# Patient Record
Sex: FEMALE | Race: WHITE | NOT HISPANIC OR LATINO | Employment: OTHER | URBAN - METROPOLITAN AREA
[De-identification: names, ages, dates, MRNs, and addresses within clinical notes are randomized per-mention and may not be internally consistent; named-entity substitution may affect disease eponyms.]

---

## 2023-06-20 ENCOUNTER — NURSING HOME VISIT (OUTPATIENT)
Dept: POST ACUTE CARE | Facility: EXTERNAL LOCATION | Age: 83
End: 2023-06-20
Payer: MEDICARE

## 2023-06-20 DIAGNOSIS — M81.0 AGE RELATED OSTEOPOROSIS, UNSPECIFIED PATHOLOGICAL FRACTURE PRESENCE: ICD-10-CM

## 2023-06-20 DIAGNOSIS — D64.9 ANEMIA, UNSPECIFIED TYPE: ICD-10-CM

## 2023-06-20 DIAGNOSIS — N39.0 URINARY TRACT INFECTION WITHOUT HEMATURIA, SITE UNSPECIFIED: ICD-10-CM

## 2023-06-20 DIAGNOSIS — N81.10 VAGINAL PROLAPSE: ICD-10-CM

## 2023-06-20 DIAGNOSIS — I77.3 FIBROMUSCULAR DYSPLASIA OF CAROTID ARTERY (CMS-HCC): ICD-10-CM

## 2023-06-20 DIAGNOSIS — S72.351D CLOSED DISPLACED COMMINUTED FRACTURE OF SHAFT OF RIGHT FEMUR WITH ROUTINE HEALING: Primary | ICD-10-CM

## 2023-06-20 DIAGNOSIS — E61.1 IRON DEFICIENCY: ICD-10-CM

## 2023-06-20 DIAGNOSIS — I87.2 VENOUS INSUFFICIENCY: ICD-10-CM

## 2023-06-20 DIAGNOSIS — R53.81 PHYSICAL DECONDITIONING: ICD-10-CM

## 2023-06-20 PROCEDURE — 99310 SBSQ NF CARE HIGH MDM 45: CPT | Performed by: NURSE PRACTITIONER

## 2023-06-20 NOTE — LETTER
Patient: Deisi Barkley  : 1940    Encounter Date: 2023    Chief Complaint:   SNF F/U  R femur fracture  Physical deconditioning/fall  UTI    HPI:   82 year-old female presenting to the ER on 23 w/ c/o R hip pain s/p fall. Pt. reported that she was walking in her home w/ her walker when she lost her balance and fell onto her R side. She was unable to ambulate s/p fall. She was able to call her neighbor who came to check on her and called 911. She was brought into the ER for evaluation. Work-up in ER showed UTI and R intertrochanteric femur fracture. She was admitted to the hospital for further evaluation and treatment. Hospital course:    R femur fracture 2/ fall-ortho consult, underwent IM nailing on , pain mgmt, PT/OT eval, recommending SNF at discharge, SQ lovenox for DVT ppx --> ASA 81 mg BID x 30 days at discharge, WBAT, F/U with ortho after discharge  Fibromuscular dysplasia of the vertebral artery-ASA 81 mg daily, F/U with vascular after discharge  UTI-IV rocephin  Vaginal prolapse-s/p reduction by Uro-GYN on , as prolapse was interfering with surgery for fracture, pt. to F/U after discharge with Uro-GYN (Dr. Walter)  Post-op anemia/RUDDY/vaginal bleeding-s/p 3 units PRBCs, IV venofer, CBC monitored     Pt. was HDS and discharged to [facility] this afternoon. Today, patient denies dizziness, HA, SOB, cough, chest pain, dysuria, constipation, or appetite changes. She reports mild pain to R leg at rest, states that pain is worse with movement. Staff report no clinical concerns.     ROS:    As above in HPI. Otherwise, all other systems have been reviewed and are negative for complaint.    Medications reviewed and verified in NH chart.     Patient Active Problem List   Diagnosis   • Primary osteoarthritis involving multiple joints   • Subclinical hypothyroidism   • Venous insufficiency   • Fibromuscular dysplasia of carotid artery (CMS/HCC)   • Osteoporosis   • Closed displaced  comminuted fracture of shaft of right femur with routine healing   • UTI (urinary tract infection)   • Physical deconditioning   • Anemia        Past Medical History:   Diagnosis Date   • Fibromuscular dysplasia of carotid artery (CMS/HCC)    • Heart attack (CMS/HCC)    • OAB (overactive bladder)    • Osteoporosis    • PMB (postmenopausal bleeding)    • Skin cancer of face    • Uterine prolapse        Past Surgical History:   Procedure Laterality Date   • CARDIAC CATHETERIZATION Left    • CT HEAD ANGIO W AND WO IV CONTRAST  2023    CT HEAD ANGIO W AND WO IV CONTRAST GEN CT   • CT NECK ANGIO W AND WO IV CONTRAST  2023    CT NECK ANGIO W AND WO IV CONTRAST GEN CT   • MR HEAD ANGIO W AND WO IV CONTRAST  2023    MR HEAD ANGIO W AND WO IV CONTRAST GEN MRI   • MR NECK ANGIO W AND WO IV CONTRAST  2023    MR NECK ANGIO W AND WO IV CONTRAST GEN MRI       Family History   Problem Relation Name Age of Onset   • Rheum arthritis Sister     • Other (trigeminal neuralgia) Son     • Anxiety disorder Son     • Rheum arthritis Mother's Sister         Social History     Tobacco Use   Smoking Status Former   • Types: Cigarettes   • Quit date:    • Years since quittin.5   Smokeless Tobacco Never       Social History     Substance and Sexual Activity   Alcohol Use Never       Social History     Substance and Sexual Activity   Drug Use Never       No Known Allergies     Vital Signs:   102/-29-98.3-96% on RA     Physical Exam:  General: Sitting up in bed in NAD, alert   Head/Face: NCAT, symmetrical  Eyes: PERRLA, no injection, no discharge  ENT: Hearing not impaired, ears without scars or lesions, nasal mucosa and turbinates pink, septum midline, lips pink and moist  Neck: Supple, symmetrical  Respiratory: CTA without adventitious sounds, even and nonlabored without use of accessory muscles, good air exchange  Cardio: RRR without murmur or gallops, normal S1S2, NP edema to RLE, pedal pulses 3+/4  bilaterally  Chest/Breast: Symmetrical  GI: BS x 4, normoactive, non-distended, abd round and soft, no masses or tenderness  : No suprapubic tenderness or distention  MSK: Gait not assessed, joints with full ROM without pain or contractures  Skin: Skin warm and dry, no induration, DSG to R hip intact with shadowing noted at top of DSG   Neurologic: Cranial nerves II through XII intact, superficial touch and pain sensation intact  Psychiatric: Alert to person, place, and time, calm and cooperative     Results/Data:    Latest Reference Range & Units 06/20/23 06:17   GLUCOSE 74 - 99 mg/dL 82   SODIUM 136 - 145 mmol/L 139   POTASSIUM 3.5 - 5.3 mmol/L 3.9   CHLORIDE 98 - 107 mmol/L 108 (H)   Bicarbonate 21 - 32 mmol/L 25   Anion Gap 10 - 20 mmol/L 10   Blood Urea Nitrogen 6 - 23 mg/dL 10   Creatinine 0.50 - 1.05 mg/dL 0.47 (L)   Calcium 8.6 - 10.3 mg/dL 8.2 (L)   WBC 4.4 - 11.3 x10E9/L 5.7   RBC 4.00 - 5.20 x10E12/L 2.98 (L)   HEMOGLOBIN 12.0 - 16.0 g/dL 9.0 (L)   HEMATOCRIT 36.0 - 46.0 % 28.3 (L)   MCV 80 - 100 fL 95   MCHC 32.0 - 36.0 g/dL 31.8 (L)   Platelets 150 - 450 x10E9/L 172   RED CELL DISTRIBUTION WIDTH 11.5 - 14.5 % 18.1 (H)   GFR Female >90 mL/min/1.73m2 >90   (H): Data is abnormally high  (L): Data is abnormally low    Assessment/Plan:  Right femur fracture 2/2 fall-s/p IM nailing on 6/14, WBAT to RLE, Tylenol ES 1000 mg BID, tramadol prn, c/w lovenox x 30 days for DVT ppx, monitor CBC, F/U with ortho as scheduled  Physical deconditioning/fall-c/w PT/OT, safety and fall precautions   UTI-s/p IV rocephin, denies symptoms  Vaginal prolapse-s/p reduction by Uro-Gyn on 6/14, F/U with Dr. Walter after discharge  Anemia-post-operative/iron-deficiency/vaginal bleeding related, s/p 3 units PRBCs, IV venofer x 1, monitor CBC weekly and prn  Fibromuscular dysplasia of the vertebral artery-c/w ASA 81 mg daily, F/U with vascular after discharge  Osteoporosis-WB exercises as tolerated, c/w calcium + vit D  Venous  insufficiency-JOB hose to BLE, elevate legs, monitor neurovascular status    Orders:  NNO    Code Status:   Full Code    Time spent reviewing patient's chart on the unit (PMH, PSH, FH, Social Hx AND progress and/or consult notes, labs, and radiology results): 25 minutes  Time spent interviewing and/or examining the patient: 10 minutes  Time spent writing note on the unit: 5 minutes  Time spent reviewing POC w/ patient, family, and/or staff: 6 minutes  Total visit time: 46 minutes. Greater than 50% of time was spent on counseling and/or coordination of care of the patient. Start time: 3:31 PM, End time: 4:17 PM.            Electronically Signed By: BUBBA Joshi   6/30/23  9:23 PM

## 2023-06-24 ENCOUNTER — NURSING HOME VISIT (OUTPATIENT)
Dept: POST ACUTE CARE | Facility: EXTERNAL LOCATION | Age: 83
End: 2023-06-24
Payer: MEDICARE

## 2023-06-24 DIAGNOSIS — Z91.81 AT RISK FOR FALLS: ICD-10-CM

## 2023-06-24 DIAGNOSIS — N39.0 URINARY TRACT INFECTION WITHOUT HEMATURIA, SITE UNSPECIFIED: ICD-10-CM

## 2023-06-24 DIAGNOSIS — R53.1 ASTHENIA: ICD-10-CM

## 2023-06-24 DIAGNOSIS — R53.81 PHYSICAL DECONDITIONING: ICD-10-CM

## 2023-06-24 DIAGNOSIS — N81.10 VAGINAL PROLAPSE: ICD-10-CM

## 2023-06-24 DIAGNOSIS — S72.351D CLOSED DISPLACED COMMINUTED FRACTURE OF SHAFT OF RIGHT FEMUR WITH ROUTINE HEALING: Primary | ICD-10-CM

## 2023-06-24 PROCEDURE — 99305 1ST NF CARE MODERATE MDM 35: CPT | Performed by: INTERNAL MEDICINE

## 2023-06-24 NOTE — LETTER
Patient: Deisi Barkley  : 1940    Encounter Date: 2023    NAME OF THE PATIENT: Deisi Barkley     YOB: 1940    PLACE OF SERVICE:  [facility].    This is new/initial history and physical.    HPI: Ms. Deisi Barkley is an 82-year-old female with right recent hip fracture.  She was also found to have vaginal prolapse, which has been reduced.  She was found also to have urinary tract infection.  She is currently on antibiotics.  She requires supportive care.    PAST MEDICAL HISTORY:  As per nursing home list.  Vaginal prolapse, UTI, fall risk, weakness, and right femur fracture.    CURRENT MEDICATIONS:  As per nursing home list.  Reviewed.    ALLERGIES:  As per nursing home list.  No known allergies.    SOCIAL HISTORY:  As per nursing home list.  The patient denies use of alcohol or tobacco.    FAMILY HISTORY:  As per nursing home list.  Positive history of hypertension in this patient's family.    REVIEW OF SYSTEMS:  All 12 systems reviewed and pertaining covered in history and physical, the rest are negative.  The patient denies any fevers, chills or chest pain at this time.    PHYSICAL EXAMINATION  GENERAL: This is a well-developed, elderly female, lying in bed, appearing weak and lethargic.  VITAL SIGNS:  As recorded and reviewed from EMR.  Blood pressure 122/70.  Pulse 80.  Respirations 16.  Temperature 98.2.  EYES:  The patient's sclerae white.  The pupils were equal and round.  ENT:  The patient's external ears were normal and the otoscopic examination was negative.  NECK:  Supple.  There were no palpable masses.  Thyroid was not enlarged and there were no carotid bruits.  RESPIRATORY:  The patient had normal inspirations and expirations.  The breath sounds were equal bilaterally and clear to auscultation.  CARDIOVASCULAR:  The patient had S1 normal, split S2 without obvious rubs, clicks, or murmurs.  GASTROINTESTINAL:  There was no  hepatosplenomegaly.  There were no palpable masses and no inguinal nodes.  LYMPHATIC:  The patient had no axillary, groin, or lymphadenopathy.  MUSCULOSKELETAL:  The patient had normal gait.  The joints appeared to be normal without evidence of deformity.  Grossly the muscles had good range of motion and were without effusion.  EXTREMITIES:   There was no evidence of peripheral edema.  The patient's right hip wound is intact with no sign of infection.  NEUROLOGIC:  The patient had normal cranial nerves.  The reflexes, sensory, and motor examination were grossly within normal limits.  PSYCHIATRIC: The patient had normal judgment and insight.  The patient was oriented to person, place, and time, and had no obvious mood defect including depression, anxiety, and/or agitation.    LAB WORK:  Laboratory studies were reviewed.    ASSESSMENT AND PLAN:  Right hip fracture, on pain control.  Weakness, on PT and OT.  Fall risk, on fall precaution.  Recent UTI, on antibiotic.  Recent vaginal prolapse, which has been reduced, continue to monitor.  Medication list reviewed and discussed with the family.  Hospital chart reviewed.      Electronically Signed By: Lucero Lacy MD   8/2/23  3:18 PM

## 2023-06-30 ENCOUNTER — NURSING HOME VISIT (OUTPATIENT)
Dept: POST ACUTE CARE | Facility: EXTERNAL LOCATION | Age: 83
End: 2023-06-30
Payer: MEDICARE

## 2023-06-30 DIAGNOSIS — I87.2 VENOUS INSUFFICIENCY: ICD-10-CM

## 2023-06-30 DIAGNOSIS — M81.0 AGE RELATED OSTEOPOROSIS, UNSPECIFIED PATHOLOGICAL FRACTURE PRESENCE: ICD-10-CM

## 2023-06-30 DIAGNOSIS — E61.1 IRON DEFICIENCY: ICD-10-CM

## 2023-06-30 DIAGNOSIS — D64.9 ANEMIA, UNSPECIFIED TYPE: ICD-10-CM

## 2023-06-30 DIAGNOSIS — R53.81 PHYSICAL DECONDITIONING: ICD-10-CM

## 2023-06-30 DIAGNOSIS — N39.0 URINARY TRACT INFECTION WITHOUT HEMATURIA, SITE UNSPECIFIED: ICD-10-CM

## 2023-06-30 DIAGNOSIS — S72.351D CLOSED DISPLACED COMMINUTED FRACTURE OF SHAFT OF RIGHT FEMUR WITH ROUTINE HEALING: Primary | ICD-10-CM

## 2023-06-30 DIAGNOSIS — I77.3 FIBROMUSCULAR DYSPLASIA OF CAROTID ARTERY (CMS-HCC): ICD-10-CM

## 2023-06-30 DIAGNOSIS — N81.10 VAGINAL PROLAPSE: ICD-10-CM

## 2023-06-30 PROBLEM — M15.9 PRIMARY OSTEOARTHRITIS INVOLVING MULTIPLE JOINTS: Status: ACTIVE | Noted: 2019-04-15

## 2023-06-30 PROBLEM — I25.10 CORONARY ARTERY DISEASE INVOLVING NATIVE CORONARY ARTERY OF NATIVE HEART WITHOUT ANGINA PECTORIS: Status: RESOLVED | Noted: 2019-04-15 | Resolved: 2023-06-30

## 2023-06-30 PROBLEM — M15.0 PRIMARY OSTEOARTHRITIS INVOLVING MULTIPLE JOINTS: Status: ACTIVE | Noted: 2019-04-15

## 2023-06-30 PROBLEM — E03.8 SUBCLINICAL HYPOTHYROIDISM: Status: ACTIVE | Noted: 2019-04-15

## 2023-06-30 PROCEDURE — 99309 SBSQ NF CARE MODERATE MDM 30: CPT | Performed by: NURSE PRACTITIONER

## 2023-06-30 NOTE — LETTER
Patient: Deisi Barkley  : 1940    Encounter Date: 2023    Chief Complaint:   SNF F/U  R femur fracture  Physical deconditioning/fall  UTI    HPI:   82 year-old female presenting to the ER on 23 w/ c/o R hip pain s/p fall. Pt. reported that she was walking in her home w/ her walker when she lost her balance and fell onto her R side. She was unable to ambulate s/p fall. She was able to call her neighbor who came to check on her and called 911. She was brought into the ER for evaluation. Work-up in ER showed UTI and R intertrochanteric femur fracture. She was admitted to the hospital for further evaluation and treatment. Hospital course:    R femur fracture 2/ fall-ortho consult, underwent IM nailing on , pain mgmt, PT/OT eval, recommending SNF at discharge, SQ lovenox for DVT ppx --> ASA 81 mg BID x 30 days at discharge, WBAT, F/U with ortho after discharge  Fibromuscular dysplasia of the vertebral artery-ASA 81 mg daily, F/U with vascular after discharge  UTI-IV rocephin  Vaginal prolapse-s/p reduction by Uro-GYN on , as prolapse was interfering with surgery for fracture, pt. to F/U after discharge with Uro-GYN (Dr. Walter)  Post-op anemia/RUDDY/vaginal bleeding-s/p 3 units PRBCs, IV venofer, CBC monitored     Pt. was HDS and discharged to [facility] on 23. Today, patient denies dizziness, HA, SOB, cough, chest pain, dysuria, constipation, or appetite changes. She reports improvement in R leg pain. Staff report no clinical concerns.     ROS:    As above in HPI. Otherwise, all other systems have been reviewed and are negative for complaint.    Medications reviewed and verified in NH chart.     Patient Active Problem List   Diagnosis   • Primary osteoarthritis involving multiple joints   • Subclinical hypothyroidism   • Venous insufficiency   • Fibromuscular dysplasia of carotid artery (CMS/HCC)   • Osteoporosis   • Closed displaced comminuted fracture of shaft of right femur with  routine healing   • UTI (urinary tract infection)   • Physical deconditioning   • Anemia        Past Medical History:   Diagnosis Date   • Fibromuscular dysplasia of carotid artery (CMS/HCC)    • Heart attack (CMS/HCC)    • OAB (overactive bladder)    • Osteoporosis    • PMB (postmenopausal bleeding)    • Skin cancer of face    • Uterine prolapse        Past Surgical History:   Procedure Laterality Date   • CARDIAC CATHETERIZATION Left    • CT HEAD ANGIO W AND WO IV CONTRAST  2023    CT HEAD ANGIO W AND WO IV CONTRAST GEN CT   • CT NECK ANGIO W AND WO IV CONTRAST  2023    CT NECK ANGIO W AND WO IV CONTRAST GEN CT   • MR HEAD ANGIO W AND WO IV CONTRAST  2023    MR HEAD ANGIO W AND WO IV CONTRAST GEN MRI   • MR NECK ANGIO W AND WO IV CONTRAST  2023    MR NECK ANGIO W AND WO IV CONTRAST GEN MRI       Family History   Problem Relation Name Age of Onset   • Rheum arthritis Sister     • Other (trigeminal neuralgia) Son     • Anxiety disorder Son     • Rheum arthritis Mother's Sister         Social History     Tobacco Use   Smoking Status Former   • Types: Cigarettes   • Quit date:    • Years since quittin.5   Smokeless Tobacco Never       Social History     Substance and Sexual Activity   Alcohol Use Never       Social History     Substance and Sexual Activity   Drug Use Never       No Known Allergies     Vital Signs:   125/85-85-18-98.6-97% on RA     Physical Exam:  General: Sitting up in WC in NAD, alert   Head/Face: NCAT, symmetrical  Eyes: PERRLA, no injection, no discharge  ENT: Hearing not impaired, ears without scars or lesions, nasal mucosa and turbinates pink, septum midline, lips pink and moist  Neck: Supple, symmetrical  Respiratory: CTA without adventitious sounds, even and nonlabored without use of accessory muscles, good air exchange  Cardio: RRR without murmur or gallops, normal S1S2, NP edema to RLE, pedal pulses 3+/4 bilaterally  Chest/Breast: Symmetrical  GI: BS x 4,  normoactive, non-distended, abd round and soft, no masses or tenderness  : No suprapubic tenderness or distention  MSK: Gait not assessed, joints with full ROM without pain or contractures  Skin: Skin warm and dry, no induration, DSG to R hip intact with shadowing noted at top of DSG   Neurologic: Cranial nerves II through XII intact, superficial touch and pain sensation intact  Psychiatric: Alert to person, place, and time, calm and cooperative     Results/Data:   6/28/23: Na+ 136, Lauri 8.4, Hgb 9.4, Hct 30.4  6/21/23: Na+ 135, Hgb 8.9, Hct 27.3     Assessment/Plan:  Right femur fracture 2/2 fall-s/p IM nailing on 6/14, WBAT to RLE, Tylenol ES 1000 mg BID, tramadol prn, c/w lovenox x 30 days for DVT ppx, monitor CBC, F/U with ortho as scheduled  Physical deconditioning/fall-c/w PT/OT, safety and fall precautions   UTI-s/p IV rocephin, denies symptoms  Vaginal prolapse-s/p reduction by Uro-Gyn on 6/14, F/U with Dr. Walter after discharge  Anemia-post-operative/iron-deficiency/vaginal bleeding related, s/p 3 units PRBCs, IV venofer x 1, monitor CBC weekly and prn  Fibromuscular dysplasia of the vertebral artery-c/w ASA 81 mg daily, F/U with vascular after discharge  Osteoporosis-WB exercises as tolerated, c/w calcium + vit D  Venous insufficiency-JOB hose to BLE, elevate legs, monitor neurovascular status    Orders:  NNO    Code Status:   DNR-CCA      Electronically Signed By: ANIBAL Joshi-CNP   7/9/23 10:40 PM

## 2023-07-01 NOTE — PROGRESS NOTES
Chief Complaint:   SNF F/U  R femur fracture  Physical deconditioning/fall  UTI    HPI:   82 year-old female presenting to the ER on 6/13/23 w/ c/o R hip pain s/p fall. Pt. reported that she was walking in her home w/ her walker when she lost her balance and fell onto her R side. She was unable to ambulate s/p fall. She was able to call her neighbor who came to check on her and called 911. She was brought into the ER for evaluation. Work-up in ER showed UTI and R intertrochanteric femur fracture. She was admitted to the hospital for further evaluation and treatment. Hospital course:    R femur fracture 2/2 fall-ortho consult, underwent IM nailing on 6/14, pain mgmt, PT/OT eval, recommending SNF at discharge, SQ lovenox for DVT ppx --> ASA 81 mg BID x 30 days at discharge, WBAT, F/U with ortho after discharge  Fibromuscular dysplasia of the vertebral artery-ASA 81 mg daily, F/U with vascular after discharge  UTI-IV rocephin  Vaginal prolapse-s/p reduction by Uro-GYN on 6/14, as prolapse was interfering with surgery for fracture, pt. to F/U after discharge with Uro-GYN (Dr. Walter)  Post-op anemia/RUDDY/vaginal bleeding-s/p 3 units PRBCs, IV venofer, CBC monitored     Pt. was HDS and discharged to [facility] this afternoon. Today, patient denies dizziness, HA, SOB, cough, chest pain, dysuria, constipation, or appetite changes. She reports mild pain to R leg at rest, states that pain is worse with movement. Staff report no clinical concerns.     ROS:    As above in HPI. Otherwise, all other systems have been reviewed and are negative for complaint.    Medications reviewed and verified in NH chart.     Patient Active Problem List   Diagnosis    Primary osteoarthritis involving multiple joints    Subclinical hypothyroidism    Venous insufficiency    Fibromuscular dysplasia of carotid artery (CMS/HCC)    Osteoporosis    Closed displaced comminuted fracture of shaft of right femur with routine healing    UTI (urinary  tract infection)    Physical deconditioning    Anemia        Past Medical History:   Diagnosis Date    Fibromuscular dysplasia of carotid artery (CMS/HCC)     Heart attack (CMS/HCC)     OAB (overactive bladder)     Osteoporosis     PMB (postmenopausal bleeding)     Skin cancer of face     Uterine prolapse        Past Surgical History:   Procedure Laterality Date    CARDIAC CATHETERIZATION Left     CT HEAD ANGIO W AND WO IV CONTRAST  2023    CT HEAD ANGIO W AND WO IV CONTRAST GEN CT    CT NECK ANGIO W AND WO IV CONTRAST  2023    CT NECK ANGIO W AND WO IV CONTRAST GEN CT    MR HEAD ANGIO W AND WO IV CONTRAST  2023    MR HEAD ANGIO W AND WO IV CONTRAST GEN MRI    MR NECK ANGIO W AND WO IV CONTRAST  2023    MR NECK ANGIO W AND WO IV CONTRAST GEN MRI       Family History   Problem Relation Name Age of Onset    Rheum arthritis Sister      Other (trigeminal neuralgia) Son      Anxiety disorder Son      Rheum arthritis Mother's Sister         Social History     Tobacco Use   Smoking Status Former    Types: Cigarettes    Quit date:     Years since quittin.5   Smokeless Tobacco Never       Social History     Substance and Sexual Activity   Alcohol Use Never       Social History     Substance and Sexual Activity   Drug Use Never       No Known Allergies     Vital Signs:   102/-41-98.3-96% on RA     Physical Exam:  General: Sitting up in bed in NAD, alert   Head/Face: NCAT, symmetrical  Eyes: PERRLA, no injection, no discharge  ENT: Hearing not impaired, ears without scars or lesions, nasal mucosa and turbinates pink, septum midline, lips pink and moist  Neck: Supple, symmetrical  Respiratory: CTA without adventitious sounds, even and nonlabored without use of accessory muscles, good air exchange  Cardio: RRR without murmur or gallops, normal S1S2, NP edema to RLE, pedal pulses 3+/4 bilaterally  Chest/Breast: Symmetrical  GI: BS x 4, normoactive, non-distended, abd round and soft, no  masses or tenderness  : No suprapubic tenderness or distention  MSK: Gait not assessed, joints with full ROM without pain or contractures  Skin: Skin warm and dry, no induration, DSG to R hip intact with shadowing noted at top of DSG   Neurologic: Cranial nerves II through XII intact, superficial touch and pain sensation intact  Psychiatric: Alert to person, place, and time, calm and cooperative     Results/Data:    Latest Reference Range & Units 06/20/23 06:17   GLUCOSE 74 - 99 mg/dL 82   SODIUM 136 - 145 mmol/L 139   POTASSIUM 3.5 - 5.3 mmol/L 3.9   CHLORIDE 98 - 107 mmol/L 108 (H)   Bicarbonate 21 - 32 mmol/L 25   Anion Gap 10 - 20 mmol/L 10   Blood Urea Nitrogen 6 - 23 mg/dL 10   Creatinine 0.50 - 1.05 mg/dL 0.47 (L)   Calcium 8.6 - 10.3 mg/dL 8.2 (L)   WBC 4.4 - 11.3 x10E9/L 5.7   RBC 4.00 - 5.20 x10E12/L 2.98 (L)   HEMOGLOBIN 12.0 - 16.0 g/dL 9.0 (L)   HEMATOCRIT 36.0 - 46.0 % 28.3 (L)   MCV 80 - 100 fL 95   MCHC 32.0 - 36.0 g/dL 31.8 (L)   Platelets 150 - 450 x10E9/L 172   RED CELL DISTRIBUTION WIDTH 11.5 - 14.5 % 18.1 (H)   GFR Female >90 mL/min/1.73m2 >90   (H): Data is abnormally high  (L): Data is abnormally low    Assessment/Plan:  Right femur fracture 2/2 fall-s/p IM nailing on 6/14, WBAT to RLE, Tylenol ES 1000 mg BID, tramadol prn, c/w lovenox x 30 days for DVT ppx, monitor CBC, F/U with ortho as scheduled  Physical deconditioning/fall-c/w PT/OT, safety and fall precautions   UTI-s/p IV rocephin, denies symptoms  Vaginal prolapse-s/p reduction by Uro-Gyn on 6/14, F/U with Dr. Walter after discharge  Anemia-post-operative/iron-deficiency/vaginal bleeding related, s/p 3 units PRBCs, IV venofer x 1, monitor CBC weekly and prn  Fibromuscular dysplasia of the vertebral artery-c/w ASA 81 mg daily, F/U with vascular after discharge  Osteoporosis-WB exercises as tolerated, c/w calcium + vit D  Venous insufficiency-JOB hose to BLE, elevate legs, monitor neurovascular status    Orders:  NNO    Code Status:    Full Code    Time spent reviewing patient's chart on the unit (PMH, PSH, FH, Social Hx AND progress and/or consult notes, labs, and radiology results): 25 minutes  Time spent interviewing and/or examining the patient: 10 minutes  Time spent writing note on the unit: 5 minutes  Time spent reviewing POC w/ patient, family, and/or staff: 6 minutes  Total visit time: 46 minutes. Greater than 50% of time was spent on counseling and/or coordination of care of the patient. Start time: 3:31 PM, End time: 4:17 PM.

## 2023-07-03 ENCOUNTER — NURSING HOME VISIT (OUTPATIENT)
Dept: POST ACUTE CARE | Facility: EXTERNAL LOCATION | Age: 83
End: 2023-07-03
Payer: MEDICARE

## 2023-07-03 DIAGNOSIS — G47.9 SLEEP DISORDER: ICD-10-CM

## 2023-07-03 DIAGNOSIS — Z91.81 AT RISK FOR FALLS: ICD-10-CM

## 2023-07-03 DIAGNOSIS — F41.9 ANXIETY: ICD-10-CM

## 2023-07-03 DIAGNOSIS — R53.1 ASTHENIA: ICD-10-CM

## 2023-07-03 DIAGNOSIS — N81.10 VAGINAL PROLAPSE: ICD-10-CM

## 2023-07-03 DIAGNOSIS — Z87.81 S/P RIGHT HIP FRACTURE: Primary | ICD-10-CM

## 2023-07-03 DIAGNOSIS — N39.0 URINARY TRACT INFECTION WITHOUT HEMATURIA, SITE UNSPECIFIED: ICD-10-CM

## 2023-07-03 PROCEDURE — 99307 SBSQ NF CARE SF MDM 10: CPT | Performed by: INTERNAL MEDICINE

## 2023-07-03 NOTE — LETTER
Patient: Deisi Barkley  : 1940    Encounter Date: 2023    NAME OF THE PATIENT: Deisi Barkley    YOB: 1940    PLACE OF SERVICE:  [facility]    This is a subsequent visit.    HPI: Ms. Deisi Barkley is an 82-year-old female with history of right femur fracture with vaginal prolapse.  She has undergone surgical repair.  She has been placed into physical and occupational therapy in order to gain strength and function.    PAST MEDICAL HISTORY:  As per nursing home list.  Right hip fracture, vaginal prolapse, weakness, frequent falls, UTI, adjustment disorder and insomnia.    CURRENT MEDICATIONS:  As per nursing home list.  Reviewed.    ALLERGIES:  As per nursing home list.  No known allergies.    SOCIAL HISTORY:  As per nursing home list. The patient denies use of alcohol or tobacco.    FAMILY HISTORY:  As per nursing home list.  Positive history of hypertension in this patient's family.    REVIEW OF SYSTEMS:  All 12 systems reviewed and pertaining covered in history and physical, the rest are negative.  The patient denies any fevers, chills or chest pain at this time.    PHYSICAL EXAMINATION  GENERAL: This is a well-developed, well-nourished female, lying in bed, appearing weak and lethargic.  VITAL SIGNS:  As recorded and reviewed from EMR.  Blood pressure 120/70.  Pulse 82.  Respirations 16.  Temperature 98.3.  EYES:  The patient's sclerae white.  The pupils were equal and round.  ENT:  The patient's external ears were normal and the otoscopic examination was negative.  NECK:  Supple.  There were no palpable masses.  Thyroid was not enlarged and there were no carotid bruits.  RESPIRATORY:  The patient had normal inspirations and expirations.  The breath sounds were equal bilaterally and clear to auscultation.  CARDIOVASCULAR:  The patient had S1 normal, split S2 without obvious rubs, clicks, or murmurs.  GASTROINTESTINAL:  There was no  hepatosplenomegaly.  There were no palpable masses and no inguinal nodes.  GENITOURINARY:  Examination shows no prolapse at this time.  LYMPHATIC:  The patient had no axillary, groin, or lymphadenopathy.  MUSCULOSKELETAL:  The patient had normal gait.  The joints appeared to be normal without evidence of deformity.  Grossly the muscles had good range of motion and were without effusion.  EXTREMITIES:  The patient's right hip wound is intact with no sign of infection.  NEUROLOGIC:  The patient had normal cranial nerves.  The reflexes, sensory, and motor examination were grossly within normal limits.  PSYCHIATRIC: The patient had normal judgment and insight. The patient was oriented to person, place, and time, and had no obvious mood defect including depression, anxiety and/or agitation.    LAB WORK: Laboratory studies were reviewed.    ASSESSMENT AND PLAN:  Right hip fracture, on pain control.  History of vaginal prolapse, continue to monitor.  Weakness, on PT/OT.  Fall risk, on fall precaution.  Recent UTI, continue to monitor.  Anxiety, on medication.  Sleep disorder, on melatonin.  Medication list reviewed and discussed with the family.  Hospital chart reviewed.       Electronically Signed By: Lucero Lacy MD   8/2/23  3:18 PM

## 2023-07-07 ENCOUNTER — NURSING HOME VISIT (OUTPATIENT)
Dept: POST ACUTE CARE | Facility: EXTERNAL LOCATION | Age: 83
End: 2023-07-07
Payer: MEDICARE

## 2023-07-07 DIAGNOSIS — I87.2 VENOUS INSUFFICIENCY: ICD-10-CM

## 2023-07-07 DIAGNOSIS — I77.3 FIBROMUSCULAR DYSPLASIA OF CAROTID ARTERY (CMS-HCC): ICD-10-CM

## 2023-07-07 DIAGNOSIS — N39.0 URINARY TRACT INFECTION WITHOUT HEMATURIA, SITE UNSPECIFIED: ICD-10-CM

## 2023-07-07 DIAGNOSIS — S72.351D CLOSED DISPLACED COMMINUTED FRACTURE OF SHAFT OF RIGHT FEMUR WITH ROUTINE HEALING: Primary | ICD-10-CM

## 2023-07-07 DIAGNOSIS — E61.1 IRON DEFICIENCY: ICD-10-CM

## 2023-07-07 DIAGNOSIS — R53.81 PHYSICAL DECONDITIONING: ICD-10-CM

## 2023-07-07 DIAGNOSIS — N81.10 VAGINAL PROLAPSE: ICD-10-CM

## 2023-07-07 DIAGNOSIS — D64.9 ANEMIA, UNSPECIFIED TYPE: ICD-10-CM

## 2023-07-07 PROCEDURE — 99309 SBSQ NF CARE MODERATE MDM 30: CPT | Performed by: NURSE PRACTITIONER

## 2023-07-07 NOTE — LETTER
Patient: Deisi Barkley  : 1940    Encounter Date: 2023    Chief Complaint:   SNF F/U  R femur fracture  Physical deconditioning/fall  UTI    HPI:   82 year-old female presenting to the ER on 23 w/ c/o R hip pain s/p fall. Pt. reported that she was walking in her home w/ her walker when she lost her balance and fell onto her R side. She was unable to ambulate s/p fall. She was able to call her neighbor who came to check on her and called 911. She was brought into the ER for evaluation. Work-up in ER showed UTI and R intertrochanteric femur fracture. She was admitted to the hospital for further evaluation and treatment. Hospital course:    R femur fracture 2/ fall-ortho consult, underwent IM nailing on , pain mgmt, PT/OT eval, recommending SNF at discharge, SQ lovenox for DVT ppx --> ASA 81 mg BID x 30 days at discharge, WBAT, F/U with ortho after discharge  Fibromuscular dysplasia of the vertebral artery-ASA 81 mg daily, F/U with vascular after discharge  UTI-IV rocephin  Vaginal prolapse-s/p reduction by Uro-GYN on , as prolapse was interfering with surgery for fracture, pt. to F/U after discharge with Uro-GYN (Dr. Walter)  Post-op anemia/RUDDY/vaginal bleeding-s/p 3 units PRBCs, IV venofer, CBC monitored     Pt. was HDS and discharged to [facility] on 23. Today, patient denies dizziness, HA, SOB, cough, chest pain, dysuria, constipation, or appetite changes. She continues to report improvement in R leg pain. She reports improvement in R knee swelling. Staff report no clinical concerns.     ROS:    As above in HPI. Otherwise, all other systems have been reviewed and are negative for complaint.    Medications reviewed and verified in NH chart.     Patient Active Problem List   Diagnosis   • Primary osteoarthritis involving multiple joints   • Subclinical hypothyroidism   • Venous insufficiency   • Fibromuscular dysplasia of carotid artery (CMS/HCC)   • Osteoporosis   • Closed  displaced comminuted fracture of shaft of right femur with routine healing   • UTI (urinary tract infection)   • Physical deconditioning   • Anemia        Past Medical History:   Diagnosis Date   • Fibromuscular dysplasia of carotid artery (CMS/HCC)    • Heart attack (CMS/HCC)    • OAB (overactive bladder)    • Osteoporosis    • PMB (postmenopausal bleeding)    • Skin cancer of face    • Uterine prolapse        Past Surgical History:   Procedure Laterality Date   • CARDIAC CATHETERIZATION Left    • CT HEAD ANGIO W AND WO IV CONTRAST  2023    CT HEAD ANGIO W AND WO IV CONTRAST GEN CT   • CT NECK ANGIO W AND WO IV CONTRAST  2023    CT NECK ANGIO W AND WO IV CONTRAST GEN CT   • MR HEAD ANGIO W AND WO IV CONTRAST  2023    MR HEAD ANGIO W AND WO IV CONTRAST GEN MRI   • MR NECK ANGIO W AND WO IV CONTRAST  2023    MR NECK ANGIO W AND WO IV CONTRAST GEN MRI       Family History   Problem Relation Name Age of Onset   • Rheum arthritis Sister     • Other (trigeminal neuralgia) Son     • Anxiety disorder Son     • Rheum arthritis Mother's Sister         Social History     Tobacco Use   Smoking Status Former   • Types: Cigarettes   • Quit date:    • Years since quittin.5   Smokeless Tobacco Never       Social History     Substance and Sexual Activity   Alcohol Use Never       Social History     Substance and Sexual Activity   Drug Use Never       No Known Allergies     Vital Signs:   123/76-84-18-97.3-98% on RA     Physical Exam:  General: Sitting up in WC in NAD, alert   Head/Face: NCAT, symmetrical  Eyes: PERRLA, no injection, no discharge  ENT: Hearing not impaired, ears without scars or lesions, nasal mucosa and turbinates pink, septum midline, lips pink and moist  Neck: Supple, symmetrical  Respiratory: CTA without adventitious sounds, even and nonlabored without use of accessory muscles, good air exchange  Cardio: RRR without murmur or gallops, normal S1S2, trace pedal edema, pedal  pulses 3+/4 bilaterally  Chest/Breast: Symmetrical  GI: BS x 4, normoactive, non-distended, abd round and soft, no masses or tenderness  : No suprapubic tenderness or distention  MSK: Gait not assessed, joints with full ROM without pain or contractures  Skin: Skin warm and dry, no induration, surgical incision to R hip/R femur well-approximated and ANGEL, no s/s of infection  Neurologic: Cranial nerves II through XII intact, superficial touch and pain sensation intact  Psychiatric: Alert to person, place, and time, calm and cooperative     Results/Data:   7/5/23: WBC 3.79, Hgb 10.1, Hct 31.5  6/28/23: Na+ 136, Lauri 8.4, Hgb 9.4, Hct 30.4  6/21/23: Na+ 135, Hgb 8.9, Hct 27.3     Assessment/Plan:  Right femur fracture 2/2 fall-s/p IM nailing on 6/14, WBAT to RLE, Tylenol ES 1000 mg BID, tramadol prn, c/w lovenox x 30 days for DVT ppx, monitor CBC, F/U with ortho as scheduled  Physical deconditioning/fall-c/w PT/OT, safety and fall precautions   UTI-s/p IV rocephin, denies symptoms  Vaginal prolapse-s/p reduction by Uro-Gyn on 6/14, F/U with Dr. Walter after discharge  Anemia-post-operative/iron-deficiency/vaginal bleeding related, s/p 3 units PRBCs, IV venofer x 1, monitor CBC weekly and prn  Fibromuscular dysplasia of the vertebral artery-c/w ASA 81 mg daily, F/U with vascular after discharge  Osteoporosis-WB exercises as tolerated, c/w calcium + vit D  Venous insufficiency-JOB hose to BLE, elevate legs, monitor neurovascular status    Orders:  NNO    Code Status:   DNR-CCA      Electronically Signed By: ANIBAL Joshi-CNP   7/14/23 12:24 PM

## 2023-07-09 ENCOUNTER — NURSING HOME VISIT (OUTPATIENT)
Dept: POST ACUTE CARE | Facility: EXTERNAL LOCATION | Age: 83
End: 2023-07-09
Payer: MEDICARE

## 2023-07-09 DIAGNOSIS — Z91.81 AT RISK FOR FALLS: ICD-10-CM

## 2023-07-09 DIAGNOSIS — N39.0 URINARY TRACT INFECTION WITHOUT HEMATURIA, SITE UNSPECIFIED: ICD-10-CM

## 2023-07-09 DIAGNOSIS — Z87.81 S/P RIGHT HIP FRACTURE: Primary | ICD-10-CM

## 2023-07-09 DIAGNOSIS — G47.09 OTHER INSOMNIA: ICD-10-CM

## 2023-07-09 DIAGNOSIS — R53.1 ASTHENIA: ICD-10-CM

## 2023-07-09 PROCEDURE — 99307 SBSQ NF CARE SF MDM 10: CPT | Performed by: INTERNAL MEDICINE

## 2023-07-09 NOTE — LETTER
Patient: Deisi Barkley  : 1940    Encounter Date: 2023    NAME OF THE PATIENT: Deisi Barkley    YOB: 1940    PLACE OF SERVICE:  [facility]    This is a subsequent visit.    HPI:  Ms. Deisi Barkley is an 82-year-old female with history of right femur fracture.  She has undergone surgical repair.  She attends physical and occupational therapy in order to gain strength and function.    PAST MEDICAL HISTORY:  As per nursing home list.  Vaginal prolapse, UTI, fall risk, weakness, and right femur fracture.    CURRENT MEDICATIONS:  As per nursing home list.  Reviewed.    ALLERGIES:  As per nursing home list.  No known allergies.    SOCIAL HISTORY:  As per nursing home list.  The patient denies use of alcohol or tobacco.    FAMILY HISTORY:  As per nursing home list.  Positive history of hypertension in this patient's family.    REVIEW OF SYSTEMS:  All 12 systems reviewed and pertaining covered in history and physical, the rest are negative.  The patient denies any fevers, chills or chest pain at this time.    PHYSICAL EXAMINATION  GENERAL:  This is a well-developed, well-nourished female, lying in bed, appearing weak and lethargic.  VITAL SIGNS:  As recorded and reviewed from EMR.  Blood pressure 118/70.  Pulse 74.  Respirations 16.  Temperature 97.9.  EYES:  The patient's sclerae white.  The pupils were equal and round.  ENT:  The patient's external ears were normal and the otoscopic examination was negative.  NECK:  Supple.  There were no palpable masses.  Thyroid was not enlarged and there were no carotid bruits.  RESPIRATORY:  The patient had normal inspirations and expirations.  The breath sounds were equal bilaterally and clear to auscultation.  CARDIOVASCULAR:  The patient had S1 normal, split S2 without obvious rubs, clicks, or murmurs.  GASTROINTESTINAL:  There was no hepatosplenomegaly.  There were no palpable masses and no inguinal  nodes.  LYMPHATIC:  The patient had no axillary, groin, or lymphadenopathy.  MUSCULOSKELETAL:  The patient had normal gait.  The joints appeared to be normal without evidence of deformity.  Grossly the muscles had good range of motion and were without effusion.  EXTREMITIES:  The patient's right hip wound is intact with no sign of infection.  NEUROLOGIC:  The patient had normal cranial nerves.  The reflexes, sensory, and motor examination were grossly within normal limits.  PSYCHIATRIC: The patient had normal judgment and insight.  The patient was oriented to person, place, and time, and had no obvious mood defect including depression, anxiety, and/or agitation.    LAB WORK:  Laboratory studies were reviewed from prior visit.    ASSESSMENT AND PLAN:  Right hip fracture, on pain control.  Recent UTI.  Continue to monitor.  Weakness, on PT/OT.  Fall risk, on fall precautions.  Insomnia, on melatonin.  Medication list reviewed and discussed with the family.  Hospital chart reviewed.      Electronically Signed By: Lucero Lacy MD   10/5/23 11:31 AM

## 2023-07-10 NOTE — PROGRESS NOTES
Chief Complaint:   SNF F/U  R femur fracture  Physical deconditioning/fall  UTI    HPI:   82 year-old female presenting to the ER on 6/13/23 w/ c/o R hip pain s/p fall. Pt. reported that she was walking in her home w/ her walker when she lost her balance and fell onto her R side. She was unable to ambulate s/p fall. She was able to call her neighbor who came to check on her and called 911. She was brought into the ER for evaluation. Work-up in ER showed UTI and R intertrochanteric femur fracture. She was admitted to the hospital for further evaluation and treatment. Hospital course:    R femur fracture 2/2 fall-ortho consult, underwent IM nailing on 6/14, pain mgmt, PT/OT eval, recommending SNF at discharge, SQ lovenox for DVT ppx --> ASA 81 mg BID x 30 days at discharge, WBAT, F/U with ortho after discharge  Fibromuscular dysplasia of the vertebral artery-ASA 81 mg daily, F/U with vascular after discharge  UTI-IV rocephin  Vaginal prolapse-s/p reduction by Uro-GYN on 6/14, as prolapse was interfering with surgery for fracture, pt. to F/U after discharge with Uro-GYN (Dr. Walter)  Post-op anemia/RUDDY/vaginal bleeding-s/p 3 units PRBCs, IV venofer, CBC monitored     Pt. was HDS and discharged to [facility] on 6/20/23. Today, patient denies dizziness, HA, SOB, cough, chest pain, dysuria, constipation, or appetite changes. She reports improvement in R leg pain. Staff report no clinical concerns.     ROS:    As above in HPI. Otherwise, all other systems have been reviewed and are negative for complaint.    Medications reviewed and verified in NH chart.     Patient Active Problem List   Diagnosis    Primary osteoarthritis involving multiple joints    Subclinical hypothyroidism    Venous insufficiency    Fibromuscular dysplasia of carotid artery (CMS/HCC)    Osteoporosis    Closed displaced comminuted fracture of shaft of right femur with routine healing    UTI (urinary tract infection)    Physical deconditioning     Anemia        Past Medical History:   Diagnosis Date    Fibromuscular dysplasia of carotid artery (CMS/HCC)     Heart attack (CMS/HCC)     OAB (overactive bladder)     Osteoporosis     PMB (postmenopausal bleeding)     Skin cancer of face     Uterine prolapse        Past Surgical History:   Procedure Laterality Date    CARDIAC CATHETERIZATION Left     CT HEAD ANGIO W AND WO IV CONTRAST  2023    CT HEAD ANGIO W AND WO IV CONTRAST GEN CT    CT NECK ANGIO W AND WO IV CONTRAST  2023    CT NECK ANGIO W AND WO IV CONTRAST GEN CT    MR HEAD ANGIO W AND WO IV CONTRAST  2023    MR HEAD ANGIO W AND WO IV CONTRAST GEN MRI    MR NECK ANGIO W AND WO IV CONTRAST  2023    MR NECK ANGIO W AND WO IV CONTRAST GEN MRI       Family History   Problem Relation Name Age of Onset    Rheum arthritis Sister      Other (trigeminal neuralgia) Son      Anxiety disorder Son      Rheum arthritis Mother's Sister         Social History     Tobacco Use   Smoking Status Former    Types: Cigarettes    Quit date:     Years since quittin.5   Smokeless Tobacco Never       Social History     Substance and Sexual Activity   Alcohol Use Never       Social History     Substance and Sexual Activity   Drug Use Never       No Known Allergies     Vital Signs:   125/85-85-18-98.6-97% on RA     Physical Exam:  General: Sitting up in WC in NAD, alert   Head/Face: NCAT, symmetrical  Eyes: PERRLA, no injection, no discharge  ENT: Hearing not impaired, ears without scars or lesions, nasal mucosa and turbinates pink, septum midline, lips pink and moist  Neck: Supple, symmetrical  Respiratory: CTA without adventitious sounds, even and nonlabored without use of accessory muscles, good air exchange  Cardio: RRR without murmur or gallops, normal S1S2, NP edema to RLE, pedal pulses 3+/4 bilaterally  Chest/Breast: Symmetrical  GI: BS x 4, normoactive, non-distended, abd round and soft, no masses or tenderness  : No suprapubic tenderness  or distention  MSK: Gait not assessed, joints with full ROM without pain or contractures  Skin: Skin warm and dry, no induration, DSG to R hip intact with shadowing noted at top of DSG   Neurologic: Cranial nerves II through XII intact, superficial touch and pain sensation intact  Psychiatric: Alert to person, place, and time, calm and cooperative     Results/Data:   6/28/23: Na+ 136, Lauri 8.4, Hgb 9.4, Hct 30.4  6/21/23: Na+ 135, Hgb 8.9, Hct 27.3     Assessment/Plan:  Right femur fracture 2/2 fall-s/p IM nailing on 6/14, WBAT to RLE, Tylenol ES 1000 mg BID, tramadol prn, c/w lovenox x 30 days for DVT ppx, monitor CBC, F/U with ortho as scheduled  Physical deconditioning/fall-c/w PT/OT, safety and fall precautions   UTI-s/p IV rocephin, denies symptoms  Vaginal prolapse-s/p reduction by Uro-Gyn on 6/14, F/U with Dr. Walter after discharge  Anemia-post-operative/iron-deficiency/vaginal bleeding related, s/p 3 units PRBCs, IV venofer x 1, monitor CBC weekly and prn  Fibromuscular dysplasia of the vertebral artery-c/w ASA 81 mg daily, F/U with vascular after discharge  Osteoporosis-WB exercises as tolerated, c/w calcium + vit D  Venous insufficiency-JOB hose to BLE, elevate legs, monitor neurovascular status    Orders:  NNO    Code Status:   DNR-CCA

## 2023-07-11 ENCOUNTER — NURSING HOME VISIT (OUTPATIENT)
Dept: POST ACUTE CARE | Facility: EXTERNAL LOCATION | Age: 83
End: 2023-07-11
Payer: MEDICARE

## 2023-07-11 DIAGNOSIS — M81.0 AGE RELATED OSTEOPOROSIS, UNSPECIFIED PATHOLOGICAL FRACTURE PRESENCE: ICD-10-CM

## 2023-07-11 DIAGNOSIS — N81.10 VAGINAL PROLAPSE: ICD-10-CM

## 2023-07-11 DIAGNOSIS — T81.49XA SURGICAL WOUND INFECTION: ICD-10-CM

## 2023-07-11 DIAGNOSIS — I77.3 FIBROMUSCULAR DYSPLASIA OF CAROTID ARTERY (CMS-HCC): ICD-10-CM

## 2023-07-11 DIAGNOSIS — D64.9 ANEMIA, UNSPECIFIED TYPE: ICD-10-CM

## 2023-07-11 DIAGNOSIS — R53.81 PHYSICAL DECONDITIONING: ICD-10-CM

## 2023-07-11 DIAGNOSIS — S72.351D CLOSED DISPLACED COMMINUTED FRACTURE OF SHAFT OF RIGHT FEMUR WITH ROUTINE HEALING: Primary | ICD-10-CM

## 2023-07-11 DIAGNOSIS — I87.2 VENOUS INSUFFICIENCY: ICD-10-CM

## 2023-07-11 DIAGNOSIS — N39.0 URINARY TRACT INFECTION WITHOUT HEMATURIA, SITE UNSPECIFIED: ICD-10-CM

## 2023-07-11 PROCEDURE — 99309 SBSQ NF CARE MODERATE MDM 30: CPT | Performed by: NURSE PRACTITIONER

## 2023-07-11 NOTE — LETTER
Patient: Deisi Barkley  : 1940    Encounter Date: 2023    Chief Complaint:   SNF F/U  R femur fracture  Physical deconditioning/fall  UTI    HPI:   82 year-old female presenting to the ER on 23 w/ c/o R hip pain s/p fall. Pt. reported that she was walking in her home w/ her walker when she lost her balance and fell onto her R side. She was unable to ambulate s/p fall. She was able to call her neighbor who came to check on her and called 911. She was brought into the ER for evaluation. Work-up in ER showed UTI and R intertrochanteric femur fracture. She was admitted to the hospital for further evaluation and treatment. Hospital course:    R femur fracture 2/ fall-ortho consult, underwent IM nailing on , pain mgmt, PT/OT eval, recommending SNF at discharge, SQ lovenox for DVT ppx --> ASA 81 mg BID x 30 days at discharge, WBAT, F/U with ortho after discharge  Fibromuscular dysplasia of the vertebral artery-ASA 81 mg daily, F/U with vascular after discharge  UTI-IV rocephin  Vaginal prolapse-s/p reduction by Uro-GYN on , as prolapse was interfering with surgery for fracture, pt. to F/U after discharge with Uro-GYN (Dr. Walter)  Post-op anemia/RUDDY/vaginal bleeding-s/p 3 units PRBCs, IV venofer, CBC monitored     Pt. was HDS and discharged to [facility] on 23. Today, patient denies dizziness, HA, SOB, cough, chest pain, dysuria, constipation, or appetite changes. Pt. is s/p Augmentin for R hip surgical incision infection. She reports improvement in swelling, pain, and warmth. She continues to report R knee pain, XR showed arthritis. Staff continue to report patient with increased vaginal prolapse. Pt. denies any vaginal bleeding, pain, or burning with urination. No other clinical concerns noted at this time.     ROS:    As above in HPI. Otherwise, all other systems have been reviewed and are negative for complaint.    Medications reviewed and verified in NH chart.     Patient Active  Problem List   Diagnosis   • Primary osteoarthritis involving multiple joints   • Subclinical hypothyroidism   • Venous insufficiency   • Fibromuscular dysplasia of carotid artery (CMS/HCC)   • Osteoporosis   • Closed displaced comminuted fracture of shaft of right femur with routine healing   • UTI (urinary tract infection)   • Physical deconditioning   • Anemia        Past Medical History:   Diagnosis Date   • Fibromuscular dysplasia of carotid artery (CMS/HCC)    • Heart attack (CMS/HCC)    • OAB (overactive bladder)    • Osteoporosis    • PMB (postmenopausal bleeding)    • Skin cancer of face    • Uterine prolapse        Past Surgical History:   Procedure Laterality Date   • CARDIAC CATHETERIZATION Left    • CT HEAD ANGIO W AND WO IV CONTRAST  2023    CT HEAD ANGIO W AND WO IV CONTRAST GEN CT   • CT NECK ANGIO W AND WO IV CONTRAST  2023    CT NECK ANGIO W AND WO IV CONTRAST GEN CT   • MR HEAD ANGIO W AND WO IV CONTRAST  2023    MR HEAD ANGIO W AND WO IV CONTRAST GEN MRI   • MR NECK ANGIO W AND WO IV CONTRAST  2023    MR NECK ANGIO W AND WO IV CONTRAST GEN MRI       Family History   Problem Relation Name Age of Onset   • Rheum arthritis Sister     • Other (trigeminal neuralgia) Son     • Anxiety disorder Son     • Rheum arthritis Mother's Sister         Social History     Tobacco Use   Smoking Status Former   • Types: Cigarettes   • Quit date:    • Years since quittin.5   Smokeless Tobacco Never       Social History     Substance and Sexual Activity   Alcohol Use Never       Social History     Substance and Sexual Activity   Drug Use Never       No Known Allergies     Vital Signs:   126/72-85-16-97.6-98% on RA     Physical Exam:  General: Sitting up in WC in NAD, alert   Head/Face: NCAT, symmetrical  Eyes: PERRLA, no injection, no discharge  ENT: Hearing not impaired, ears without scars or lesions, nasal mucosa and turbinates pink, septum midline, lips pink and moist  Neck:  Supple, symmetrical  Respiratory: CTA without adventitious sounds, even and nonlabored without use of accessory muscles, good air exchange  Cardio: RRR without murmur or gallops, normal S1S2, trace pedal edema, pedal pulses 3+/4 bilaterally  Chest/Breast: Symmetrical  GI: BS x 4, normoactive, non-distended, abd round and soft, no masses or tenderness  : No suprapubic tenderness or distention, + vaginal prolapse  MSK: Gait not assessed, joints with full ROM without pain or contractures  Skin: Skin warm and dry, no induration, surgical incision to R hip/R femur well-approximated and ANGEL, mild swelling noted, non-tender with palpation, no redness noted  Neurologic: Cranial nerves II through XII intact, superficial touch and pain sensation intact  Psychiatric: Alert to person, place, and time, calm and cooperative     Results/Data:   7/5/23: WBC 3.79, Hgb 10.1, Hct 31.5  6/28/23: Na+ 136, Lauri 8.4, Hgb 9.4, Hct 30.4  6/21/23: Na+ 135, Hgb 8.9, Hct 27.3     Assessment/Plan:  Right femur fracture 2/2 fall-s/p IM nailing on 6/14, WBAT to RLE, Tylenol ES 1000 mg BID, tramadol prn, c/w lovenox x 30 days for DVT ppx, monitor CBC, F/U with ortho as scheduled  Physical deconditioning/fall-c/w PT/OT, safety and fall precautions   UTI-s/p IV rocephin, denies symptoms  Vaginal prolapse-s/p reduction by Uro-Gyn on 6/14, F/U with Dr. Walter  Anemia-post-operative/iron-deficiency/vaginal bleeding related, s/p 3 units PRBCs, IV venofer x 1, monitor CBC weekly and prn  Fibromuscular dysplasia of the vertebral artery-c/w ASA 81 mg daily, F/U with vascular after discharge  Osteoporosis-WB exercises as tolerated, c/w calcium + vit D  Venous insufficiency-JOB hose to BLE, elevate legs, monitor neurovascular status  R knee pain-XR showed arthritis, c/w Tylenol ES, start on Aspercreme BID, encourage ROM, F/U with ortho as scheduled  R hip surgical wound infection-c/w local wound are, s/p augmentin, improvement noted    Orders:  Aspercreme  to R knee BID for pain  F/U w/ Dr. Walter for vaginal prolapse    Code Status:   DNR-CCA      Electronically Signed By: BUBBA Joshi   7/19/23  9:59 AM

## 2023-07-14 NOTE — PROGRESS NOTES
Chief Complaint:   SNF F/U  R femur fracture  Physical deconditioning/fall  UTI    HPI:   82 year-old female presenting to the ER on 6/13/23 w/ c/o R hip pain s/p fall. Pt. reported that she was walking in her home w/ her walker when she lost her balance and fell onto her R side. She was unable to ambulate s/p fall. She was able to call her neighbor who came to check on her and called 911. She was brought into the ER for evaluation. Work-up in ER showed UTI and R intertrochanteric femur fracture. She was admitted to the hospital for further evaluation and treatment. Hospital course:    R femur fracture 2/2 fall-ortho consult, underwent IM nailing on 6/14, pain mgmt, PT/OT eval, recommending SNF at discharge, SQ lovenox for DVT ppx --> ASA 81 mg BID x 30 days at discharge, WBAT, F/U with ortho after discharge  Fibromuscular dysplasia of the vertebral artery-ASA 81 mg daily, F/U with vascular after discharge  UTI-IV rocephin  Vaginal prolapse-s/p reduction by Uro-GYN on 6/14, as prolapse was interfering with surgery for fracture, pt. to F/U after discharge with Uro-GYN (Dr. Walter)  Post-op anemia/RUDDY/vaginal bleeding-s/p 3 units PRBCs, IV venofer, CBC monitored     Pt. was HDS and discharged to [facility] on 6/20/23. Today, patient denies dizziness, HA, SOB, cough, chest pain, dysuria, constipation, or appetite changes. She continues to report improvement in R leg pain. She reports improvement in R knee swelling. Staff report no clinical concerns.     ROS:    As above in HPI. Otherwise, all other systems have been reviewed and are negative for complaint.    Medications reviewed and verified in NH chart.     Patient Active Problem List   Diagnosis    Primary osteoarthritis involving multiple joints    Subclinical hypothyroidism    Venous insufficiency    Fibromuscular dysplasia of carotid artery (CMS/HCC)    Osteoporosis    Closed displaced comminuted fracture of shaft of right femur with routine healing    UTI  (urinary tract infection)    Physical deconditioning    Anemia        Past Medical History:   Diagnosis Date    Fibromuscular dysplasia of carotid artery (CMS/HCC)     Heart attack (CMS/HCC)     OAB (overactive bladder)     Osteoporosis     PMB (postmenopausal bleeding)     Skin cancer of face     Uterine prolapse        Past Surgical History:   Procedure Laterality Date    CARDIAC CATHETERIZATION Left     CT HEAD ANGIO W AND WO IV CONTRAST  2023    CT HEAD ANGIO W AND WO IV CONTRAST GEN CT    CT NECK ANGIO W AND WO IV CONTRAST  2023    CT NECK ANGIO W AND WO IV CONTRAST GEN CT    MR HEAD ANGIO W AND WO IV CONTRAST  2023    MR HEAD ANGIO W AND WO IV CONTRAST GEN MRI    MR NECK ANGIO W AND WO IV CONTRAST  2023    MR NECK ANGIO W AND WO IV CONTRAST GEN MRI       Family History   Problem Relation Name Age of Onset    Rheum arthritis Sister      Other (trigeminal neuralgia) Son      Anxiety disorder Son      Rheum arthritis Mother's Sister         Social History     Tobacco Use   Smoking Status Former    Types: Cigarettes    Quit date:     Years since quittin.5   Smokeless Tobacco Never       Social History     Substance and Sexual Activity   Alcohol Use Never       Social History     Substance and Sexual Activity   Drug Use Never       No Known Allergies     Vital Signs:   123/76-84-18-97.3-98% on RA     Physical Exam:  General: Sitting up in WC in NAD, alert   Head/Face: NCAT, symmetrical  Eyes: PERRLA, no injection, no discharge  ENT: Hearing not impaired, ears without scars or lesions, nasal mucosa and turbinates pink, septum midline, lips pink and moist  Neck: Supple, symmetrical  Respiratory: CTA without adventitious sounds, even and nonlabored without use of accessory muscles, good air exchange  Cardio: RRR without murmur or gallops, normal S1S2, trace pedal edema, pedal pulses 3+/4 bilaterally  Chest/Breast: Symmetrical  GI: BS x 4, normoactive, non-distended, abd round and  soft, no masses or tenderness  : No suprapubic tenderness or distention  MSK: Gait not assessed, joints with full ROM without pain or contractures  Skin: Skin warm and dry, no induration, surgical incision to R hip/R femur well-approximated and ANGEL, no s/s of infection  Neurologic: Cranial nerves II through XII intact, superficial touch and pain sensation intact  Psychiatric: Alert to person, place, and time, calm and cooperative     Results/Data:   7/5/23: WBC 3.79, Hgb 10.1, Hct 31.5  6/28/23: Na+ 136, Lauri 8.4, Hgb 9.4, Hct 30.4  6/21/23: Na+ 135, Hgb 8.9, Hct 27.3     Assessment/Plan:  Right femur fracture 2/2 fall-s/p IM nailing on 6/14, WBAT to RLE, Tylenol ES 1000 mg BID, tramadol prn, c/w lovenox x 30 days for DVT ppx, monitor CBC, F/U with ortho as scheduled  Physical deconditioning/fall-c/w PT/OT, safety and fall precautions   UTI-s/p IV rocephin, denies symptoms  Vaginal prolapse-s/p reduction by Uro-Gyn on 6/14, F/U with Dr. Walter after discharge  Anemia-post-operative/iron-deficiency/vaginal bleeding related, s/p 3 units PRBCs, IV venofer x 1, monitor CBC weekly and prn  Fibromuscular dysplasia of the vertebral artery-c/w ASA 81 mg daily, F/U with vascular after discharge  Osteoporosis-WB exercises as tolerated, c/w calcium + vit D  Venous insufficiency-JOB hose to BLE, elevate legs, monitor neurovascular status    Orders:  NNO    Code Status:   DNR-CCA

## 2023-07-16 ENCOUNTER — NURSING HOME VISIT (OUTPATIENT)
Dept: POST ACUTE CARE | Facility: EXTERNAL LOCATION | Age: 83
End: 2023-07-16
Payer: MEDICARE

## 2023-07-16 DIAGNOSIS — F43.20 ADJUSTMENT DISORDER, UNSPECIFIED TYPE: ICD-10-CM

## 2023-07-16 DIAGNOSIS — Z91.81 AT RISK FOR FALLS: ICD-10-CM

## 2023-07-16 DIAGNOSIS — G47.09 OTHER INSOMNIA: ICD-10-CM

## 2023-07-16 DIAGNOSIS — R53.1 ASTHENIA: Primary | ICD-10-CM

## 2023-07-16 DIAGNOSIS — Z87.81 S/P RIGHT HIP FRACTURE: ICD-10-CM

## 2023-07-16 DIAGNOSIS — N39.0 URINARY TRACT INFECTION WITHOUT HEMATURIA, SITE UNSPECIFIED: ICD-10-CM

## 2023-07-16 PROCEDURE — 99307 SBSQ NF CARE SF MDM 10: CPT | Performed by: INTERNAL MEDICINE

## 2023-07-16 NOTE — LETTER
Patient: Deisi Barkley  : 1940    Encounter Date: 2023    NAME OF THE PATIENT: Deisi Barkley     YOB: 1940    PLACE OF SERVICE: [facility]    This is a subsequent visit.    HPI: Ms. Deisi Barkley is an 82-year-old female with history of right femur fracture.  She has undergone surgical repair.  She continues to be a fall risk and requires supportive care.    PAST MEDICAL HISTORY:  As per nursing home list.  Right hip fracture, vaginal prolapse, weakness, frequent falls, UTI, adjustment disorder and insomnia.    CURRENT MEDICATIONS:  As per nursing home list.  Reviewed.    ALLERGIES:  As per nursing home list.  No known allergies.    SOCIAL HISTORY:  As per nursing home list. The patient denies use of alcohol or tobacco.    FAMILY HISTORY:  As per nursing home list.  Positive history of hypertension in this patient's family.    REVIEW OF SYSTEMS:  All 12 systems reviewed and pertaining covered in history and physical, the rest are negative.  The patient denies any fevers, chills or chest pain at this time.    PHYSICAL EXAMINATION  GENERAL: This is a well-developed, well-nourished female, sitting in chair, in no acute distress.  VITAL SIGNS:  As recorded and reviewed from EMR.  Blood pressure 118/72.  Pulse 76.  Respirations 16.  Temperature 97.4.  EYES:  The patient's sclerae white.  The pupils were equal and round.  ENT:  The patient's external ears were normal and the otoscopic examination was negative.  NECK:  Supple.  There were no palpable masses.  Thyroid was not enlarged and there were no carotid bruits.  RESPIRATORY:  The patient had normal inspirations and expirations.  The breath sounds were equal bilaterally and clear to auscultation.  CARDIOVASCULAR:  The patient had S1 normal, split S2 without obvious rubs, clicks, or murmurs.  GASTROINTESTINAL:  There was no hepatosplenomegaly.  There were no palpable masses and no inguinal  nodes.  LYMPHATIC:  The patient had no axillary, groin, or lymphadenopathy.  MUSCULOSKELETAL:  The patient had normal gait.  The joints appeared to be normal without evidence of deformity.  Grossly the muscles had good range of motion and were without effusion.  EXTREMITIES: The patient's right leg wound is intact with no sign of infection.  NEUROLOGIC:  The patient had normal cranial nerves.  The reflexes, sensory, and motor examination were grossly within normal limits.  PSYCHIATRIC: The patient had normal judgment and insight.  The patient was oriented to person, place, and time, and had no obvious mood defect including depression, anxiety, and/or agitation.    LAB WORK: Laboratory studies reviewed from prior visit.    ASSESSMENT AND PLAN:  Right femur fracture, on pain control.  Weakness, on PT/OT.  Fall risk, fall precaution.  Recent UTI, continue to monitor.  Insomnia, on melatonin.  Adjustment disorder, continue to monitor.  Medication list reviewed and discussed with the family.  Hospital chart reviewed.      Electronically Signed By: Lucero Lacy MD   10/5/23 11:31 AM

## 2023-07-18 ENCOUNTER — NURSING HOME VISIT (OUTPATIENT)
Dept: POST ACUTE CARE | Facility: EXTERNAL LOCATION | Age: 83
End: 2023-07-18
Payer: MEDICARE

## 2023-07-18 DIAGNOSIS — N93.9 VAGINAL BLEEDING: ICD-10-CM

## 2023-07-18 DIAGNOSIS — M81.0 AGE RELATED OSTEOPOROSIS, UNSPECIFIED PATHOLOGICAL FRACTURE PRESENCE: ICD-10-CM

## 2023-07-18 DIAGNOSIS — E61.1 IRON DEFICIENCY: ICD-10-CM

## 2023-07-18 DIAGNOSIS — N81.10 VAGINAL PROLAPSE: Primary | ICD-10-CM

## 2023-07-18 DIAGNOSIS — I77.3 FIBROMUSCULAR DYSPLASIA OF CAROTID ARTERY (CMS-HCC): ICD-10-CM

## 2023-07-18 DIAGNOSIS — S72.351D CLOSED DISPLACED COMMINUTED FRACTURE OF SHAFT OF RIGHT FEMUR WITH ROUTINE HEALING: ICD-10-CM

## 2023-07-18 DIAGNOSIS — N39.0 URINARY TRACT INFECTION WITHOUT HEMATURIA, SITE UNSPECIFIED: ICD-10-CM

## 2023-07-18 DIAGNOSIS — T81.49XA SURGICAL WOUND INFECTION: ICD-10-CM

## 2023-07-18 DIAGNOSIS — R53.81 PHYSICAL DECONDITIONING: ICD-10-CM

## 2023-07-18 PROCEDURE — 99309 SBSQ NF CARE MODERATE MDM 30: CPT | Performed by: NURSE PRACTITIONER

## 2023-07-18 NOTE — LETTER
Patient: Deisi Barkley  : 1940    Encounter Date: 2023    Chief Complaint:   SNF F/U  R femur fracture  Physical deconditioning/fall  UTI  Vaginal prolapse     HPI:   82 year-old female presenting to the ER on 23 w/ c/o R hip pain s/p fall. Pt. reported that she was walking in her home w/ her walker when she lost her balance and fell onto her R side. She was unable to ambulate s/p fall. She was able to call her neighbor who came to check on her and called 911. She was brought into the ER for evaluation. Work-up in ER showed UTI and R intertrochanteric femur fracture. She was admitted to the hospital for further evaluation and treatment. Hospital course:    R femur fracture 2/ fall-ortho consult, underwent IM nailing on , pain mgmt, PT/OT eval, recommending SNF at discharge, SQ lovenox for DVT ppx --> ASA 81 mg BID x 30 days at discharge, WBAT, F/U with ortho after discharge  Fibromuscular dysplasia of the vertebral artery-ASA 81 mg daily, F/U with vascular after discharge  UTI-IV rocephin  Vaginal prolapse-s/p reduction by Uro-GYN on , as prolapse was interfering with surgery for fracture, pt. to F/U after discharge with Uro-GYN (Dr. Walter)  Post-op anemia/RUDDY/vaginal bleeding-s/p 3 units PRBCs, IV venofer, CBC monitored     Pt. was HDS and discharged to [facility] on 23. Today, patient denies dizziness, HA, SOB, cough, chest pain, dysuria, constipation, or appetite changes. Pt. is s/p Augmentin for R hip surgical incision infection. She reports improvement in swelling and pain of R hip. She continues to report R knee pain, XR showed arthritis. Staff continue to report patient with increased vaginal prolapse and vaginal bleeding. Pt. denies any pain or burning with urination. Pt. was referred to Dr. Walter for F/U, but the soonest appointment is in October. No other clinical concerns noted at this time.     ROS:    As above in HPI. Otherwise, all other systems have been reviewed  and are negative for complaint.    Medications reviewed and verified in NH chart.     Patient Active Problem List   Diagnosis   • Primary osteoarthritis involving multiple joints   • Subclinical hypothyroidism   • Venous insufficiency   • Fibromuscular dysplasia of carotid artery (CMS/HCC)   • Osteoporosis   • Closed displaced comminuted fracture of shaft of right femur with routine healing   • UTI (urinary tract infection)   • Physical deconditioning   • Anemia        Past Medical History:   Diagnosis Date   • Fibromuscular dysplasia of carotid artery (CMS/HCC)    • Heart attack (CMS/HCC)    • OAB (overactive bladder)    • Osteoporosis    • PMB (postmenopausal bleeding)    • Skin cancer of face    • Uterine prolapse        Past Surgical History:   Procedure Laterality Date   • CARDIAC CATHETERIZATION Left    • CT HEAD ANGIO W AND WO IV CONTRAST  2023    CT HEAD ANGIO W AND WO IV CONTRAST GEN CT   • CT NECK ANGIO W AND WO IV CONTRAST  2023    CT NECK ANGIO W AND WO IV CONTRAST GEN CT   • MR HEAD ANGIO W AND WO IV CONTRAST  2023    MR HEAD ANGIO W AND WO IV CONTRAST GEN MRI   • MR NECK ANGIO W AND WO IV CONTRAST  2023    MR NECK ANGIO W AND WO IV CONTRAST GEN MRI       Family History   Problem Relation Name Age of Onset   • Rheum arthritis Sister     • Other (trigeminal neuralgia) Son     • Anxiety disorder Son     • Rheum arthritis Mother's Sister         Social History     Tobacco Use   Smoking Status Former   • Types: Cigarettes   • Quit date:    • Years since quittin.6   Smokeless Tobacco Never       Social History     Substance and Sexual Activity   Alcohol Use Never       Social History     Substance and Sexual Activity   Drug Use Never       No Known Allergies     Vital Signs:   114/66-76-18-98.5-97% on RA     Physical Exam:  General: Sitting up in WC in NAD, alert   Head/Face: NCAT, symmetrical  Eyes: PERRLA, no injection, no discharge  ENT: Hearing not impaired, ears  without scars or lesions, nasal mucosa and turbinates pink, septum midline, lips pink and moist  Neck: Supple, symmetrical  Respiratory: CTA without adventitious sounds, even and nonlabored without use of accessory muscles, good air exchange  Cardio: RRR without murmur or gallops, normal S1S2, trace pedal edema, pedal pulses 3+/4 bilaterally  Chest/Breast: Symmetrical  GI: BS x 4, normoactive, non-distended, abd round and soft, no masses or tenderness  : No suprapubic tenderness or distention, + vaginal prolapse, + vaginal bleeding   MSK: Gait not assessed, joints with full ROM without pain or contractures  Skin: Skin warm and dry, no induration, surgical incision to R hip/R femur well-approximated and ANGEL, mild swelling noted, non-tender with palpation, no redness noted  Neurologic: Cranial nerves II through XII intact, superficial touch and pain sensation intact  Psychiatric: Alert to person, place, and time, calm and cooperative     Results/Data:   7/12/23: Hgb 10.7, Hct 34.5  7/5/23: WBC 3.79, Hgb 10.1, Hct 31.5  6/28/23: Na+ 136, Lauri 8.4, Hgb 9.4, Hct 30.4  6/21/23: Na+ 135, Hgb 8.9, Hct 27.3     Assessment/Plan:  Right femur fracture 2/2 fall-s/p IM nailing on 6/14, WBAT to RLE, Tylenol ES 1000 mg BID, tramadol prn, completed 30-days of lovenox for DVT ppx, monitor CBC, F/U with ortho as scheduled  Physical deconditioning/fall-c/w PT/OT, safety and fall precautions   UTI-s/p IV rocephin, denies symptoms  Vaginal prolapse-s/p reduction by Uro-Gyn on 6/14, F/U with Dr. Walter (soonest available appointment is in October), prolapse is worsening with increased vaginal bleeding, will send to [facility] ER for evaluation   Anemia-post-operative/iron-deficiency/vaginal bleeding related, s/p 3 units PRBCs, IV venofer x 1, monitor CBC weekly and prn  Fibromuscular dysplasia of the vertebral artery-c/w ASA 81 mg daily, F/U with vascular after discharge  Osteoporosis-WB exercises as tolerated, c/w calcium + vit  D  Venous insufficiency-JOB hose to BLE, elevate legs, monitor neurovascular status  R knee pain-XR showed arthritis, c/w Tylenol ES and Aspercreme BID, encourage ROM, F/U with ortho as scheduled  R hip surgical wound infection-c/w local wound are, s/p augmentin, improvement noted    Orders:  Send to [facility] ER for evaluation of vaginal prolapse     Code Status:   DNR-CCA      Electronically Signed By: ANIBAL Joshi-CNP   7/27/23  9:45 AM

## 2023-07-19 NOTE — PROGRESS NOTES
Chief Complaint:   SNF F/U  R femur fracture  Physical deconditioning/fall  UTI    HPI:   82 year-old female presenting to the ER on 6/13/23 w/ c/o R hip pain s/p fall. Pt. reported that she was walking in her home w/ her walker when she lost her balance and fell onto her R side. She was unable to ambulate s/p fall. She was able to call her neighbor who came to check on her and called 911. She was brought into the ER for evaluation. Work-up in ER showed UTI and R intertrochanteric femur fracture. She was admitted to the hospital for further evaluation and treatment. Hospital course:    R femur fracture 2/2 fall-ortho consult, underwent IM nailing on 6/14, pain mgmt, PT/OT eval, recommending SNF at discharge, SQ lovenox for DVT ppx --> ASA 81 mg BID x 30 days at discharge, WBAT, F/U with ortho after discharge  Fibromuscular dysplasia of the vertebral artery-ASA 81 mg daily, F/U with vascular after discharge  UTI-IV rocephin  Vaginal prolapse-s/p reduction by Uro-GYN on 6/14, as prolapse was interfering with surgery for fracture, pt. to F/U after discharge with Uro-GYN (Dr. Walter)  Post-op anemia/RUDDY/vaginal bleeding-s/p 3 units PRBCs, IV venofer, CBC monitored     Pt. was HDS and discharged to [facility] on 6/20/23. Today, patient denies dizziness, HA, SOB, cough, chest pain, dysuria, constipation, or appetite changes. Pt. is s/p Augmentin for R hip surgical incision infection. She reports improvement in swelling, pain, and warmth. She continues to report R knee pain, XR showed arthritis. Staff continue to report patient with increased vaginal prolapse. Pt. denies any vaginal bleeding, pain, or burning with urination. No other clinical concerns noted at this time.     ROS:    As above in HPI. Otherwise, all other systems have been reviewed and are negative for complaint.    Medications reviewed and verified in NH chart.     Patient Active Problem List   Diagnosis    Primary osteoarthritis involving multiple  joints    Subclinical hypothyroidism    Venous insufficiency    Fibromuscular dysplasia of carotid artery (CMS/HCC)    Osteoporosis    Closed displaced comminuted fracture of shaft of right femur with routine healing    UTI (urinary tract infection)    Physical deconditioning    Anemia        Past Medical History:   Diagnosis Date    Fibromuscular dysplasia of carotid artery (CMS/HCC)     Heart attack (CMS/HCC)     OAB (overactive bladder)     Osteoporosis     PMB (postmenopausal bleeding)     Skin cancer of face     Uterine prolapse        Past Surgical History:   Procedure Laterality Date    CARDIAC CATHETERIZATION Left     CT HEAD ANGIO W AND WO IV CONTRAST  2023    CT HEAD ANGIO W AND WO IV CONTRAST GEN CT    CT NECK ANGIO W AND WO IV CONTRAST  2023    CT NECK ANGIO W AND WO IV CONTRAST GEN CT    MR HEAD ANGIO W AND WO IV CONTRAST  2023    MR HEAD ANGIO W AND WO IV CONTRAST GEN MRI    MR NECK ANGIO W AND WO IV CONTRAST  2023    MR NECK ANGIO W AND WO IV CONTRAST GEN MRI       Family History   Problem Relation Name Age of Onset    Rheum arthritis Sister      Other (trigeminal neuralgia) Son      Anxiety disorder Son      Rheum arthritis Mother's Sister         Social History     Tobacco Use   Smoking Status Former    Types: Cigarettes    Quit date:     Years since quittin.5   Smokeless Tobacco Never       Social History     Substance and Sexual Activity   Alcohol Use Never       Social History     Substance and Sexual Activity   Drug Use Never       No Known Allergies     Vital Signs:   126/72-85-16-97.6-98% on RA     Physical Exam:  General: Sitting up in WC in NAD, alert   Head/Face: NCAT, symmetrical  Eyes: PERRLA, no injection, no discharge  ENT: Hearing not impaired, ears without scars or lesions, nasal mucosa and turbinates pink, septum midline, lips pink and moist  Neck: Supple, symmetrical  Respiratory: CTA without adventitious sounds, even and nonlabored without use  of accessory muscles, good air exchange  Cardio: RRR without murmur or gallops, normal S1S2, trace pedal edema, pedal pulses 3+/4 bilaterally  Chest/Breast: Symmetrical  GI: BS x 4, normoactive, non-distended, abd round and soft, no masses or tenderness  : No suprapubic tenderness or distention, + vaginal prolapse  MSK: Gait not assessed, joints with full ROM without pain or contractures  Skin: Skin warm and dry, no induration, surgical incision to R hip/R femur well-approximated and ANGEL, mild swelling noted, non-tender with palpation, no redness noted  Neurologic: Cranial nerves II through XII intact, superficial touch and pain sensation intact  Psychiatric: Alert to person, place, and time, calm and cooperative     Results/Data:   7/5/23: WBC 3.79, Hgb 10.1, Hct 31.5  6/28/23: Na+ 136, Lauri 8.4, Hgb 9.4, Hct 30.4  6/21/23: Na+ 135, Hgb 8.9, Hct 27.3     Assessment/Plan:  Right femur fracture 2/2 fall-s/p IM nailing on 6/14, WBAT to RLE, Tylenol ES 1000 mg BID, tramadol prn, c/w lovenox x 30 days for DVT ppx, monitor CBC, F/U with ortho as scheduled  Physical deconditioning/fall-c/w PT/OT, safety and fall precautions   UTI-s/p IV rocephin, denies symptoms  Vaginal prolapse-s/p reduction by Uro-Gyn on 6/14, F/U with Dr. Walter  Anemia-post-operative/iron-deficiency/vaginal bleeding related, s/p 3 units PRBCs, IV venofer x 1, monitor CBC weekly and prn  Fibromuscular dysplasia of the vertebral artery-c/w ASA 81 mg daily, F/U with vascular after discharge  Osteoporosis-WB exercises as tolerated, c/w calcium + vit D  Venous insufficiency-JOB hose to BLE, elevate legs, monitor neurovascular status  R knee pain-XR showed arthritis, c/w Tylenol ES, start on Aspercreme BID, encourage ROM, F/U with ortho as scheduled  R hip surgical wound infection-c/w local wound are, s/p augmentin, improvement noted    Orders:  Aspercreme to R knee BID for pain  F/U w/ Dr. Walter for vaginal prolapse    Code Status:   DNR-CCA

## 2023-07-25 ENCOUNTER — NURSING HOME VISIT (OUTPATIENT)
Dept: POST ACUTE CARE | Facility: EXTERNAL LOCATION | Age: 83
End: 2023-07-25
Payer: MEDICARE

## 2023-07-25 DIAGNOSIS — D64.9 ANEMIA, UNSPECIFIED TYPE: ICD-10-CM

## 2023-07-25 DIAGNOSIS — I77.3 FIBROMUSCULAR DYSPLASIA OF CAROTID ARTERY (CMS-HCC): ICD-10-CM

## 2023-07-25 DIAGNOSIS — R53.81 PHYSICAL DECONDITIONING: ICD-10-CM

## 2023-07-25 DIAGNOSIS — S72.351D CLOSED DISPLACED COMMINUTED FRACTURE OF SHAFT OF RIGHT FEMUR WITH ROUTINE HEALING: Primary | ICD-10-CM

## 2023-07-25 DIAGNOSIS — N93.9 VAGINAL BLEEDING: ICD-10-CM

## 2023-07-25 DIAGNOSIS — T81.49XA SURGICAL WOUND INFECTION: ICD-10-CM

## 2023-07-25 DIAGNOSIS — M81.0 AGE RELATED OSTEOPOROSIS, UNSPECIFIED PATHOLOGICAL FRACTURE PRESENCE: ICD-10-CM

## 2023-07-25 DIAGNOSIS — N81.10 VAGINAL PROLAPSE: ICD-10-CM

## 2023-07-25 DIAGNOSIS — I87.2 VENOUS INSUFFICIENCY: ICD-10-CM

## 2023-07-25 PROCEDURE — 99309 SBSQ NF CARE MODERATE MDM 30: CPT | Performed by: NURSE PRACTITIONER

## 2023-07-25 NOTE — LETTER
Patient: Deisi Barkley  : 1940    Encounter Date: 2023    Chief Complaint:   SNF F/U  R femur fracture  Physical deconditioning/fall  UTI  Vaginal prolapse     HPI:   82 year-old female presenting to the ER on 23 w/ c/o R hip pain s/p fall. Pt. reported that she was walking in her home w/ her walker when she lost her balance and fell onto her R side. She was unable to ambulate s/p fall. She was able to call her neighbor who came to check on her and called 911. She was brought into the ER for evaluation. Work-up in ER showed UTI and R intertrochanteric femur fracture. She was admitted to the hospital for further evaluation and treatment. Hospital course:    R femur fracture 2/ fall-ortho consult, underwent IM nailing on , pain mgmt, PT/OT eval, recommending SNF at discharge, SQ lovenox for DVT ppx --> ASA 81 mg BID x 30 days at discharge, WBAT, F/U with ortho after discharge  Fibromuscular dysplasia of the vertebral artery-ASA 81 mg daily, F/U with vascular after discharge  UTI-IV rocephin  Vaginal prolapse-s/p reduction by Uro-GYN on , as prolapse was interfering with surgery for fracture, pt. to F/U after discharge with Uro-GYN (Dr. Walter)  Post-op anemia/RUDDY/vaginal bleeding-s/p 3 units PRBCs, IV venofer, CBC monitored     Pt. was HDS and discharged to [facility] on 23. Today, patient denies dizziness, HA, SOB, cough, chest pain, dysuria, constipation, or appetite changes. Pt. is s/p Augmentin for R hip surgical incision infection. She reports improvement in swelling and pain of R hip. She reports improvement in R knee pain s/p cortisone injection on . She denies any vaginal bleeding related to her vaginal prolapse. Staff report no other clinical concerns at this time.     ROS:    As above in HPI. Otherwise, all other systems have been reviewed and are negative for complaint.    Medications reviewed and verified in NH chart.     Patient Active Problem List   Diagnosis   •  Primary osteoarthritis involving multiple joints   • Subclinical hypothyroidism   • Venous insufficiency   • Fibromuscular dysplasia of carotid artery (CMS/HCC)   • Osteoporosis   • Closed displaced comminuted fracture of shaft of right femur with routine healing   • UTI (urinary tract infection)   • Physical deconditioning   • Anemia   • Asthenia   • At risk for falls   • Sleep disorder   • S/P right hip fracture   • Vaginal prolapse   • Anxiety        Past Medical History:   Diagnosis Date   • Fibromuscular dysplasia of carotid artery (CMS/HCC)    • Heart attack (CMS/HCC)    • OAB (overactive bladder)    • Osteoporosis    • PMB (postmenopausal bleeding)    • Skin cancer of face    • Uterine prolapse        Past Surgical History:   Procedure Laterality Date   • CARDIAC CATHETERIZATION Left    • CT HEAD ANGIO W AND WO IV CONTRAST  2023    CT HEAD ANGIO W AND WO IV CONTRAST GEN CT   • CT NECK ANGIO W AND WO IV CONTRAST  2023    CT NECK ANGIO W AND WO IV CONTRAST GEN CT   • MR HEAD ANGIO W AND WO IV CONTRAST  2023    MR HEAD ANGIO W AND WO IV CONTRAST GEN MRI   • MR NECK ANGIO W AND WO IV CONTRAST  2023    MR NECK ANGIO W AND WO IV CONTRAST GEN MRI       Family History   Problem Relation Name Age of Onset   • Rheum arthritis Sister     • Other (trigeminal neuralgia) Son     • Anxiety disorder Son     • Rheum arthritis Mother's Sister         Social History     Tobacco Use   Smoking Status Former   • Types: Cigarettes   • Quit date:    • Years since quittin.6   Smokeless Tobacco Never       Social History     Substance and Sexual Activity   Alcohol Use Never       Social History     Substance and Sexual Activity   Drug Use Never       No Known Allergies     Vital Signs:   128/74-70-18-97.8-97% on RA     Physical Exam:  General: Sitting up in WC in NAD, alert   Head/Face: NCAT, symmetrical  Eyes: PERRLA, no injection, no discharge  ENT: Hearing not impaired, ears without scars or  lesions, nasal mucosa and turbinates pink, septum midline, lips pink and moist  Neck: Supple, symmetrical  Respiratory: CTA without adventitious sounds, even and nonlabored without use of accessory muscles, good air exchange  Cardio: RRR without murmur or gallops, normal S1S2, trace pedal edema, pedal pulses 3+/4 bilaterally  Chest/Breast: Symmetrical  GI: BS x 4, normoactive, non-distended, abd round and soft, no masses or tenderness  : No suprapubic tenderness or distention, + vaginal prolapse  MSK: Gait not assessed, joints with full ROM without pain or contractures  Skin: Skin warm and dry, no induration, surgical incision to R hip/R femur well-approximated and ANEGL, mild swelling noted, non-tender with palpation, no redness noted  Neurologic: Cranial nerves II through XII intact, superficial touch and pain sensation intact  Psychiatric: Alert to person, place, and time, calm and cooperative     Results/Data:   7/12/23: Hgb 10.7, Hct 34.5  7/5/23: WBC 3.79, Hgb 10.1, Hct 31.5  6/28/23: Na+ 136, Lauri 8.4, Hgb 9.4, Hct 30.4  6/21/23: Na+ 135, Hgb 8.9, Hct 27.3     Assessment/Plan:  Right femur fracture 2/2 fall-s/p IM nailing on 6/14, WBAT to RLE, Tylenol ES 1000 mg BID,  completed 30-days of lovenox for DVT ppx, monitor CBC, F/U with ortho as scheduled  Physical deconditioning/fall-c/w PT/OT, safety and fall precautions   UTI-s/p IV rocephin, denies symptoms  Vaginal prolapse-s/p reduction by Uro-Gyn on 6/14, F/U with Dr. Walter as scheduled   Anemia-post-operative/iron-deficiency/vaginal bleeding related, s/p 3 units PRBCs, IV venofer x 1, monitor CBC  Fibromuscular dysplasia of the vertebral artery-c/w ASA 81 mg daily, F/U with vascular after discharge  Osteoporosis-WB exercises as tolerated, c/w calcium + vit D  Venous insufficiency-JOB hose to BLE, elevate legs, monitor neurovascular status  R knee pain-XR showed arthritis, c/w Tylenol ES and Aspercreme BID, s/p cortisone injection on 7/20 by ortho, continue  to encourage ROM, may use compression sleeve to R knee for therapy, F/U with ortho as scheduled  R hip surgical wound infection-RESOLVED, s/p augmentin    Orders:  NNO    Code Status:   DNR-CCA      Electronically Signed By: BUBBA Joshi   8/9/23 11:05 AM

## 2023-07-27 NOTE — PROGRESS NOTES
Chief Complaint:   SNF F/U  R femur fracture  Physical deconditioning/fall  UTI  Vaginal prolapse     HPI:   82 year-old female presenting to the ER on 6/13/23 w/ c/o R hip pain s/p fall. Pt. reported that she was walking in her home w/ her walker when she lost her balance and fell onto her R side. She was unable to ambulate s/p fall. She was able to call her neighbor who came to check on her and called 911. She was brought into the ER for evaluation. Work-up in ER showed UTI and R intertrochanteric femur fracture. She was admitted to the hospital for further evaluation and treatment. Hospital course:    R femur fracture 2/2 fall-ortho consult, underwent IM nailing on 6/14, pain mgmt, PT/OT eval, recommending SNF at discharge, SQ lovenox for DVT ppx --> ASA 81 mg BID x 30 days at discharge, WBAT, F/U with ortho after discharge  Fibromuscular dysplasia of the vertebral artery-ASA 81 mg daily, F/U with vascular after discharge  UTI-IV rocephin  Vaginal prolapse-s/p reduction by Uro-GYN on 6/14, as prolapse was interfering with surgery for fracture, pt. to F/U after discharge with Uro-GYN (Dr. Walter)  Post-op anemia/RUDDY/vaginal bleeding-s/p 3 units PRBCs, IV venofer, CBC monitored     Pt. was HDS and discharged to [facility] on 6/20/23. Today, patient denies dizziness, HA, SOB, cough, chest pain, dysuria, constipation, or appetite changes. Pt. is s/p Augmentin for R hip surgical incision infection. She reports improvement in swelling and pain of R hip. She continues to report R knee pain, XR showed arthritis. Staff continue to report patient with increased vaginal prolapse and vaginal bleeding. Pt. denies any pain or burning with urination. Pt. was referred to Dr. Walter for F/U, but the soonest appointment is in October. No other clinical concerns noted at this time.     ROS:    As above in HPI. Otherwise, all other systems have been reviewed and are negative for complaint.    Medications reviewed and verified  in NH chart.     Patient Active Problem List   Diagnosis    Primary osteoarthritis involving multiple joints    Subclinical hypothyroidism    Venous insufficiency    Fibromuscular dysplasia of carotid artery (CMS/HCC)    Osteoporosis    Closed displaced comminuted fracture of shaft of right femur with routine healing    UTI (urinary tract infection)    Physical deconditioning    Anemia        Past Medical History:   Diagnosis Date    Fibromuscular dysplasia of carotid artery (CMS/HCC)     Heart attack (CMS/HCC)     OAB (overactive bladder)     Osteoporosis     PMB (postmenopausal bleeding)     Skin cancer of face     Uterine prolapse        Past Surgical History:   Procedure Laterality Date    CARDIAC CATHETERIZATION Left     CT HEAD ANGIO W AND WO IV CONTRAST  2023    CT HEAD ANGIO W AND WO IV CONTRAST GEN CT    CT NECK ANGIO W AND WO IV CONTRAST  2023    CT NECK ANGIO W AND WO IV CONTRAST GEN CT    MR HEAD ANGIO W AND WO IV CONTRAST  2023    MR HEAD ANGIO W AND WO IV CONTRAST GEN MRI    MR NECK ANGIO W AND WO IV CONTRAST  2023    MR NECK ANGIO W AND WO IV CONTRAST GEN MRI       Family History   Problem Relation Name Age of Onset    Rheum arthritis Sister      Other (trigeminal neuralgia) Son      Anxiety disorder Son      Rheum arthritis Mother's Sister         Social History     Tobacco Use   Smoking Status Former    Types: Cigarettes    Quit date:     Years since quittin.6   Smokeless Tobacco Never       Social History     Substance and Sexual Activity   Alcohol Use Never       Social History     Substance and Sexual Activity   Drug Use Never       No Known Allergies     Vital Signs:   114/66-76-18-98.5-97% on RA     Physical Exam:  General: Sitting up in WC in NAD, alert   Head/Face: NCAT, symmetrical  Eyes: PERRLA, no injection, no discharge  ENT: Hearing not impaired, ears without scars or lesions, nasal mucosa and turbinates pink, septum midline, lips pink and  moist  Neck: Supple, symmetrical  Respiratory: CTA without adventitious sounds, even and nonlabored without use of accessory muscles, good air exchange  Cardio: RRR without murmur or gallops, normal S1S2, trace pedal edema, pedal pulses 3+/4 bilaterally  Chest/Breast: Symmetrical  GI: BS x 4, normoactive, non-distended, abd round and soft, no masses or tenderness  : No suprapubic tenderness or distention, + vaginal prolapse, + vaginal bleeding   MSK: Gait not assessed, joints with full ROM without pain or contractures  Skin: Skin warm and dry, no induration, surgical incision to R hip/R femur well-approximated and ANGEL, mild swelling noted, non-tender with palpation, no redness noted  Neurologic: Cranial nerves II through XII intact, superficial touch and pain sensation intact  Psychiatric: Alert to person, place, and time, calm and cooperative     Results/Data:   7/12/23: Hgb 10.7, Hct 34.5  7/5/23: WBC 3.79, Hgb 10.1, Hct 31.5  6/28/23: Na+ 136, Lauri 8.4, Hgb 9.4, Hct 30.4  6/21/23: Na+ 135, Hgb 8.9, Hct 27.3     Assessment/Plan:  Right femur fracture 2/2 fall-s/p IM nailing on 6/14, WBAT to RLE, Tylenol ES 1000 mg BID, tramadol prn, completed 30-days of lovenox for DVT ppx, monitor CBC, F/U with ortho as scheduled  Physical deconditioning/fall-c/w PT/OT, safety and fall precautions   UTI-s/p IV rocephin, denies symptoms  Vaginal prolapse-s/p reduction by Uro-Gyn on 6/14, F/U with Dr. Walter (soonest available appointment is in October), prolapse is worsening with increased vaginal bleeding, will send to [facility] ER for evaluation   Anemia-post-operative/iron-deficiency/vaginal bleeding related, s/p 3 units PRBCs, IV venofer x 1, monitor CBC weekly and prn  Fibromuscular dysplasia of the vertebral artery-c/w ASA 81 mg daily, F/U with vascular after discharge  Osteoporosis-WB exercises as tolerated, c/w calcium + vit D  Venous insufficiency-OJB hose to BLE, elevate legs, monitor neurovascular status  R knee  pain-XR showed arthritis, c/w Tylenol ES and Aspercreme BID, encourage ROM, F/U with ortho as scheduled  R hip surgical wound infection-c/w local wound are, s/p augmentin, improvement noted    Orders:  Send to [facility] ER for evaluation of vaginal prolapse     Code Status:   DNR-CCA

## 2023-07-30 ENCOUNTER — NURSING HOME VISIT (OUTPATIENT)
Dept: POST ACUTE CARE | Facility: EXTERNAL LOCATION | Age: 83
End: 2023-07-30
Payer: MEDICARE

## 2023-07-30 DIAGNOSIS — R53.1 ASTHENIA: ICD-10-CM

## 2023-07-30 DIAGNOSIS — Z87.81 S/P RIGHT HIP FRACTURE: ICD-10-CM

## 2023-07-30 DIAGNOSIS — Z91.81 AT RISK FOR FALLS: ICD-10-CM

## 2023-07-30 DIAGNOSIS — G47.09 OTHER INSOMNIA: ICD-10-CM

## 2023-07-30 DIAGNOSIS — N39.0 URINARY TRACT INFECTION WITHOUT HEMATURIA, SITE UNSPECIFIED: Primary | ICD-10-CM

## 2023-07-30 DIAGNOSIS — F41.9 ANXIETY: ICD-10-CM

## 2023-07-30 PROCEDURE — 99307 SBSQ NF CARE SF MDM 10: CPT | Performed by: INTERNAL MEDICINE

## 2023-07-30 NOTE — LETTER
Noted on surgery sheet.    Patient: Deisi Barkley  : 1940    Encounter Date: 2023    NAME OF THE PATIENT: Deisi Barkley    YOB: 1940    PLACE OF SERVICE:  [facility]    This is a subsequent visit.    HPI:  Ms. Deisi Barkley is an 82-year-old female with recent history of hip fracture.  She continues to be in a fall risk.  She requires supportive care.    PAST MEDICAL HISTORY:  As per nursing home list.  Right hip fracture, vaginal prolapse, weakness, frequent falls, UTI, adjustment disorder and insomnia.    CURRENT MEDICATIONS:  As per nursing home list.  Reviewed.    ALLERGIES:  As per nursing home list.  No known allergies.    SOCIAL HISTORY:  As per nursing home list.  The patient denies use of alcohol or tobacco.    FAMILY HISTORY:  As per nursing home list.  Positive history of hypertension in this patient's family.    REVIEW OF SYSTEMS:  All 12 systems reviewed and pertaining covered in history and physical, the rest are negative.  The patient denies any fevers, chills or chest pain at this time.    PHYSICAL EXAMINATION  GENERAL:  This is a well-developed, elderly female, lying in bed, appearing weak and lethargic.  VITAL SIGNS:  As recorded and reviewed from EMR.  Blood pressure 120/76.  Pulse 74.  Respirations 16.  Temperature 97.9.  EYES:  The patient's sclerae white.  The pupils were equal and round.  ENT:  The patient's external ears were normal and the otoscopic examination was negative.  NECK:  Supple.  There were no palpable masses.  Thyroid was not enlarged and there were no carotid bruits.  RESPIRATORY:  The patient had normal inspirations and expirations.  The breath sounds were equal bilaterally and clear to auscultation.  CARDIOVASCULAR:  The patient had S1 normal, split S2 without obvious rubs, clicks, or murmurs.  GASTROINTESTINAL:  There was no hepatosplenomegaly.  There were no palpable masses and no inguinal nodes.  LYMPHATIC:  The patient had no  axillary, groin, or lymphadenopathy.  MUSCULOSKELETAL:  The patient had normal gait.  The joints appeared to be normal without evidence of deformity.  Grossly the muscles had good range of motion and were without effusion.  EXTREMITIES:  There was no evidence of peripheral edema.  There is tenderness over the patient's right hip.  NEUROLOGIC:  The patient had normal cranial nerves.  The reflexes, sensory, and motor examination were grossly within normal limits.  PSYCHIATRIC: The patient had normal judgment and insight.  The patient was oriented to person, place, and time, and had no obvious mood defect including depression, anxiety, and/or agitation.    LAB WORK:  Laboratory studies were reviewed from prior visit.    ASSESSMENT AND PLAN:  Right hip fracture, on pain control.  Recent UTI.  Continue to monitor.  Weakness, on PT/OT.  Fall risk, on fall precautions.  Anxiety, on medication.  Insomnia, on melatonin.  Medication list reviewed and discussed with the family.  Hospital chart reviewed.      Electronically Signed By: Lucero Lacy MD   10/5/23 11:31 AM

## 2023-07-31 PROBLEM — F41.9 ANXIETY: Status: ACTIVE | Noted: 2023-07-31

## 2023-07-31 PROBLEM — N81.10 VAGINAL PROLAPSE: Status: ACTIVE | Noted: 2023-07-31

## 2023-07-31 PROBLEM — Z87.81 S/P RIGHT HIP FRACTURE: Status: ACTIVE | Noted: 2023-07-31

## 2023-07-31 PROBLEM — G47.9 SLEEP DISORDER: Status: ACTIVE | Noted: 2023-07-31

## 2023-07-31 NOTE — PROGRESS NOTES
NAME OF THE PATIENT: Deisi Barkley    YOB: 1940    PLACE OF SERVICE:  [facility]    This is a subsequent visit.    HPI: Ms. Deisi Barkley is an 82-year-old female with history of right femur fracture with vaginal prolapse.  She has undergone surgical repair.  She has been placed into physical and occupational therapy in order to gain strength and function.    PAST MEDICAL HISTORY:  As per nursing home list.  Right hip fracture, vaginal prolapse, weakness, frequent falls, UTI, adjustment disorder and insomnia.    CURRENT MEDICATIONS:  As per nursing home list.  Reviewed.    ALLERGIES:  As per nursing home list.  No known allergies.    SOCIAL HISTORY:  As per nursing home list. The patient denies use of alcohol or tobacco.    FAMILY HISTORY:  As per nursing home list.  Positive history of hypertension in this patient's family.    REVIEW OF SYSTEMS:  All 12 systems reviewed and pertaining covered in history and physical, the rest are negative.  The patient denies any fevers, chills or chest pain at this time.    PHYSICAL EXAMINATION  GENERAL: This is a well-developed, well-nourished female, lying in bed, appearing weak and lethargic.  VITAL SIGNS:  As recorded and reviewed from EMR.  Blood pressure 120/70.  Pulse 82.  Respirations 16.  Temperature 98.3.  EYES:  The patient's sclerae white.  The pupils were equal and round.  ENT:  The patient's external ears were normal and the otoscopic examination was negative.  NECK:  Supple.  There were no palpable masses.  Thyroid was not enlarged and there were no carotid bruits.  RESPIRATORY:  The patient had normal inspirations and expirations.  The breath sounds were equal bilaterally and clear to auscultation.  CARDIOVASCULAR:  The patient had S1 normal, split S2 without obvious rubs, clicks, or murmurs.  GASTROINTESTINAL:  There was no hepatosplenomegaly.  There were no palpable masses and no inguinal  nodes.  GENITOURINARY:  Examination shows no prolapse at this time.  LYMPHATIC:  The patient had no axillary, groin, or lymphadenopathy.  MUSCULOSKELETAL:  The patient had normal gait.  The joints appeared to be normal without evidence of deformity.  Grossly the muscles had good range of motion and were without effusion.  EXTREMITIES:  The patient's right hip wound is intact with no sign of infection.  NEUROLOGIC:  The patient had normal cranial nerves.  The reflexes, sensory, and motor examination were grossly within normal limits.  PSYCHIATRIC: The patient had normal judgment and insight. The patient was oriented to person, place, and time, and had no obvious mood defect including depression, anxiety and/or agitation.    LAB WORK: Laboratory studies were reviewed.    ASSESSMENT AND PLAN:  Right hip fracture, on pain control.  History of vaginal prolapse, continue to monitor.  Weakness, on PT/OT.  Fall risk, on fall precaution.  Recent UTI, continue to monitor.  Anxiety, on medication.  Sleep disorder, on melatonin.  Medication list reviewed and discussed with the family.  Hospital chart reviewed.

## 2023-07-31 NOTE — PROGRESS NOTES
NAME OF THE PATIENT: Deisi Barkley     YOB: 1940    PLACE OF SERVICE:  [facility] [place].    This is new/initial history and physical.    HPI: Ms. Deisi Barkley is an 82-year-old female with right recent hip fracture.  She was also found to have vaginal prolapse, which has been reduced.  She was found also to have urinary tract infection.  She is currently on antibiotics.  She requires supportive care.    PAST MEDICAL HISTORY:  As per nursing home list.  Vaginal prolapse, UTI, fall risk, weakness, and right femur fracture.    CURRENT MEDICATIONS:  As per nursing home list.  Reviewed.    ALLERGIES:  As per nursing home list.  No known allergies.    SOCIAL HISTORY:  As per nursing home list.  The patient denies use of alcohol or tobacco.    FAMILY HISTORY:  As per nursing home list.  Positive history of hypertension in this patient's family.    REVIEW OF SYSTEMS:  All 12 systems reviewed and pertaining covered in history and physical, the rest are negative.  The patient denies any fevers, chills or chest pain at this time.    PHYSICAL EXAMINATION  GENERAL: This is a well-developed, elderly female, lying in bed, appearing weak and lethargic.  VITAL SIGNS:  As recorded and reviewed from EMR.  Blood pressure 122/70.  Pulse 80.  Respirations 16.  Temperature 98.2.  EYES:  The patient's sclerae white.  The pupils were equal and round.  ENT:  The patient's external ears were normal and the otoscopic examination was negative.  NECK:  Supple.  There were no palpable masses.  Thyroid was not enlarged and there were no carotid bruits.  RESPIRATORY:  The patient had normal inspirations and expirations.  The breath sounds were equal bilaterally and clear to auscultation.  CARDIOVASCULAR:  The patient had S1 normal, split S2 without obvious rubs, clicks, or murmurs.  GASTROINTESTINAL:  There was no hepatosplenomegaly.  There were no palpable masses and no inguinal  nodes.  LYMPHATIC:  The patient had no axillary, groin, or lymphadenopathy.  MUSCULOSKELETAL:  The patient had normal gait.  The joints appeared to be normal without evidence of deformity.  Grossly the muscles had good range of motion and were without effusion.  EXTREMITIES:   There was no evidence of peripheral edema.  The patient's right hip wound is intact with no sign of infection.  NEUROLOGIC:  The patient had normal cranial nerves.  The reflexes, sensory, and motor examination were grossly within normal limits.  PSYCHIATRIC: The patient had normal judgment and insight.  The patient was oriented to person, place, and time, and had no obvious mood defect including depression, anxiety, and/or agitation.    LAB WORK:  Laboratory studies were reviewed.    ASSESSMENT AND PLAN:  Right hip fracture, on pain control.  Weakness, on PT and OT.  Fall risk, on fall precaution.  Recent UTI, on antibiotic.  Recent vaginal prolapse, which has been reduced, continue to monitor.  Medication list reviewed and discussed with the family.  Hospital chart reviewed.

## 2023-08-04 ENCOUNTER — NURSING HOME VISIT (OUTPATIENT)
Dept: POST ACUTE CARE | Facility: EXTERNAL LOCATION | Age: 83
End: 2023-08-04
Payer: MEDICARE

## 2023-08-04 DIAGNOSIS — S72.351D CLOSED DISPLACED COMMINUTED FRACTURE OF SHAFT OF RIGHT FEMUR WITH ROUTINE HEALING: Primary | ICD-10-CM

## 2023-08-04 DIAGNOSIS — M81.0 AGE RELATED OSTEOPOROSIS, UNSPECIFIED PATHOLOGICAL FRACTURE PRESENCE: ICD-10-CM

## 2023-08-04 DIAGNOSIS — T81.49XA SURGICAL WOUND INFECTION: ICD-10-CM

## 2023-08-04 DIAGNOSIS — R53.81 PHYSICAL DECONDITIONING: ICD-10-CM

## 2023-08-04 DIAGNOSIS — I77.3 FIBROMUSCULAR DYSPLASIA OF CAROTID ARTERY (CMS-HCC): ICD-10-CM

## 2023-08-04 DIAGNOSIS — N81.10 VAGINAL PROLAPSE: ICD-10-CM

## 2023-08-04 DIAGNOSIS — D64.9 ANEMIA, UNSPECIFIED TYPE: ICD-10-CM

## 2023-08-04 PROCEDURE — 99309 SBSQ NF CARE MODERATE MDM 30: CPT | Performed by: NURSE PRACTITIONER

## 2023-08-04 NOTE — LETTER
Patient: Deisi Barkley  : 1940    Encounter Date: 2023    Chief Complaint:   SNF F/U  R femur fracture  Physical deconditioning/fall  UTI  Vaginal prolapse     HPI:   82 year-old female presenting to the ER on 23 w/ c/o R hip pain s/p fall. Pt. reported that she was walking in her home w/ her walker when she lost her balance and fell onto her R side. She was unable to ambulate s/p fall. She was able to call her neighbor who came to check on her and called 911. She was brought into the ER for evaluation. Work-up in ER showed UTI and R intertrochanteric femur fracture. She was admitted to the hospital for further evaluation and treatment. Hospital course:    R femur fracture 2/ fall-ortho consult, underwent IM nailing on , pain mgmt, PT/OT eval, recommending SNF at discharge, SQ lovenox for DVT ppx --> ASA 81 mg BID x 30 days at discharge, WBAT, F/U with ortho after discharge  Fibromuscular dysplasia of the vertebral artery-ASA 81 mg daily, F/U with vascular after discharge  UTI-IV rocephin  Vaginal prolapse-s/p reduction by Uro-GYN on , as prolapse was interfering with surgery for fracture, pt. to F/U after discharge with Uro-GYN (Dr. Walter)  Post-op anemia/RUDDY/vaginal bleeding-s/p 3 units PRBCs, IV venofer, CBC monitored     Pt. was HDS and discharged to [facility] on 23. Today, patient denies dizziness, HA, SOB, cough, chest pain, dysuria, constipation, or appetite changes. Pt. is s/p Augmentin for R hip surgical incision infection. She reports resolution of swelling and pain of R hip. She reports improvement in R knee pain s/p cortisone injection on  and with use of compression sleeve during therapy. She denies any vaginal bleeding related to her vaginal prolapse. Staff report no other clinical concerns at this time.     ROS:    As above in HPI. Otherwise, all other systems have been reviewed and are negative for complaint.    Medications reviewed and verified in NH chart.      Patient Active Problem List   Diagnosis   • Primary osteoarthritis involving multiple joints   • Subclinical hypothyroidism   • Venous insufficiency   • Fibromuscular dysplasia of carotid artery (CMS/HCC)   • Osteoporosis   • Closed displaced comminuted fracture of shaft of right femur with routine healing   • UTI (urinary tract infection)   • Physical deconditioning   • Anemia   • Asthenia   • At risk for falls   • Sleep disorder   • S/P right hip fracture   • Vaginal prolapse   • Anxiety        Past Medical History:   Diagnosis Date   • Fibromuscular dysplasia of carotid artery (CMS/HCC)    • Heart attack (CMS/HCC)    • OAB (overactive bladder)    • Osteoporosis    • PMB (postmenopausal bleeding)    • Skin cancer of face    • Uterine prolapse        Past Surgical History:   Procedure Laterality Date   • CARDIAC CATHETERIZATION Left    • CT HEAD ANGIO W AND WO IV CONTRAST  2023    CT HEAD ANGIO W AND WO IV CONTRAST GEN CT   • CT NECK ANGIO W AND WO IV CONTRAST  2023    CT NECK ANGIO W AND WO IV CONTRAST GEN CT   • MR HEAD ANGIO W AND WO IV CONTRAST  2023    MR HEAD ANGIO W AND WO IV CONTRAST GEN MRI   • MR NECK ANGIO W AND WO IV CONTRAST  2023    MR NECK ANGIO W AND WO IV CONTRAST GEN MRI       Family History   Problem Relation Name Age of Onset   • Rheum arthritis Sister     • Other (trigeminal neuralgia) Son     • Anxiety disorder Son     • Rheum arthritis Mother's Sister         Social History     Tobacco Use   Smoking Status Former   • Types: Cigarettes   • Quit date:    • Years since quittin.6   Smokeless Tobacco Never       Social History     Substance and Sexual Activity   Alcohol Use Never       Social History     Substance and Sexual Activity   Drug Use Never       No Known Allergies     Vital Signs:   122/73-77-16-98.0-98% on RA     Physical Exam:  General: Sitting up in WC in NAD, alert   Head/Face: NCAT, symmetrical  Eyes: PERRLA, no injection, no discharge  ENT:  Hearing not impaired, ears without scars or lesions, nasal mucosa and turbinates pink, septum midline, lips pink and moist  Neck: Supple, symmetrical  Respiratory: CTA without adventitious sounds, even and nonlabored without use of accessory muscles, good air exchange  Cardio: RRR without murmur or gallops, normal S1S2, no edema, pedal pulses 3+/4 bilaterally  Chest/Breast: Symmetrical  GI: BS x 4, normoactive, non-distended, abd round and soft, no masses or tenderness  : No suprapubic tenderness or distention, + vaginal prolapse  MSK: Gait not assessed, joints with full ROM without pain or contractures  Skin: Skin warm and dry, no induration, surgical incision to R hip/R femur well-approximated and ANGEL  Neurologic: Cranial nerves II through XII intact, superficial touch and pain sensation intact  Psychiatric: Alert to person, place, and time, calm and cooperative     Results/Data:   7/12/23: Hgb 10.7, Hct 34.5  7/5/23: WBC 3.79, Hgb 10.1, Hct 31.5  6/28/23: Na+ 136, Lauri 8.4, Hgb 9.4, Hct 30.4  6/21/23: Na+ 135, Hgb 8.9, Hct 27.3     Assessment/Plan:  Right femur fracture 2/2 fall-s/p IM nailing on 6/14, WBAT to RLE, Tylenol prn for pain (patient requesting that scheduled Tylenol ES be discontinued at this time), completed 30-days of lovenox for DVT ppx, monitor CBC, F/U with ortho as scheduled  Physical deconditioning/fall-c/w PT/OT, safety and fall precautions   UTI-s/p IV rocephin, denies symptoms  Vaginal prolapse-s/p reduction by Uro-Gyn on 6/14, F/U with Dr. Son as scheduled (8/8)  Anemia-post-operative/iron-deficiency/vaginal bleeding related, s/p 3 units PRBCs, IV venofer x 1, monitor CBC  Fibromuscular dysplasia of the vertebral artery-c/w ASA 81 mg daily, F/U with vascular after discharge  Osteoporosis-WB exercises as tolerated, c/w calcium + vit D  Venous insufficiency-JOB hose to BLE, elevate legs, monitor neurovascular status  R knee pain-XR showed arthritis, c/w Tylenol ES and Aspercreme BID,  s/p cortisone injection on 7/20 by ortho, continue to encourage ROM, may use compression sleeve to R knee, F/U with ortho as scheduled  R hip surgical wound infection-RESOLVED, s/p augmentin    Orders:  D/C Tylenol ES routine     Code Status:   DNR-CCA      Electronically Signed By: BUBBA Joshi   8/21/23 10:01 PM

## 2023-08-06 ENCOUNTER — NURSING HOME VISIT (OUTPATIENT)
Dept: POST ACUTE CARE | Facility: EXTERNAL LOCATION | Age: 83
End: 2023-08-06
Payer: MEDICARE

## 2023-08-06 DIAGNOSIS — F43.29 ADJUSTMENT DISORDER WITH OTHER SYMPTOM: ICD-10-CM

## 2023-08-06 DIAGNOSIS — Z91.81 AT RISK FOR FALLS: ICD-10-CM

## 2023-08-06 DIAGNOSIS — N39.0 URINARY TRACT INFECTION WITHOUT HEMATURIA, SITE UNSPECIFIED: ICD-10-CM

## 2023-08-06 DIAGNOSIS — R53.1 ASTHENIA: ICD-10-CM

## 2023-08-06 DIAGNOSIS — G47.09 OTHER INSOMNIA: Primary | ICD-10-CM

## 2023-08-06 DIAGNOSIS — Z87.81 S/P RIGHT HIP FRACTURE: ICD-10-CM

## 2023-08-06 PROCEDURE — 99307 SBSQ NF CARE SF MDM 10: CPT | Performed by: INTERNAL MEDICINE

## 2023-08-06 NOTE — LETTER
Patient: Deisi Barkley  : 1940    Encounter Date: 2023    NAME OF THE PATIENT: Deisi Barkley    YOB: 1940    PLACE OF SERVICE:  [facility].    This is a subsequent visit.    HPI:  Ms. Deisi Barkley is an 82-year-old female with history of right femur fracture.  She has undergone surgical repair.  She has been attending physical and occupational therapy in order to regain strength and function.    PAST MEDICAL HISTORY:  As per nursing home list.  Right hip fracture, vaginal prolapse, weakness, frequent falls, UTI, adjustment disorder and insomnia.    CURRENT MEDICATIONS:  As per nursing home list.  Reviewed.    ALLERGIES:  As per nursing home list.  No known allergies.    SOCIAL HISTORY:  As per nursing home list. The patient denies use of alcohol or tobacco.    FAMILY HISTORY:  As per nursing home list.  Positive history of hypertension in this patient's family.    REVIEW OF SYSTEMS:  All 12 systems reviewed and pertaining covered in history and physical, the rest are negative.  The patient denies any fevers, chills or chest pain at this time.    PHYSICAL EXAMINATION  GENERAL: This is a well-developed, elderly female, lying in bed, appearing weak and lethargic.  VITAL SIGNS:  As recorded and reviewed from EMR.  Blood pressure 118/76.  Pulse 74.  Respirations 16.  Temperature 97.5.  EYES:  The patient's sclerae white.  The pupils were equal and round.  ENT:  The patient's external ears were normal and the otoscopic examination was negative.  NECK:  Supple.  There were no palpable masses.  Thyroid was not enlarged and there were no carotid bruits.  RESPIRATORY:  The patient had normal inspirations and expirations.  The breath sounds were equal bilaterally and clear to auscultation.  CARDIOVASCULAR:  The patient had S1 normal, split S2 without obvious rubs, clicks, or murmurs.  GASTROINTESTINAL:  There was no hepatosplenomegaly.  There were no  palpable masses and no inguinal nodes.  LYMPHATIC:  The patient had no axillary, groin, or lymphadenopathy.  MUSCULOSKELETAL:  The patient had normal gait.  The joints appeared to be normal without evidence of deformity.  Grossly the muscles had good range of motion and were without effusion.  EXTREMITIES:  The patient's right leg wound is intact with no sign of infection.  NEUROLOGIC:  The patient had normal cranial nerves.  The reflexes, sensory, and motor examination were grossly within normal limits.  PSYCHIATRIC: The patient had normal judgment and insight. The patient was oriented to person, place, and time, and had no obvious mood defect including depression, anxiety, and/or agitation.    LAB WORK: Laboratory studies were reviewed from prior visit.    ASSESSMENT AND PLAN:  Right femur fracture, on pain control.  Weakness, on PT and OT.  Fall risk, on fall precaution.  History of UTI, continue to monitor.  Adjustment disorder, on medication.  Insomnia, on melatonin.  Medication list reviewed and discussed with the family.  Hospital chart reviewed.      Electronically Signed By: Lucero Lacy MD   10/5/23 11:33 AM

## 2023-08-08 ENCOUNTER — NURSING HOME VISIT (OUTPATIENT)
Dept: POST ACUTE CARE | Facility: EXTERNAL LOCATION | Age: 83
End: 2023-08-08
Payer: MEDICARE

## 2023-08-08 ENCOUNTER — HOSPITAL ENCOUNTER (OUTPATIENT)
Dept: DATA CONVERSION | Facility: HOSPITAL | Age: 83
Discharge: HOME | End: 2023-08-08

## 2023-08-08 DIAGNOSIS — T81.49XA SURGICAL WOUND INFECTION: ICD-10-CM

## 2023-08-08 DIAGNOSIS — N93.9 VAGINAL BLEEDING: ICD-10-CM

## 2023-08-08 DIAGNOSIS — N81.10 VAGINAL PROLAPSE: ICD-10-CM

## 2023-08-08 DIAGNOSIS — S72.351D CLOSED DISPLACED COMMINUTED FRACTURE OF SHAFT OF RIGHT FEMUR WITH ROUTINE HEALING: Primary | ICD-10-CM

## 2023-08-08 DIAGNOSIS — M81.0 AGE RELATED OSTEOPOROSIS, UNSPECIFIED PATHOLOGICAL FRACTURE PRESENCE: ICD-10-CM

## 2023-08-08 DIAGNOSIS — R53.81 PHYSICAL DECONDITIONING: ICD-10-CM

## 2023-08-08 DIAGNOSIS — R31.9 HEMATURIA, UNSPECIFIED: ICD-10-CM

## 2023-08-08 DIAGNOSIS — N39.0 URINARY TRACT INFECTION WITHOUT HEMATURIA, SITE UNSPECIFIED: ICD-10-CM

## 2023-08-08 DIAGNOSIS — D64.9 ANEMIA, UNSPECIFIED TYPE: ICD-10-CM

## 2023-08-08 DIAGNOSIS — I87.2 VENOUS INSUFFICIENCY: ICD-10-CM

## 2023-08-08 PROCEDURE — 99309 SBSQ NF CARE MODERATE MDM 30: CPT | Performed by: NURSE PRACTITIONER

## 2023-08-08 NOTE — LETTER
Patient: Deisi Barkley  : 1940    Encounter Date: 2023    Chief Complaint:   SNF F/U  R femur fracture  Physical deconditioning/fall  UTI  Vaginal prolapse     HPI:   82 year-old female presenting to the ER on 23 w/ c/o R hip pain s/p fall. Pt. reported that she was walking in her home w/ her walker when she lost her balance and fell onto her R side. She was unable to ambulate s/p fall. She was able to call her neighbor who came to check on her and called 911. She was brought into the ER for evaluation. Work-up in ER showed UTI and R intertrochanteric femur fracture. She was admitted to the hospital for further evaluation and treatment. Hospital course:    R femur fracture 2/ fall-ortho consult, underwent IM nailing on , pain mgmt, PT/OT eval, recommending SNF at discharge, SQ lovenox for DVT ppx --> ASA 81 mg BID x 30 days at discharge, WBAT, F/U with ortho after discharge  Fibromuscular dysplasia of the vertebral artery-ASA 81 mg daily, F/U with vascular after discharge  UTI-IV rocephin  Vaginal prolapse-s/p reduction by Uro-GYN on , as prolapse was interfering with surgery for fracture, pt. to F/U after discharge with Uro-GYN (Dr. Walter)  Post-op anemia/RUDDY/vaginal bleeding-s/p 3 units PRBCs, IV venofer, CBC monitored     Pt. was HDS and discharged to [facility] on 23. Today, patient denies dizziness, HA, SOB, cough, chest pain, dysuria, constipation, or appetite changes. Pt. is s/p Augmentin for R hip surgical incision infection. She reports resolution of swelling and pain of R hip. She reports improvement in R knee pain s/p cortisone injection on  and with use of compression sleeve during therapy. She denies any vaginal bleeding related to her vaginal prolapse. She is scheduled to F/U with Gyno this afternoon. Staff report no other clinical concerns at this time.     ROS:    As above in HPI. Otherwise, all other systems have been reviewed and are negative for  complaint.    Medications reviewed and verified in NH chart.     Patient Active Problem List   Diagnosis   • Primary osteoarthritis involving multiple joints   • Subclinical hypothyroidism   • Venous insufficiency   • Fibromuscular dysplasia of carotid artery (CMS/HCC)   • Osteoporosis   • Closed displaced comminuted fracture of shaft of right femur with routine healing   • UTI (urinary tract infection)   • Physical deconditioning   • Anemia   • Asthenia   • At risk for falls   • Sleep disorder   • S/P right hip fracture   • Vaginal prolapse   • Anxiety        Past Medical History:   Diagnosis Date   • Fibromuscular dysplasia of carotid artery (CMS/HCC)    • Heart attack (CMS/HCC)    • OAB (overactive bladder)    • Osteoporosis    • PMB (postmenopausal bleeding)    • Skin cancer of face    • Uterine prolapse        Past Surgical History:   Procedure Laterality Date   • CARDIAC CATHETERIZATION Left    • CT HEAD ANGIO W AND WO IV CONTRAST  2023    CT HEAD ANGIO W AND WO IV CONTRAST GEN CT   • CT NECK ANGIO W AND WO IV CONTRAST  2023    CT NECK ANGIO W AND WO IV CONTRAST GEN CT   • MR HEAD ANGIO W AND WO IV CONTRAST  2023    MR HEAD ANGIO W AND WO IV CONTRAST GEN MRI   • MR NECK ANGIO W AND WO IV CONTRAST  2023    MR NECK ANGIO W AND WO IV CONTRAST GEN MRI       Family History   Problem Relation Name Age of Onset   • Rheum arthritis Sister     • Other (trigeminal neuralgia) Son     • Anxiety disorder Son     • Rheum arthritis Mother's Sister         Social History     Tobacco Use   Smoking Status Former   • Types: Cigarettes   • Quit date:    • Years since quittin.6   Smokeless Tobacco Never       Social History     Substance and Sexual Activity   Alcohol Use Never       Social History     Substance and Sexual Activity   Drug Use Never       No Known Allergies     Vital Signs:   124/76-68-18-98.0-98% on RA     Physical Exam:  General: Sitting up in WC in NAD, alert   Head/Face:  NCAT, symmetrical  Eyes: PERRLA, no injection, no discharge  ENT: Hearing not impaired, ears without scars or lesions, nasal mucosa and turbinates pink, septum midline, lips pink and moist  Neck: Supple, symmetrical  Respiratory: CTA without adventitious sounds, even and nonlabored without use of accessory muscles, good air exchange  Cardio: RRR without murmur or gallops, normal S1S2, no edema, pedal pulses 3+/4 bilaterally  Chest/Breast: Symmetrical  GI: BS x 4, normoactive, non-distended, abd round and soft, no masses or tenderness  : No suprapubic tenderness or distention, + vaginal prolapse  MSK: Gait not assessed, joints with full ROM without pain or contractures  Skin: Skin warm and dry, no induration, surgical incision to R hip/R femur well-approximated and ANGEL  Neurologic: Cranial nerves II through XII intact, superficial touch and pain sensation intact  Psychiatric: Alert to person, place, and time, calm and cooperative     Results/Data:   7/12/23: Hgb 10.7, Hct 34.5  7/5/23: WBC 3.79, Hgb 10.1, Hct 31.5  6/28/23: Na+ 136, Lauri 8.4, Hgb 9.4, Hct 30.4  6/21/23: Na+ 135, Hgb 8.9, Hct 27.3     Assessment/Plan:  Right femur fracture 2/2 fall-s/p IM nailing on 6/14, WBAT to RLE, Tylenol prn for pain, completed 30-days of lovenox for DVT ppx, monitor CBC, F/U with ortho as scheduled  Physical deconditioning/fall-c/w PT/OT, safety and fall precautions   UTI-s/p IV rocephin, denies symptoms  Vaginal prolapse-s/p reduction by Uro-Gyn on 6/14, F/U with Dr. Son as scheduled (8/8)  Anemia-post-operative/iron-deficiency/vaginal bleeding related, s/p 3 units PRBCs, IV venofer x 1, monitor CBC  Fibromuscular dysplasia of the vertebral artery-c/w ASA 81 mg daily, F/U with vascular after discharge  Osteoporosis-WB exercises as tolerated, c/w calcium + vit D  Venous insufficiency-JOB hose to BLE, elevate legs, monitor neurovascular status  R knee pain-XR showed arthritis, c/w Tylenol ES and Aspercreme BID, s/p  cortisone injection on 7/20 by ortho, continue to encourage ROM, may use compression sleeve to R knee, F/U with ortho as scheduled  R hip surgical wound infection-RESOLVED, s/p augmentin    Orders:  NNO    Code Status:   DNR-CCA      Electronically Signed By: BUBBA Joshi   8/27/23  8:49 PM

## 2023-08-09 LAB
BACTERIA UR QL AUTO: POSITIVE
BILIRUB UR QL STRIP.AUTO: NEGATIVE
CASTS NOT SPECIF #/AREA UR COMP ASSIST: ABNORMAL /LPF (ref 0–3)
CLARITY UR: ABNORMAL
COLOR UR: YELLOW
GLUCOSE UR STRIP.AUTO-MCNC: NEGATIVE MG/DL
HGB UR QL STRIP.AUTO: 9 /HPF (ref 0–3)
HGB UR QL: ABNORMAL
HYALINE CASTS UR QL AUTO: 15 /LPF
KETONES UR QL STRIP.AUTO: NEGATIVE
LEUKOCYTE ESTERASE UR QL STRIP.AUTO: ABNORMAL
MICROSCOPIC (UA): ABNORMAL
NITRITE UR QL STRIP.AUTO: NEGATIVE
PH UR STRIP.AUTO: 6 [PH] (ref 4.6–8)
PROT UR STRIP.AUTO-MCNC: ABNORMAL MG/DL
SP GR UR STRIP.AUTO: 1.02 (ref 1–1.03)
SQUAMOUS UR QL AUTO: ABNORMAL /HPF
UROBILINOGEN UR QL STRIP.AUTO: NORMAL MG/DL (ref 0–1)
WBC #/AREA URNS AUTO: 456 /HPF (ref 0–3)

## 2023-08-09 NOTE — PROGRESS NOTES
Chief Complaint:   SNF F/U  R femur fracture  Physical deconditioning/fall  UTI  Vaginal prolapse     HPI:   82 year-old female presenting to the ER on 6/13/23 w/ c/o R hip pain s/p fall. Pt. reported that she was walking in her home w/ her walker when she lost her balance and fell onto her R side. She was unable to ambulate s/p fall. She was able to call her neighbor who came to check on her and called 911. She was brought into the ER for evaluation. Work-up in ER showed UTI and R intertrochanteric femur fracture. She was admitted to the hospital for further evaluation and treatment. Hospital course:    R femur fracture 2/2 fall-ortho consult, underwent IM nailing on 6/14, pain mgmt, PT/OT eval, recommending SNF at discharge, SQ lovenox for DVT ppx --> ASA 81 mg BID x 30 days at discharge, WBAT, F/U with ortho after discharge  Fibromuscular dysplasia of the vertebral artery-ASA 81 mg daily, F/U with vascular after discharge  UTI-IV rocephin  Vaginal prolapse-s/p reduction by Uro-GYN on 6/14, as prolapse was interfering with surgery for fracture, pt. to F/U after discharge with Uro-GYN (Dr. Walter)  Post-op anemia/RUDDY/vaginal bleeding-s/p 3 units PRBCs, IV venofer, CBC monitored     Pt. was HDS and discharged to [facility] on 6/20/23. Today, patient denies dizziness, HA, SOB, cough, chest pain, dysuria, constipation, or appetite changes. Pt. is s/p Augmentin for R hip surgical incision infection. She reports improvement in swelling and pain of R hip. She reports improvement in R knee pain s/p cortisone injection on 7/20. She denies any vaginal bleeding related to her vaginal prolapse. Staff report no other clinical concerns at this time.     ROS:    As above in HPI. Otherwise, all other systems have been reviewed and are negative for complaint.    Medications reviewed and verified in NH chart.     Patient Active Problem List   Diagnosis    Primary osteoarthritis involving multiple joints    Subclinical  hypothyroidism    Venous insufficiency    Fibromuscular dysplasia of carotid artery (CMS/HCC)    Osteoporosis    Closed displaced comminuted fracture of shaft of right femur with routine healing    UTI (urinary tract infection)    Physical deconditioning    Anemia    Asthenia    At risk for falls    Sleep disorder    S/P right hip fracture    Vaginal prolapse    Anxiety        Past Medical History:   Diagnosis Date    Fibromuscular dysplasia of carotid artery (CMS/HCC)     Heart attack (CMS/HCC)     OAB (overactive bladder)     Osteoporosis     PMB (postmenopausal bleeding)     Skin cancer of face     Uterine prolapse        Past Surgical History:   Procedure Laterality Date    CARDIAC CATHETERIZATION Left     CT HEAD ANGIO W AND WO IV CONTRAST  2023    CT HEAD ANGIO W AND WO IV CONTRAST GEN CT    CT NECK ANGIO W AND WO IV CONTRAST  2023    CT NECK ANGIO W AND WO IV CONTRAST GEN CT    MR HEAD ANGIO W AND WO IV CONTRAST  2023    MR HEAD ANGIO W AND WO IV CONTRAST GEN MRI    MR NECK ANGIO W AND WO IV CONTRAST  2023    MR NECK ANGIO W AND WO IV CONTRAST GEN MRI       Family History   Problem Relation Name Age of Onset    Rheum arthritis Sister      Other (trigeminal neuralgia) Son      Anxiety disorder Son      Rheum arthritis Mother's Sister         Social History     Tobacco Use   Smoking Status Former    Types: Cigarettes    Quit date:     Years since quittin.6   Smokeless Tobacco Never       Social History     Substance and Sexual Activity   Alcohol Use Never       Social History     Substance and Sexual Activity   Drug Use Never       No Known Allergies     Vital Signs:   128/74-70-18-97.8-97% on RA     Physical Exam:  General: Sitting up in WC in NAD, alert   Head/Face: NCAT, symmetrical  Eyes: PERRLA, no injection, no discharge  ENT: Hearing not impaired, ears without scars or lesions, nasal mucosa and turbinates pink, septum midline, lips pink and moist  Neck: Supple,  symmetrical  Respiratory: CTA without adventitious sounds, even and nonlabored without use of accessory muscles, good air exchange  Cardio: RRR without murmur or gallops, normal S1S2, trace pedal edema, pedal pulses 3+/4 bilaterally  Chest/Breast: Symmetrical  GI: BS x 4, normoactive, non-distended, abd round and soft, no masses or tenderness  : No suprapubic tenderness or distention, + vaginal prolapse  MSK: Gait not assessed, joints with full ROM without pain or contractures  Skin: Skin warm and dry, no induration, surgical incision to R hip/R femur well-approximated and ANGEL, mild swelling noted, non-tender with palpation, no redness noted  Neurologic: Cranial nerves II through XII intact, superficial touch and pain sensation intact  Psychiatric: Alert to person, place, and time, calm and cooperative     Results/Data:   7/12/23: Hgb 10.7, Hct 34.5  7/5/23: WBC 3.79, Hgb 10.1, Hct 31.5  6/28/23: Na+ 136, Lauri 8.4, Hgb 9.4, Hct 30.4  6/21/23: Na+ 135, Hgb 8.9, Hct 27.3     Assessment/Plan:  Right femur fracture 2/2 fall-s/p IM nailing on 6/14, WBAT to RLE, Tylenol ES 1000 mg BID,  completed 30-days of lovenox for DVT ppx, monitor CBC, F/U with ortho as scheduled  Physical deconditioning/fall-c/w PT/OT, safety and fall precautions   UTI-s/p IV rocephin, denies symptoms  Vaginal prolapse-s/p reduction by Uro-Gyn on 6/14, F/U with Dr. Waltre as scheduled   Anemia-post-operative/iron-deficiency/vaginal bleeding related, s/p 3 units PRBCs, IV venofer x 1, monitor CBC  Fibromuscular dysplasia of the vertebral artery-c/w ASA 81 mg daily, F/U with vascular after discharge  Osteoporosis-WB exercises as tolerated, c/w calcium + vit D  Venous insufficiency-JOB hose to BLE, elevate legs, monitor neurovascular status  R knee pain-XR showed arthritis, c/w Tylenol ES and Aspercreme BID, s/p cortisone injection on 7/20 by ortho, continue to encourage ROM, may use compression sleeve to R knee for therapy, F/U with ortho as  scheduled  R hip surgical wound infection-RESOLVED, s/p augmentin    Orders:  NNO    Code Status:   DNR-CCA

## 2023-08-15 ENCOUNTER — NURSING HOME VISIT (OUTPATIENT)
Dept: POST ACUTE CARE | Facility: EXTERNAL LOCATION | Age: 83
End: 2023-08-15
Payer: MEDICARE

## 2023-08-15 DIAGNOSIS — T81.49XA SURGICAL WOUND INFECTION: ICD-10-CM

## 2023-08-15 DIAGNOSIS — M81.0 AGE RELATED OSTEOPOROSIS, UNSPECIFIED PATHOLOGICAL FRACTURE PRESENCE: ICD-10-CM

## 2023-08-15 DIAGNOSIS — I77.3 FIBROMUSCULAR DYSPLASIA OF CAROTID ARTERY (CMS-HCC): ICD-10-CM

## 2023-08-15 DIAGNOSIS — S72.351D CLOSED DISPLACED COMMINUTED FRACTURE OF SHAFT OF RIGHT FEMUR WITH ROUTINE HEALING: Primary | ICD-10-CM

## 2023-08-15 DIAGNOSIS — D64.9 ANEMIA, UNSPECIFIED TYPE: ICD-10-CM

## 2023-08-15 DIAGNOSIS — R53.81 PHYSICAL DECONDITIONING: ICD-10-CM

## 2023-08-15 DIAGNOSIS — N81.10 VAGINAL PROLAPSE: ICD-10-CM

## 2023-08-15 DIAGNOSIS — N39.0 URINARY TRACT INFECTION WITHOUT HEMATURIA, SITE UNSPECIFIED: ICD-10-CM

## 2023-08-15 PROCEDURE — 99309 SBSQ NF CARE MODERATE MDM 30: CPT | Performed by: NURSE PRACTITIONER

## 2023-08-15 NOTE — LETTER
Patient: Deisi Barkley  : 1940    Encounter Date: 08/15/2023    Chief Complaint:   SNF F/U  R femur fracture  Physical deconditioning/fall  UTI  Vaginal prolapse     HPI:   82 year-old female presenting to the ER on 23 w/ c/o R hip pain s/p fall. Pt. reported that she was walking in her home w/ her walker when she lost her balance and fell onto her R side. She was unable to ambulate s/p fall. She was able to call her neighbor who came to check on her and called 911. She was brought into the ER for evaluation. Work-up in ER showed UTI and R intertrochanteric femur fracture. She was admitted to the hospital for further evaluation and treatment. Hospital course:    R femur fracture 2/ fall-ortho consult, underwent IM nailing on , pain mgmt, PT/OT eval, recommending SNF at discharge, SQ lovenox for DVT ppx --> ASA 81 mg BID x 30 days at discharge, WBAT, F/U with ortho after discharge  Fibromuscular dysplasia of the vertebral artery-ASA 81 mg daily, F/U with vascular after discharge  UTI-IV rocephin  Vaginal prolapse-s/p reduction by Uro-GYN on , as prolapse was interfering with surgery for fracture, pt. to F/U after discharge with Uro-GYN (Dr. Walter)  Post-op anemia/RUDDY/vaginal bleeding-s/p 3 units PRBCs, IV venofer, CBC monitored     Pt. was HDS and discharged to [facility] on 23. Today, patient denies dizziness, HA, SOB, cough, chest pain, dysuria, constipation, or appetite changes. Pt. is s/p Augmentin for R hip surgical incision infection. She reports resolution of swelling and pain of R hip. She reports improvement in R knee pain s/p cortisone injection on  and with use of compression sleeve during therapy. She denies any vaginal bleeding related to her vaginal prolapse. She is followed by Gyno. Staff report no other clinical concerns at this time.     ROS:    As above in HPI. Otherwise, all other systems have been reviewed and are negative for complaint.    Medications reviewed  and verified in NH chart.     Patient Active Problem List   Diagnosis   • Primary osteoarthritis involving multiple joints   • Subclinical hypothyroidism   • Venous insufficiency   • Fibromuscular dysplasia of carotid artery (CMS/HCC)   • Osteoporosis   • Closed displaced comminuted fracture of shaft of right femur with routine healing   • UTI (urinary tract infection)   • Physical deconditioning   • Anemia   • Asthenia   • At risk for falls   • Sleep disorder   • S/P right hip fracture   • Vaginal prolapse   • Anxiety        Past Medical History:   Diagnosis Date   • Fibromuscular dysplasia of carotid artery (CMS/HCC)    • Heart attack (CMS/HCC)    • OAB (overactive bladder)    • Osteoporosis    • PMB (postmenopausal bleeding)    • Skin cancer of face    • Uterine prolapse        Past Surgical History:   Procedure Laterality Date   • CARDIAC CATHETERIZATION Left    • CT HEAD ANGIO W AND WO IV CONTRAST  2023    CT HEAD ANGIO W AND WO IV CONTRAST GEN CT   • CT NECK ANGIO W AND WO IV CONTRAST  2023    CT NECK ANGIO W AND WO IV CONTRAST GEN CT   • MR HEAD ANGIO W AND WO IV CONTRAST  2023    MR HEAD ANGIO W AND WO IV CONTRAST GEN MRI   • MR NECK ANGIO W AND WO IV CONTRAST  2023    MR NECK ANGIO W AND WO IV CONTRAST GEN MRI       Family History   Problem Relation Name Age of Onset   • Rheum arthritis Sister     • Other (trigeminal neuralgia) Son     • Anxiety disorder Son     • Rheum arthritis Mother's Sister         Social History     Tobacco Use   Smoking Status Former   • Types: Cigarettes   • Quit date:    • Years since quittin.7   Smokeless Tobacco Never       Social History     Substance and Sexual Activity   Alcohol Use Never       Social History     Substance and Sexual Activity   Drug Use Never       No Known Allergies     Vital Signs:   124/76-68-18-98.0-98% on RA     Physical Exam:  General: Sitting up in WC in NAD, alert   Head/Face: NCAT, symmetrical  Eyes: PERRLA, no  injection, no discharge  ENT: Hearing not impaired, ears without scars or lesions, nasal mucosa and turbinates pink, septum midline, lips pink and moist  Neck: Supple, symmetrical  Respiratory: CTA without adventitious sounds, even and nonlabored without use of accessory muscles, good air exchange  Cardio: RRR without murmur or gallops, normal S1S2, no edema, pedal pulses 3+/4 bilaterally  Chest/Breast: Symmetrical  GI: BS x 4, normoactive, non-distended, abd round and soft, no masses or tenderness  : No suprapubic tenderness or distention, + vaginal prolapse  MSK: Gait not assessed, joints with full ROM without pain or contractures  Skin: Skin warm and dry, no induration, surgical incision to R hip/R femur well-approximated and ANGEL  Neurologic: Cranial nerves II through XII intact, superficial touch and pain sensation intact  Psychiatric: Alert to person, place, and time, calm and cooperative     Results/Data:   7/12/23: Hgb 10.7, Hct 34.5  7/5/23: WBC 3.79, Hgb 10.1, Hct 31.5  6/28/23: Na+ 136, Lauri 8.4, Hgb 9.4, Hct 30.4  6/21/23: Na+ 135, Hgb 8.9, Hct 27.3     Assessment/Plan:  Right femur fracture 2/2 fall-s/p IM nailing on 6/14, WBAT to RLE, Tylenol prn for pain, completed 30-days of lovenox for DVT ppx, monitor CBC, F/U with ortho as scheduled  Physical deconditioning/fall-c/w PT/OT, safety and fall precautions   UTI-s/p IV rocephin, denies symptoms  Vaginal prolapse-s/p reduction by Uro-Gyn on 6/14, F/U with Dr. Son as scheduled   Anemia-post-operative/iron-deficiency/vaginal bleeding related, s/p 3 units PRBCs, IV venofer x 1, monitor CBC  Fibromuscular dysplasia of the vertebral artery-c/w ASA 81 mg daily, F/U with vascular after discharge  Osteoporosis-WB exercises as tolerated, c/w calcium + vit D  Venous insufficiency-JOB hose to BLE, elevate legs, monitor neurovascular status  R knee pain-XR showed arthritis, c/w Tylenol ES prn and Aspercreme prn, s/p cortisone injection on 7/20 by ortho,  continue to encourage ROM, may use compression sleeve to R knee, F/U with ortho as scheduled  R hip surgical wound infection-RESOLVED, s/p augmentin    Orders:  NNO    Code Status:   DNR-CCA      Electronically Signed By: BUBBA Joshi   9/8/23  9:30 PM

## 2023-08-20 ENCOUNTER — NURSING HOME VISIT (OUTPATIENT)
Dept: POST ACUTE CARE | Facility: EXTERNAL LOCATION | Age: 83
End: 2023-08-20
Payer: MEDICARE

## 2023-08-20 DIAGNOSIS — R53.1 ASTHENIA: Primary | ICD-10-CM

## 2023-08-20 DIAGNOSIS — G47.09 OTHER INSOMNIA: ICD-10-CM

## 2023-08-20 DIAGNOSIS — Z87.81 S/P RIGHT HIP FRACTURE: ICD-10-CM

## 2023-08-20 DIAGNOSIS — F43.29 ADJUSTMENT DISORDER WITH OTHER SYMPTOM: ICD-10-CM

## 2023-08-20 DIAGNOSIS — N39.0 URINARY TRACT INFECTION WITHOUT HEMATURIA, SITE UNSPECIFIED: ICD-10-CM

## 2023-08-20 DIAGNOSIS — Z91.81 AT RISK FOR FALLS: ICD-10-CM

## 2023-08-20 PROCEDURE — 99307 SBSQ NF CARE SF MDM 10: CPT | Performed by: INTERNAL MEDICINE

## 2023-08-20 NOTE — LETTER
Patient: Deisi Barkley  : 1940    Encounter Date: 2023    NAME OF THE PATIENT: eDisi Barkley    YOB: 1940    PLACE OF SERVICE:  [facility]    This is a subsequent visit.    HPI:  Ms. Deisi Barkley is an 83-year-old female with history of weakness and deconditioning following a right femur fracture.  She has made slow improvement and requires supportive care.    PAST MEDICAL HISTORY:  As per nursing home list.  Right hip fracture, vaginal prolapse, weakness, frequent falls, UTI, adjustment disorder and insomnia.    CURRENT MEDICATIONS:  As per nursing home list.  Reviewed.    ALLERGIES:  As per nursing home list.  No known allergies.    SOCIAL HISTORY:  As per nursing home list. The patient denies use of alcohol or tobacco.    FAMILY HISTORY:  As per nursing home list.  Positive history of hypertension in this patient's family.    REVIEW OF SYSTEMS:  All 12 systems reviewed and pertaining covered in history and physical, the rest are negative.  The patient denies any fevers, chills or chest pain at this time.    PHYSICAL EXAMINATION  GENERAL:  This is a well-developed, well-nourished female, lying in bed, appearing weak and lethargic.  VITAL SIGNS:  As recorded and reviewed from EMR.  Blood pressure 120/72.  Pulse 76.  Respirations 16.  Temperature 97.7.  EYES:  The patient's sclerae white.  The pupils were equal and round.  ENT:  The patient's external ears were normal and the otoscopic examination was negative.  NECK:  Supple.  There were no palpable masses.  Thyroid was not enlarged and there were no carotid bruits.  RESPIRATORY:  The patient had normal inspirations and expirations.  The breath sounds were equal bilaterally and clear to auscultation.  CARDIOVASCULAR:  The patient had S1 normal, split S2 without obvious rubs, clicks, or murmurs.  GASTROINTESTINAL:  There was no hepatosplenomegaly.  There were no palpable masses and no inguinal  nodes.  LYMPHATIC:  The patient had no axillary, groin, or lymphadenopathy.  MUSCULOSKELETAL:  The patient had normal gait.  The joints appeared to be normal without evidence of deformity.  Grossly the muscles had good range of motion and were without effusion.  EXTREMITIES:  The patient's right leg wound is intact with no sign of infection.  NEUROLOGIC:  The patient had normal cranial nerves.  The reflexes, sensory, and motor examination were grossly within normal limits.  PSYCHIATRIC: The patient had normal judgment and insight.  The patient was oriented to person, place, and time, and had no obvious mood defect including depression, anxiety, and/or agitation.    LAB WORK: Laboratory studies were reviewed from prior visit.    ASSESSMENT AND PLAN:  Weakness, on PT/OT.  Fall risk, on fall precautions.  History of UTI, continue to monitor.  Femur fracture, on pain control.  Adjustment disorder, on medication.  Insomnia, on melatonin.  Medication list reviewed and discussed with the family.  Hospital chart reviewed.      Electronically Signed By: Lucero Lacy MD   10/5/23 11:33 AM

## 2023-08-22 ENCOUNTER — NURSING HOME VISIT (OUTPATIENT)
Dept: POST ACUTE CARE | Facility: EXTERNAL LOCATION | Age: 83
End: 2023-08-22
Payer: MEDICARE

## 2023-08-22 DIAGNOSIS — N93.9 VAGINAL BLEEDING: ICD-10-CM

## 2023-08-22 DIAGNOSIS — R53.81 PHYSICAL DECONDITIONING: ICD-10-CM

## 2023-08-22 DIAGNOSIS — N39.0 URINARY TRACT INFECTION WITHOUT HEMATURIA, SITE UNSPECIFIED: ICD-10-CM

## 2023-08-22 DIAGNOSIS — M81.0 AGE RELATED OSTEOPOROSIS, UNSPECIFIED PATHOLOGICAL FRACTURE PRESENCE: ICD-10-CM

## 2023-08-22 DIAGNOSIS — I77.3 FIBROMUSCULAR DYSPLASIA OF CAROTID ARTERY (CMS-HCC): ICD-10-CM

## 2023-08-22 DIAGNOSIS — N81.10 VAGINAL PROLAPSE: ICD-10-CM

## 2023-08-22 DIAGNOSIS — T81.49XA SURGICAL WOUND INFECTION: ICD-10-CM

## 2023-08-22 DIAGNOSIS — D64.9 ANEMIA, UNSPECIFIED TYPE: ICD-10-CM

## 2023-08-22 DIAGNOSIS — S72.351D CLOSED DISPLACED COMMINUTED FRACTURE OF SHAFT OF RIGHT FEMUR WITH ROUTINE HEALING: Primary | ICD-10-CM

## 2023-08-22 PROCEDURE — 99309 SBSQ NF CARE MODERATE MDM 30: CPT | Performed by: NURSE PRACTITIONER

## 2023-08-22 NOTE — LETTER
Patient: Deisi Barkley  : 1940    Encounter Date: 2023    Chief Complaint:   SNF F/U  R femur fracture  Physical deconditioning/fall  UTI  Vaginal prolapse     HPI:   82 year-old female presenting to the ER on 23 w/ c/o R hip pain s/p fall. Pt. reported that she was walking in her home w/ her walker when she lost her balance and fell onto her R side. She was unable to ambulate s/p fall. She was able to call her neighbor who came to check on her and called 911. She was brought into the ER for evaluation. Work-up in ER showed UTI and R intertrochanteric femur fracture. She was admitted to the hospital for further evaluation and treatment. Hospital course:    R femur fracture 2/ fall-ortho consult, underwent IM nailing on , pain mgmt, PT/OT eval, recommending SNF at discharge, SQ lovenox for DVT ppx --> ASA 81 mg BID x 30 days at discharge, WBAT, F/U with ortho after discharge  Fibromuscular dysplasia of the vertebral artery-ASA 81 mg daily, F/U with vascular after discharge  UTI-IV rocephin  Vaginal prolapse-s/p reduction by Uro-GYN on , as prolapse was interfering with surgery for fracture, pt. to F/U after discharge with Uro-GYN (Dr. Walter)  Post-op anemia/RUDDY/vaginal bleeding-s/p 3 units PRBCs, IV venofer, CBC monitored     Pt. was HDS and discharged to [facility] on 23. Today, patient denies dizziness, HA, SOB, cough, chest pain, dysuria, constipation, or appetite changes. Pt. is s/p Augmentin for R hip surgical incision infection. She reports resolution of swelling and pain of R hip. She reports improvement in R knee pain s/p cortisone injection on  and with use of compression sleeve during therapy. She denies any vaginal bleeding related to her vaginal prolapse. She is followed by Gyno. Staff report no other clinical concerns at this time.     ROS:    As above in HPI. Otherwise, all other systems have been reviewed and are negative for complaint.    Medications reviewed  and verified in NH chart.     Patient Active Problem List   Diagnosis   • Primary osteoarthritis involving multiple joints   • Subclinical hypothyroidism   • Venous insufficiency   • Fibromuscular dysplasia of carotid artery (CMS/HCC)   • Osteoporosis   • Closed displaced comminuted fracture of shaft of right femur with routine healing   • UTI (urinary tract infection)   • Physical deconditioning   • Anemia   • Asthenia   • At risk for falls   • Sleep disorder   • S/P right hip fracture   • Vaginal prolapse   • Anxiety        Past Medical History:   Diagnosis Date   • Fibromuscular dysplasia of carotid artery (CMS/HCC)    • Heart attack (CMS/HCC)    • OAB (overactive bladder)    • Osteoporosis    • PMB (postmenopausal bleeding)    • Skin cancer of face    • Uterine prolapse        Past Surgical History:   Procedure Laterality Date   • CARDIAC CATHETERIZATION Left    • CT HEAD ANGIO W AND WO IV CONTRAST  2023    CT HEAD ANGIO W AND WO IV CONTRAST GEN CT   • CT NECK ANGIO W AND WO IV CONTRAST  2023    CT NECK ANGIO W AND WO IV CONTRAST GEN CT   • MR HEAD ANGIO W AND WO IV CONTRAST  2023    MR HEAD ANGIO W AND WO IV CONTRAST GEN MRI   • MR NECK ANGIO W AND WO IV CONTRAST  2023    MR NECK ANGIO W AND WO IV CONTRAST GEN MRI       Family History   Problem Relation Name Age of Onset   • Rheum arthritis Sister     • Other (trigeminal neuralgia) Son     • Anxiety disorder Son     • Rheum arthritis Mother's Sister         Social History     Tobacco Use   Smoking Status Former   • Types: Cigarettes   • Quit date:    • Years since quittin.7   Smokeless Tobacco Never       Social History     Substance and Sexual Activity   Alcohol Use Never       Social History     Substance and Sexual Activity   Drug Use Never       No Known Allergies     Vital Signs:   125/76-75-18-97.6-95% on RA     Physical Exam:  General: Sitting up in WC in NAD, alert   Head/Face: NCAT, symmetrical  Eyes: PERRLA, no  injection, no discharge  ENT: Hearing not impaired, ears without scars or lesions, nasal mucosa and turbinates pink, septum midline, lips pink and moist  Neck: Supple, symmetrical  Respiratory: CTA without adventitious sounds, even and nonlabored without use of accessory muscles, good air exchange  Cardio: RRR without murmur or gallops, normal S1S2, no edema, pedal pulses 3+/4 bilaterally  Chest/Breast: Symmetrical  GI: BS x 4, normoactive, non-distended, abd round and soft, no masses or tenderness  : No suprapubic tenderness or distention, + vaginal prolapse  MSK: Gait not assessed, joints with full ROM without pain or contractures  Skin: Skin warm and dry, no induration, surgical incision to R hip/R femur well-approximated and ANGEL  Neurologic: Cranial nerves II through XII intact, superficial touch and pain sensation intact  Psychiatric: Alert to person, place, and time, calm and cooperative     Results/Data:   7/12/23: Hgb 10.7, Hct 34.5  7/5/23: WBC 3.79, Hgb 10.1, Hct 31.5  6/28/23: Na+ 136, Lauri 8.4, Hgb 9.4, Hct 30.4  6/21/23: Na+ 135, Hgb 8.9, Hct 27.3     Assessment/Plan:  Right femur fracture 2/2 fall-s/p IM nailing on 6/14, WBAT to RLE, Tylenol prn for pain, completed 30-days of lovenox for DVT ppx, monitor CBC, F/U with ortho as scheduled  Physical deconditioning/fall-c/w PT/OT, safety and fall precautions   UTI-s/p IV rocephin, denies symptoms  Vaginal prolapse-s/p reduction by Uro-Gyn on 6/14, F/U with Dr. Son as scheduled   Anemia-post-operative/iron-deficiency/vaginal bleeding related, s/p 3 units PRBCs, IV venofer x 1, monitor CBC  Fibromuscular dysplasia of the vertebral artery-c/w ASA 81 mg daily, F/U with vascular after discharge  Osteoporosis-WB exercises as tolerated, c/w calcium + vit D  Venous insufficiency-JOB hose to BLE, elevate legs, monitor neurovascular status  R knee pain-XR showed arthritis, c/w Tylenol ES prn and Aspercreme prn, s/p cortisone injection on 7/20 by ortho,  continue to encourage ROM, may use compression sleeve to R knee, F/U with ortho as scheduled  R hip surgical wound infection-RESOLVED, s/p augmentin    Orders:  CBC w/ diff and BMP on 8/23    Code Status:   DNR-CCA      Electronically Signed By: ANIBAL Joshi-CNP   9/13/23 10:59 PM

## 2023-08-22 NOTE — PROGRESS NOTES
Chief Complaint:   SNF F/U  R femur fracture  Physical deconditioning/fall  UTI  Vaginal prolapse     HPI:   82 year-old female presenting to the ER on 6/13/23 w/ c/o R hip pain s/p fall. Pt. reported that she was walking in her home w/ her walker when she lost her balance and fell onto her R side. She was unable to ambulate s/p fall. She was able to call her neighbor who came to check on her and called 911. She was brought into the ER for evaluation. Work-up in ER showed UTI and R intertrochanteric femur fracture. She was admitted to the hospital for further evaluation and treatment. Hospital course:    R femur fracture 2/2 fall-ortho consult, underwent IM nailing on 6/14, pain mgmt, PT/OT eval, recommending SNF at discharge, SQ lovenox for DVT ppx --> ASA 81 mg BID x 30 days at discharge, WBAT, F/U with ortho after discharge  Fibromuscular dysplasia of the vertebral artery-ASA 81 mg daily, F/U with vascular after discharge  UTI-IV rocephin  Vaginal prolapse-s/p reduction by Uro-GYN on 6/14, as prolapse was interfering with surgery for fracture, pt. to F/U after discharge with Uro-GYN (Dr. Walter)  Post-op anemia/RUDDY/vaginal bleeding-s/p 3 units PRBCs, IV venofer, CBC monitored     Pt. was HDS and discharged to [facility] on 6/20/23. Today, patient denies dizziness, HA, SOB, cough, chest pain, dysuria, constipation, or appetite changes. Pt. is s/p Augmentin for R hip surgical incision infection. She reports resolution of swelling and pain of R hip. She reports improvement in R knee pain s/p cortisone injection on 7/20 and with use of compression sleeve during therapy. She denies any vaginal bleeding related to her vaginal prolapse. Staff report no other clinical concerns at this time.     ROS:    As above in HPI. Otherwise, all other systems have been reviewed and are negative for complaint.    Medications reviewed and verified in NH chart.     Patient Active Problem List   Diagnosis    Primary osteoarthritis  involving multiple joints    Subclinical hypothyroidism    Venous insufficiency    Fibromuscular dysplasia of carotid artery (CMS/HCC)    Osteoporosis    Closed displaced comminuted fracture of shaft of right femur with routine healing    UTI (urinary tract infection)    Physical deconditioning    Anemia    Asthenia    At risk for falls    Sleep disorder    S/P right hip fracture    Vaginal prolapse    Anxiety        Past Medical History:   Diagnosis Date    Fibromuscular dysplasia of carotid artery (CMS/HCC)     Heart attack (CMS/HCC)     OAB (overactive bladder)     Osteoporosis     PMB (postmenopausal bleeding)     Skin cancer of face     Uterine prolapse        Past Surgical History:   Procedure Laterality Date    CARDIAC CATHETERIZATION Left     CT HEAD ANGIO W AND WO IV CONTRAST  2023    CT HEAD ANGIO W AND WO IV CONTRAST GEN CT    CT NECK ANGIO W AND WO IV CONTRAST  2023    CT NECK ANGIO W AND WO IV CONTRAST GEN CT    MR HEAD ANGIO W AND WO IV CONTRAST  2023    MR HEAD ANGIO W AND WO IV CONTRAST GEN MRI    MR NECK ANGIO W AND WO IV CONTRAST  2023    MR NECK ANGIO W AND WO IV CONTRAST GEN MRI       Family History   Problem Relation Name Age of Onset    Rheum arthritis Sister      Other (trigeminal neuralgia) Son      Anxiety disorder Son      Rheum arthritis Mother's Sister         Social History     Tobacco Use   Smoking Status Former    Types: Cigarettes    Quit date:     Years since quittin.6   Smokeless Tobacco Never       Social History     Substance and Sexual Activity   Alcohol Use Never       Social History     Substance and Sexual Activity   Drug Use Never       No Known Allergies     Vital Signs:   122/73-77-16-98.0-98% on RA     Physical Exam:  General: Sitting up in WC in NAD, alert   Head/Face: NCAT, symmetrical  Eyes: PERRLA, no injection, no discharge  ENT: Hearing not impaired, ears without scars or lesions, nasal mucosa and turbinates pink, septum midline,  lips pink and moist  Neck: Supple, symmetrical  Respiratory: CTA without adventitious sounds, even and nonlabored without use of accessory muscles, good air exchange  Cardio: RRR without murmur or gallops, normal S1S2, no edema, pedal pulses 3+/4 bilaterally  Chest/Breast: Symmetrical  GI: BS x 4, normoactive, non-distended, abd round and soft, no masses or tenderness  : No suprapubic tenderness or distention, + vaginal prolapse  MSK: Gait not assessed, joints with full ROM without pain or contractures  Skin: Skin warm and dry, no induration, surgical incision to R hip/R femur well-approximated and ANGEL  Neurologic: Cranial nerves II through XII intact, superficial touch and pain sensation intact  Psychiatric: Alert to person, place, and time, calm and cooperative     Results/Data:   7/12/23: Hgb 10.7, Hct 34.5  7/5/23: WBC 3.79, Hgb 10.1, Hct 31.5  6/28/23: Na+ 136, Lauri 8.4, Hgb 9.4, Hct 30.4  6/21/23: Na+ 135, Hgb 8.9, Hct 27.3     Assessment/Plan:  Right femur fracture 2/2 fall-s/p IM nailing on 6/14, WBAT to RLE, Tylenol prn for pain (patient requesting that scheduled Tylenol ES be discontinued at this time), completed 30-days of lovenox for DVT ppx, monitor CBC, F/U with ortho as scheduled  Physical deconditioning/fall-c/w PT/OT, safety and fall precautions   UTI-s/p IV rocephin, denies symptoms  Vaginal prolapse-s/p reduction by Uro-Gyn on 6/14, F/U with Dr. Son as scheduled (8/8)  Anemia-post-operative/iron-deficiency/vaginal bleeding related, s/p 3 units PRBCs, IV venofer x 1, monitor CBC  Fibromuscular dysplasia of the vertebral artery-c/w ASA 81 mg daily, F/U with vascular after discharge  Osteoporosis-WB exercises as tolerated, c/w calcium + vit D  Venous insufficiency-JOB hose to BLE, elevate legs, monitor neurovascular status  R knee pain-XR showed arthritis, c/w Tylenol ES and Aspercreme BID, s/p cortisone injection on 7/20 by ortho, continue to encourage ROM, may use compression sleeve to R  knee, F/U with ortho as scheduled  R hip surgical wound infection-RESOLVED, s/p augmentin    Orders:  D/C Tylenol ES routine     Code Status:   DNR-CCA

## 2023-08-26 ENCOUNTER — NURSING HOME VISIT (OUTPATIENT)
Dept: POST ACUTE CARE | Facility: EXTERNAL LOCATION | Age: 83
End: 2023-08-26
Payer: MEDICARE

## 2023-08-26 DIAGNOSIS — N39.0 URINARY TRACT INFECTION WITHOUT HEMATURIA, SITE UNSPECIFIED: ICD-10-CM

## 2023-08-26 DIAGNOSIS — R53.1 ASTHENIA: Primary | ICD-10-CM

## 2023-08-26 DIAGNOSIS — F43.29 ADJUSTMENT DISORDER WITH OTHER SYMPTOM: ICD-10-CM

## 2023-08-26 DIAGNOSIS — Z87.81 S/P RIGHT HIP FRACTURE: ICD-10-CM

## 2023-08-26 DIAGNOSIS — Z91.81 AT RISK FOR FALLS: ICD-10-CM

## 2023-08-26 DIAGNOSIS — G47.09 OTHER INSOMNIA: ICD-10-CM

## 2023-08-26 PROCEDURE — 99307 SBSQ NF CARE SF MDM 10: CPT | Performed by: INTERNAL MEDICINE

## 2023-08-26 NOTE — LETTER
Patient: Deisi Barkley  : 1940    Encounter Date: 2023    NAME OF THE PATIENT: Deisi Barkley    YOB: 1940    PLACE OF SERVICE:  [facility]    This is a subsequent visit.    HPI:  Ms. Deisi Barkley is an 83-year-old female who is status post right hip fracture.  She continues to have weakness and deconditioning, and continues to be a fall risk.  She requires supportive care.    PAST MEDICAL HISTORY:  As per nursing home list.  Right hip fracture, vaginal prolapse, weakness, frequent falls, UTI, adjustment disorder and insomnia.    CURRENT MEDICATIONS:  As per nursing home list.  Reviewed.    ALLERGIES:  As per nursing home list.  No known allergies.    SOCIAL HISTORY:  As per nursing home list. The patient denies use of alcohol or tobacco.    FAMILY HISTORY:  As per nursing home list.  Positive history of hypertension in this patient's family.    REVIEW OF SYSTEMS:  All 12 systems reviewed and pertaining covered in history and physical, the rest are negative.  The patient denies any fevers, chills or chest pain at this time.    PHYSICAL EXAMINATION  GENERAL: This is a well-developed and well-nourished female, lying in bed, appearing weak and lethargic.  VITAL SIGNS:  As recorded and reviewed from EMR.  Blood pressure 118/72.  Pulse 74.  Respirations 16.  Temperature 97.7.  EYES:  The patient's sclerae white.  The pupils were equal and round.  ENT:  The patient's external ears were normal and the otoscopic examination was negative.  NECK:  Supple.  There were no palpable masses.  Thyroid was not enlarged and there were no carotid bruits.  RESPIRATORY:  The patient had normal inspirations and expirations.  The breath sounds were equal bilaterally and clear to auscultation.  CARDIOVASCULAR:  The patient had S1 normal, split S2 without obvious rubs, clicks, or murmurs.  GASTROINTESTINAL:  There was no hepatosplenomegaly.  There were no palpable masses  and no inguinal nodes.  LYMPHATIC:  The patient had no axillary, groin, or lymphadenopathy.  MUSCULOSKELETAL:  The patient had normal gait.  The joints appeared to be normal without evidence of deformity.  Grossly the muscles had good range of motion and were without effusion.  EXTREMITIES:  There was no evidence of peripheral edema.  NEUROLOGIC:  The patient had normal cranial nerves.  The reflexes, sensory, and motor examination were grossly within normal limits.  PSYCHIATRIC: The patient had normal judgment and insight.  The patient was oriented to person, place, and time, and had no obvious mood defect including depression, anxiety, and/or agitation.    LAB WORK: Laboratory studies were reviewed from prior visit.    ASSESSMENT AND PLAN:  Status post hip fracture, continue to monitor.  Weakness, on PT/OT.  Fall risk, on fall precaution.  Adjustment disorder, on medication.  Insomnia, on melatonin.  History of UTI, continue to monitor.  Medication list reviewed and discussed with the family.  Hospital chart reviewed.      Electronically Signed By: Lucero Lacy MD   10/5/23 11:33 AM

## 2023-08-28 NOTE — PROGRESS NOTES
"Called patient regarding Dr. Abrams's request bellow:    \"Needs DXA and BMP before the prolia. - all is ordered Get DXA and then see me.    Dr. Abrams\"    Spoke with Rubi who assists Muna with scheduling and transportation. Notified Rubi of the need for BMP and DEXA scan prior to Prolia injection. Let Rubi know that the [facility] will be reaching out to her to assist with scheduling. Rubi did tell me that patient is in a power wheelchair, cannot move self, and will need karen lift in order to get into the scanner. She told me this was how they did it last time in 2020. Rubi voiced verbal understanding and will let Muna know.       " Chief Complaint:   SNF F/U  R femur fracture  Physical deconditioning/fall  UTI  Vaginal prolapse     HPI:   82 year-old female presenting to the ER on 6/13/23 w/ c/o R hip pain s/p fall. Pt. reported that she was walking in her home w/ her walker when she lost her balance and fell onto her R side. She was unable to ambulate s/p fall. She was able to call her neighbor who came to check on her and called 911. She was brought into the ER for evaluation. Work-up in ER showed UTI and R intertrochanteric femur fracture. She was admitted to the hospital for further evaluation and treatment. Hospital course:    R femur fracture 2/2 fall-ortho consult, underwent IM nailing on 6/14, pain mgmt, PT/OT eval, recommending SNF at discharge, SQ lovenox for DVT ppx --> ASA 81 mg BID x 30 days at discharge, WBAT, F/U with ortho after discharge  Fibromuscular dysplasia of the vertebral artery-ASA 81 mg daily, F/U with vascular after discharge  UTI-IV rocephin  Vaginal prolapse-s/p reduction by Uro-GYN on 6/14, as prolapse was interfering with surgery for fracture, pt. to F/U after discharge with Uro-GYN (Dr. Walter)  Post-op anemia/RUDDY/vaginal bleeding-s/p 3 units PRBCs, IV venofer, CBC monitored     Pt. was HDS and discharged to [facility] on 6/20/23. Today, patient denies dizziness, HA, SOB, cough, chest pain, dysuria, constipation, or appetite changes. Pt. is s/p Augmentin for R hip surgical incision infection. She reports resolution of swelling and pain of R hip. She reports improvement in R knee pain s/p cortisone injection on 7/20 and with use of compression sleeve during therapy. She denies any vaginal bleeding related to her vaginal prolapse. She is scheduled to F/U with Gyno this afternoon. Staff report no other clinical concerns at this time.     ROS:    As above in HPI. Otherwise, all other systems have been reviewed and are negative for complaint.    Medications reviewed and verified in NH chart.     Patient Active  Problem List   Diagnosis    Primary osteoarthritis involving multiple joints    Subclinical hypothyroidism    Venous insufficiency    Fibromuscular dysplasia of carotid artery (CMS/HCC)    Osteoporosis    Closed displaced comminuted fracture of shaft of right femur with routine healing    UTI (urinary tract infection)    Physical deconditioning    Anemia    Asthenia    At risk for falls    Sleep disorder    S/P right hip fracture    Vaginal prolapse    Anxiety        Past Medical History:   Diagnosis Date    Fibromuscular dysplasia of carotid artery (CMS/HCC)     Heart attack (CMS/HCC)     OAB (overactive bladder)     Osteoporosis     PMB (postmenopausal bleeding)     Skin cancer of face     Uterine prolapse        Past Surgical History:   Procedure Laterality Date    CARDIAC CATHETERIZATION Left     CT HEAD ANGIO W AND WO IV CONTRAST  2023    CT HEAD ANGIO W AND WO IV CONTRAST GEN CT    CT NECK ANGIO W AND WO IV CONTRAST  2023    CT NECK ANGIO W AND WO IV CONTRAST GEN CT    MR HEAD ANGIO W AND WO IV CONTRAST  2023    MR HEAD ANGIO W AND WO IV CONTRAST GEN MRI    MR NECK ANGIO W AND WO IV CONTRAST  2023    MR NECK ANGIO W AND WO IV CONTRAST GEN MRI       Family History   Problem Relation Name Age of Onset    Rheum arthritis Sister      Other (trigeminal neuralgia) Son      Anxiety disorder Son      Rheum arthritis Mother's Sister         Social History     Tobacco Use   Smoking Status Former    Types: Cigarettes    Quit date:     Years since quittin.6   Smokeless Tobacco Never       Social History     Substance and Sexual Activity   Alcohol Use Never       Social History     Substance and Sexual Activity   Drug Use Never       No Known Allergies     Vital Signs:   124/76-68-18-98.0-98% on RA     Physical Exam:  General: Sitting up in WC in NAD, alert   Head/Face: NCAT, symmetrical  Eyes: PERRLA, no injection, no discharge  ENT: Hearing not impaired, ears without scars or lesions,  nasal mucosa and turbinates pink, septum midline, lips pink and moist  Neck: Supple, symmetrical  Respiratory: CTA without adventitious sounds, even and nonlabored without use of accessory muscles, good air exchange  Cardio: RRR without murmur or gallops, normal S1S2, no edema, pedal pulses 3+/4 bilaterally  Chest/Breast: Symmetrical  GI: BS x 4, normoactive, non-distended, abd round and soft, no masses or tenderness  : No suprapubic tenderness or distention, + vaginal prolapse  MSK: Gait not assessed, joints with full ROM without pain or contractures  Skin: Skin warm and dry, no induration, surgical incision to R hip/R femur well-approximated and ANGEL  Neurologic: Cranial nerves II through XII intact, superficial touch and pain sensation intact  Psychiatric: Alert to person, place, and time, calm and cooperative     Results/Data:   7/12/23: Hgb 10.7, Hct 34.5  7/5/23: WBC 3.79, Hgb 10.1, Hct 31.5  6/28/23: Na+ 136, Lauri 8.4, Hgb 9.4, Hct 30.4  6/21/23: Na+ 135, Hgb 8.9, Hct 27.3     Assessment/Plan:  Right femur fracture 2/2 fall-s/p IM nailing on 6/14, WBAT to RLE, Tylenol prn for pain, completed 30-days of lovenox for DVT ppx, monitor CBC, F/U with ortho as scheduled  Physical deconditioning/fall-c/w PT/OT, safety and fall precautions   UTI-s/p IV rocephin, denies symptoms  Vaginal prolapse-s/p reduction by Uro-Gyn on 6/14, F/U with Dr. Son as scheduled (8/8)  Anemia-post-operative/iron-deficiency/vaginal bleeding related, s/p 3 units PRBCs, IV venofer x 1, monitor CBC  Fibromuscular dysplasia of the vertebral artery-c/w ASA 81 mg daily, F/U with vascular after discharge  Osteoporosis-WB exercises as tolerated, c/w calcium + vit D  Venous insufficiency-JOB hose to BLE, elevate legs, monitor neurovascular status  R knee pain-XR showed arthritis, c/w Tylenol ES and Aspercreme BID, s/p cortisone injection on 7/20 by ortho, continue to encourage ROM, may use compression sleeve to R knee, F/U with ortho as  scheduled  R hip surgical wound infection-RESOLVED, s/p augmentin    Orders:  NNO    Code Status:   DNR-CCA

## 2023-08-29 ENCOUNTER — NURSING HOME VISIT (OUTPATIENT)
Dept: POST ACUTE CARE | Facility: EXTERNAL LOCATION | Age: 83
End: 2023-08-29
Payer: MEDICARE

## 2023-08-29 DIAGNOSIS — N39.0 URINARY TRACT INFECTION WITHOUT HEMATURIA, SITE UNSPECIFIED: ICD-10-CM

## 2023-08-29 DIAGNOSIS — M81.0 AGE RELATED OSTEOPOROSIS, UNSPECIFIED PATHOLOGICAL FRACTURE PRESENCE: ICD-10-CM

## 2023-08-29 DIAGNOSIS — I77.3 FIBROMUSCULAR DYSPLASIA OF CAROTID ARTERY (CMS-HCC): ICD-10-CM

## 2023-08-29 DIAGNOSIS — N93.9 VAGINAL BLEEDING: ICD-10-CM

## 2023-08-29 DIAGNOSIS — R53.81 PHYSICAL DECONDITIONING: ICD-10-CM

## 2023-08-29 DIAGNOSIS — I87.2 VENOUS INSUFFICIENCY: ICD-10-CM

## 2023-08-29 DIAGNOSIS — S72.351D CLOSED DISPLACED COMMINUTED FRACTURE OF SHAFT OF RIGHT FEMUR WITH ROUTINE HEALING: Primary | ICD-10-CM

## 2023-08-29 DIAGNOSIS — T81.49XA SURGICAL WOUND INFECTION: ICD-10-CM

## 2023-08-29 DIAGNOSIS — N81.10 VAGINAL PROLAPSE: ICD-10-CM

## 2023-08-29 DIAGNOSIS — D64.9 ANEMIA, UNSPECIFIED TYPE: ICD-10-CM

## 2023-08-29 PROCEDURE — 99309 SBSQ NF CARE MODERATE MDM 30: CPT | Performed by: NURSE PRACTITIONER

## 2023-08-29 NOTE — LETTER
Patient: Deisi Barkley  : 1940    Encounter Date: 2023    Chief Complaint:   SNF F/U  R femur fracture  Physical deconditioning/fall  UTI  Vaginal prolapse     HPI:   82 year-old female presenting to the ER on 23 w/ c/o R hip pain s/p fall. Pt. reported that she was walking in her home w/ her walker when she lost her balance and fell onto her R side. She was unable to ambulate s/p fall. She was able to call her neighbor who came to check on her and called 911. She was brought into the ER for evaluation. Work-up in ER showed UTI and R intertrochanteric femur fracture. She was admitted to the hospital for further evaluation and treatment. Hospital course:    R femur fracture 2/ fall-ortho consult, underwent IM nailing on , pain mgmt, PT/OT eval, recommending SNF at discharge, SQ lovenox for DVT ppx --> ASA 81 mg BID x 30 days at discharge, WBAT, F/U with ortho after discharge  Fibromuscular dysplasia of the vertebral artery-ASA 81 mg daily, F/U with vascular after discharge  UTI-IV rocephin  Vaginal prolapse-s/p reduction by Uro-GYN on , as prolapse was interfering with surgery for fracture, pt. to F/U after discharge with Uro-GYN (Dr. Walter)  Post-op anemia/RUDDY/vaginal bleeding-s/p 3 units PRBCs, IV venofer, CBC monitored     Pt. was HDS and discharged to [facility] on 23. Today, patient denies dizziness, HA, SOB, cough, chest pain, dysuria, constipation, or appetite changes. Pt. is s/p Augmentin for R hip surgical incision infection. She reports resolution of swelling and pain of R hip. She reports improvement in R knee pain s/p cortisone injection on  and with use of compression sleeve during therapy. She denies any vaginal bleeding related to her vaginal prolapse. She is followed by Gyno. Staff report no other clinical concerns at this time.     ROS:    As above in HPI. Otherwise, all other systems have been reviewed and are negative for complaint.    Medications reviewed  and verified in NH chart.     Patient Active Problem List   Diagnosis   • Primary osteoarthritis involving multiple joints   • Subclinical hypothyroidism   • Venous insufficiency   • Fibromuscular dysplasia of carotid artery (CMS/HCC)   • Osteoporosis   • Closed displaced comminuted fracture of shaft of right femur with routine healing   • UTI (urinary tract infection)   • Physical deconditioning   • Anemia   • Asthenia   • At risk for falls   • Sleep disorder   • S/P right hip fracture   • Vaginal prolapse   • Anxiety        Past Medical History:   Diagnosis Date   • Fibromuscular dysplasia of carotid artery (CMS/HCC)    • Heart attack (CMS/HCC)    • OAB (overactive bladder)    • Osteoporosis    • PMB (postmenopausal bleeding)    • Skin cancer of face    • Uterine prolapse        Past Surgical History:   Procedure Laterality Date   • CARDIAC CATHETERIZATION Left    • CT HEAD ANGIO W AND WO IV CONTRAST  2023    CT HEAD ANGIO W AND WO IV CONTRAST GEN CT   • CT NECK ANGIO W AND WO IV CONTRAST  2023    CT NECK ANGIO W AND WO IV CONTRAST GEN CT   • MR HEAD ANGIO W AND WO IV CONTRAST  2023    MR HEAD ANGIO W AND WO IV CONTRAST GEN MRI   • MR NECK ANGIO W AND WO IV CONTRAST  2023    MR NECK ANGIO W AND WO IV CONTRAST GEN MRI       Family History   Problem Relation Name Age of Onset   • Rheum arthritis Sister     • Other (trigeminal neuralgia) Son     • Anxiety disorder Son     • Rheum arthritis Mother's Sister         Social History     Tobacco Use   Smoking Status Former   • Types: Cigarettes   • Quit date:    • Years since quittin.7   Smokeless Tobacco Never       Social History     Substance and Sexual Activity   Alcohol Use Never       Social History     Substance and Sexual Activity   Drug Use Never       No Known Allergies     Vital Signs:   120/72-72-18-97.8-97% on RA     Physical Exam:  General: Sitting up in WC in NAD, alert   Head/Face: NCAT, symmetrical  Eyes: PERRLA, no  injection, no discharge  ENT: Hearing not impaired, ears without scars or lesions, nasal mucosa and turbinates pink, septum midline, lips pink and moist  Neck: Supple, symmetrical  Respiratory: CTA without adventitious sounds, even and nonlabored without use of accessory muscles, good air exchange  Cardio: RRR without murmur or gallops, normal S1S2, no edema, pedal pulses 3+/4 bilaterally  Chest/Breast: Symmetrical  GI: BS x 4, normoactive, non-distended, abd round and soft, no masses or tenderness  : No suprapubic tenderness or distention, + vaginal prolapse  MSK: Gait not assessed, joints with full ROM without pain or contractures  Skin: Skin warm and dry, no induration, healed surgical incision to R hip/R femur  Neurologic: Cranial nerves II through XII intact, superficial touch and pain sensation intact  Psychiatric: Alert to person, place, and time, calm and cooperative     Results/Data:   8/23/23: Glu 70, WBC 3.39, Hgb 11.9  7/12/23: Hgb 10.7, Hct 34.5  7/5/23: WBC 3.79, Hgb 10.1, Hct 31.5  6/28/23: Na+ 136, Lauri 8.4, Hgb 9.4, Hct 30.4  6/21/23: Na+ 135, Hgb 8.9, Hct 27.3     Assessment/Plan:  Right femur fracture 2/2 fall-s/p IM nailing on 6/14, WBAT to RLE, Tylenol prn for pain, completed 30-days of lovenox for DVT ppx, monitor CBC, F/U with ortho as scheduled (9/7)  Physical deconditioning/fall-c/w PT/OT, safety and fall precautions   UTI-s/p IV rocephin, denies symptoms  Vaginal prolapse-s/p reduction by Uro-Gyn on 6/14, F/U with Dr. Son as scheduled   Anemia-post-operative/iron-deficiency/vaginal bleeding related, s/p 3 units PRBCs, IV venofer x 1, monitor CBC  Fibromuscular dysplasia of the vertebral artery-c/w ASA 81 mg daily, F/U with vascular after discharge  Osteoporosis-WB exercises as tolerated, c/w calcium + vit D  Venous insufficiency-elevate legs, monitor neurovascular status  R knee pain-XR showed arthritis, c/w Tylenol ES prn and Aspercreme prn, s/p cortisone injection on 7/20 by ortho,  continue to encourage ROM, may use compression sleeve to R knee, F/U with ortho as scheduled  R hip surgical wound infection-RESOLVED, s/p augmentin    Orders:  NNO    Code Status:   DNR-CCA      Electronically Signed By: BUBBA Joshi   9/19/23  8:47 PM

## 2023-09-03 ENCOUNTER — NURSING HOME VISIT (OUTPATIENT)
Dept: POST ACUTE CARE | Facility: EXTERNAL LOCATION | Age: 83
End: 2023-09-03
Payer: MEDICARE

## 2023-09-03 DIAGNOSIS — F43.29 ADJUSTMENT DISORDER WITH OTHER SYMPTOM: ICD-10-CM

## 2023-09-03 DIAGNOSIS — N39.0 URINARY TRACT INFECTION WITHOUT HEMATURIA, SITE UNSPECIFIED: ICD-10-CM

## 2023-09-03 DIAGNOSIS — G47.09 OTHER INSOMNIA: ICD-10-CM

## 2023-09-03 DIAGNOSIS — R53.1 ASTHENIA: ICD-10-CM

## 2023-09-03 DIAGNOSIS — Z87.81 S/P RIGHT HIP FRACTURE: Primary | ICD-10-CM

## 2023-09-03 DIAGNOSIS — Z91.81 AT RISK FOR FALLS: ICD-10-CM

## 2023-09-03 PROCEDURE — 99308 SBSQ NF CARE LOW MDM 20: CPT | Performed by: INTERNAL MEDICINE

## 2023-09-03 NOTE — LETTER
Patient: Deisi Barkley  : 1940    Encounter Date: 2023    NAME OF THE PATIENT: Deisi Barkley    YOB: 1940    PLACE OF SERVICE:  [facility].    This is a subsequent visit.    HPI:  Ms. Deisi Barkley is an 83-year-old female with history of right femur fracture.  She suffers from weakness and deconditioning.  She has a difficult time ambulating.  She requires supportive care.    PAST MEDICAL HISTORY:  As per nursing home list.  Right hip fracture, vaginal prolapse, weakness, frequent falls, UTI, adjustment disorder and insomnia.    CURRENT MEDICATIONS:  As per nursing home list.  Reviewed.    ALLERGIES:  As per nursing home list.  No known allergies.    SOCIAL HISTORY:  As per nursing home list. The patient denies use of alcohol or tobacco.    FAMILY HISTORY:  As per nursing home list.  Positive history of hypertension in this patient's family.    REVIEW OF SYSTEMS:  All 12 systems reviewed and pertaining covered in history and physical, the rest are negative.  The patient denies any fevers, chills or chest pain at this time.    PHYSICAL EXAMINATION  GENERAL: This is a well-developed, well-nourished female, lying in bed, appearing weak and lethargic.  VITAL SIGNS:  As recorded and reviewed from EMR.  Blood pressure 118/68.  Pulse 72.  Respirations 16.  Temperature 97.7.  EYES:  The patient's sclerae white.  The pupils were equal and round.  ENT:  The patient's external ears were normal and the otoscopic examination was negative.  NECK:  Supple.  There were no palpable masses.  Thyroid was not enlarged and there were no carotid bruits.  RESPIRATORY:  The patient had normal inspirations and expirations.  The breath sounds were equal bilaterally and clear to auscultation.  CARDIOVASCULAR:  The patient had S1 normal, split S2 without obvious rubs, clicks, or murmurs.  GASTROINTESTINAL:  There was no hepatosplenomegaly.  There were no palpable masses and  no inguinal nodes.  LYMPHATIC:  The patient had no axillary, groin, or lymphadenopathy.  MUSCULOSKELETAL:  The patient had normal gait.  The joints appeared to be normal without evidence of deformity.  Grossly the muscles had good range of motion and were without effusion.  EXTREMITIES:  The patient's right hip wound is intact with no sign of infection.  NEUROLOGIC:  The patient had normal cranial nerves.  The reflexes, sensory, and motor examination were grossly within normal limits.  PSYCHIATRIC: The patient had normal judgment and insight.  The patient was oriented to person, place, and time, and had no obvious mood defect including depression, anxiety, and/or agitation.    LAB WORK: Laboratory studies were reviewed from prior visit.    ASSESSMENT AND PLAN:  Right hip fracture, on pain control.  Weakness, on PT/OT.  Fall risk, on fall precautions.  Adjustment disorder, on medication.  Insomnia, on melatonin  Recent UTI, continue to monitor.  Medication list reviewed and discussed with the family.  Hospital chart reviewed.      Electronically Signed By: Lucero Lacy MD   10/5/23 11:34 AM

## 2023-09-08 ENCOUNTER — NURSING HOME VISIT (OUTPATIENT)
Dept: POST ACUTE CARE | Facility: EXTERNAL LOCATION | Age: 83
End: 2023-09-08
Payer: MEDICARE

## 2023-09-08 DIAGNOSIS — R53.81 PHYSICAL DECONDITIONING: ICD-10-CM

## 2023-09-08 DIAGNOSIS — N81.10 VAGINAL PROLAPSE: ICD-10-CM

## 2023-09-08 DIAGNOSIS — S72.351D CLOSED DISPLACED COMMINUTED FRACTURE OF SHAFT OF RIGHT FEMUR WITH ROUTINE HEALING: Primary | ICD-10-CM

## 2023-09-08 DIAGNOSIS — D64.9 ANEMIA, UNSPECIFIED TYPE: ICD-10-CM

## 2023-09-08 DIAGNOSIS — I77.3 FIBROMUSCULAR DYSPLASIA OF CAROTID ARTERY (CMS-HCC): ICD-10-CM

## 2023-09-08 DIAGNOSIS — M81.0 AGE RELATED OSTEOPOROSIS, UNSPECIFIED PATHOLOGICAL FRACTURE PRESENCE: ICD-10-CM

## 2023-09-08 DIAGNOSIS — T81.49XA SURGICAL WOUND INFECTION: ICD-10-CM

## 2023-09-08 DIAGNOSIS — N39.0 URINARY TRACT INFECTION WITHOUT HEMATURIA, SITE UNSPECIFIED: ICD-10-CM

## 2023-09-08 PROCEDURE — 99309 SBSQ NF CARE MODERATE MDM 30: CPT | Performed by: NURSE PRACTITIONER

## 2023-09-08 NOTE — Clinical Note
Patient: Deisi Barkley  : 1940    Encounter Date: 2023    No notes on file    Electronically Signed By: BUBBA Joshi   23  8:32 PM

## 2023-09-09 NOTE — PROGRESS NOTES
Chief Complaint:   SNF F/U  R femur fracture  Physical deconditioning/fall  UTI  Vaginal prolapse     HPI:   82 year-old female presenting to the ER on 6/13/23 w/ c/o R hip pain s/p fall. Pt. reported that she was walking in her home w/ her walker when she lost her balance and fell onto her R side. She was unable to ambulate s/p fall. She was able to call her neighbor who came to check on her and called 911. She was brought into the ER for evaluation. Work-up in ER showed UTI and R intertrochanteric femur fracture. She was admitted to the hospital for further evaluation and treatment. Hospital course:    R femur fracture 2/2 fall-ortho consult, underwent IM nailing on 6/14, pain mgmt, PT/OT eval, recommending SNF at discharge, SQ lovenox for DVT ppx --> ASA 81 mg BID x 30 days at discharge, WBAT, F/U with ortho after discharge  Fibromuscular dysplasia of the vertebral artery-ASA 81 mg daily, F/U with vascular after discharge  UTI-IV rocephin  Vaginal prolapse-s/p reduction by Uro-GYN on 6/14, as prolapse was interfering with surgery for fracture, pt. to F/U after discharge with Uro-GYN (Dr. Walter)  Post-op anemia/RUDDY/vaginal bleeding-s/p 3 units PRBCs, IV venofer, CBC monitored     Pt. was HDS and discharged to [facility] on 6/20/23. Today, patient denies dizziness, HA, SOB, cough, chest pain, dysuria, constipation, or appetite changes. Pt. is s/p Augmentin for R hip surgical incision infection. She reports resolution of swelling and pain of R hip. She reports improvement in R knee pain s/p cortisone injection on 7/20 and with use of compression sleeve during therapy. She denies any vaginal bleeding related to her vaginal prolapse. She is followed by Gyno. Staff report no other clinical concerns at this time.     ROS:    As above in HPI. Otherwise, all other systems have been reviewed and are negative for complaint.    Medications reviewed and verified in NH chart.     Patient Active Problem List   Diagnosis     Primary osteoarthritis involving multiple joints    Subclinical hypothyroidism    Venous insufficiency    Fibromuscular dysplasia of carotid artery (CMS/HCC)    Osteoporosis    Closed displaced comminuted fracture of shaft of right femur with routine healing    UTI (urinary tract infection)    Physical deconditioning    Anemia    Asthenia    At risk for falls    Sleep disorder    S/P right hip fracture    Vaginal prolapse    Anxiety        Past Medical History:   Diagnosis Date    Fibromuscular dysplasia of carotid artery (CMS/HCC)     Heart attack (CMS/HCC)     OAB (overactive bladder)     Osteoporosis     PMB (postmenopausal bleeding)     Skin cancer of face     Uterine prolapse        Past Surgical History:   Procedure Laterality Date    CARDIAC CATHETERIZATION Left     CT HEAD ANGIO W AND WO IV CONTRAST  2023    CT HEAD ANGIO W AND WO IV CONTRAST GEN CT    CT NECK ANGIO W AND WO IV CONTRAST  2023    CT NECK ANGIO W AND WO IV CONTRAST GEN CT    MR HEAD ANGIO W AND WO IV CONTRAST  2023    MR HEAD ANGIO W AND WO IV CONTRAST GEN MRI    MR NECK ANGIO W AND WO IV CONTRAST  2023    MR NECK ANGIO W AND WO IV CONTRAST GEN MRI       Family History   Problem Relation Name Age of Onset    Rheum arthritis Sister      Other (trigeminal neuralgia) Son      Anxiety disorder Son      Rheum arthritis Mother's Sister         Social History     Tobacco Use   Smoking Status Former    Types: Cigarettes    Quit date:     Years since quittin.7   Smokeless Tobacco Never       Social History     Substance and Sexual Activity   Alcohol Use Never       Social History     Substance and Sexual Activity   Drug Use Never       No Known Allergies     Vital Signs:   124/76-68-18-98.0-98% on RA     Physical Exam:  General: Sitting up in WC in NAD, alert   Head/Face: NCAT, symmetrical  Eyes: PERRLA, no injection, no discharge  ENT: Hearing not impaired, ears without scars or lesions, nasal mucosa and  turbinates pink, septum midline, lips pink and moist  Neck: Supple, symmetrical  Respiratory: CTA without adventitious sounds, even and nonlabored without use of accessory muscles, good air exchange  Cardio: RRR without murmur or gallops, normal S1S2, no edema, pedal pulses 3+/4 bilaterally  Chest/Breast: Symmetrical  GI: BS x 4, normoactive, non-distended, abd round and soft, no masses or tenderness  : No suprapubic tenderness or distention, + vaginal prolapse  MSK: Gait not assessed, joints with full ROM without pain or contractures  Skin: Skin warm and dry, no induration, surgical incision to R hip/R femur well-approximated and ANGEL  Neurologic: Cranial nerves II through XII intact, superficial touch and pain sensation intact  Psychiatric: Alert to person, place, and time, calm and cooperative     Results/Data:   7/12/23: Hgb 10.7, Hct 34.5  7/5/23: WBC 3.79, Hgb 10.1, Hct 31.5  6/28/23: Na+ 136, Lauri 8.4, Hgb 9.4, Hct 30.4  6/21/23: Na+ 135, Hgb 8.9, Hct 27.3     Assessment/Plan:  Right femur fracture 2/2 fall-s/p IM nailing on 6/14, WBAT to RLE, Tylenol prn for pain, completed 30-days of lovenox for DVT ppx, monitor CBC, F/U with ortho as scheduled  Physical deconditioning/fall-c/w PT/OT, safety and fall precautions   UTI-s/p IV rocephin, denies symptoms  Vaginal prolapse-s/p reduction by Uro-Gyn on 6/14, F/U with Dr. Son as scheduled   Anemia-post-operative/iron-deficiency/vaginal bleeding related, s/p 3 units PRBCs, IV venofer x 1, monitor CBC  Fibromuscular dysplasia of the vertebral artery-c/w ASA 81 mg daily, F/U with vascular after discharge  Osteoporosis-WB exercises as tolerated, c/w calcium + vit D  Venous insufficiency-JOB hose to BLE, elevate legs, monitor neurovascular status  R knee pain-XR showed arthritis, c/w Tylenol ES prn and Aspercreme prn, s/p cortisone injection on 7/20 by ortho, continue to encourage ROM, may use compression sleeve to R knee, F/U with ortho as scheduled  R hip  surgical wound infection-RESOLVED, s/p augmentin    Orders:  NNO    Code Status:   DNR-CCA

## 2023-09-10 ENCOUNTER — NURSING HOME VISIT (OUTPATIENT)
Dept: POST ACUTE CARE | Facility: EXTERNAL LOCATION | Age: 83
End: 2023-09-10
Payer: MEDICARE

## 2023-09-10 DIAGNOSIS — S72.351D CLOSED DISPLACED COMMINUTED FRACTURE OF SHAFT OF RIGHT FEMUR WITH ROUTINE HEALING: ICD-10-CM

## 2023-09-10 DIAGNOSIS — Z91.81 AT RISK FOR FALLS: ICD-10-CM

## 2023-09-10 DIAGNOSIS — N39.0 URINARY TRACT INFECTION WITHOUT HEMATURIA, SITE UNSPECIFIED: ICD-10-CM

## 2023-09-10 DIAGNOSIS — G47.9 SLEEP DISORDER: ICD-10-CM

## 2023-09-10 DIAGNOSIS — R53.1 ASTHENIA: Primary | ICD-10-CM

## 2023-09-10 DIAGNOSIS — F43.29 ADJUSTMENT DISORDER WITH OTHER SYMPTOM: ICD-10-CM

## 2023-09-10 PROCEDURE — 99308 SBSQ NF CARE LOW MDM 20: CPT | Performed by: INTERNAL MEDICINE

## 2023-09-10 NOTE — LETTER
Patient: Deisi Barkley  : 1940    Encounter Date: 09/10/2023    NAME OF THE PATIENT: Deisi Barkley    YOB: 1940    PLACE OF SERVICE:  [facility].    This is a subsequent visit.    HPI: Ms. Deisi Barkley is an 83-year-old female with history of weakness and deconditioning.  She has had a prior fracture to her right hip.  She continues to be a fall risk and requires supportive care.    PAST MEDICAL HISTORY:  As per nursing home list.  Right hip fracture, vaginal prolapse, weakness, frequent falls, UTI, adjustment disorder and insomnia.    CURRENT MEDICATIONS:  As per nursing home list.  Reviewed.    ALLERGIES:  As per nursing home list.  No known allergies.    SOCIAL HISTORY:  As per nursing home list. The patient denies use of alcohol or tobacco.    FAMILY HISTORY:  As per nursing home list.  Positive history of hypertension in this patient's family.    REVIEW OF SYSTEMS:  All 12 systems reviewed and pertaining covered in history and physical, the rest are negative.  The patient denies any fevers, chills or chest pain at this time.    PHYSICAL EXAMINATION  GENERAL: This is a well-developed, well-nourished female, lying in bed, appearing weak and lethargic.  VITAL SIGNS:  As recorded and reviewed from EMR.  Blood pressure 118/72.  Pulse 74.  Respirations 16.  Temperature 97.6.  EYES:  The patient's sclerae white.  The pupils were equal and round.  ENT:  The patient's external ears were normal and the otoscopic examination was negative.  NECK:  Supple.  There were no palpable masses.  Thyroid was not enlarged and there were no carotid bruits.  RESPIRATORY:  The patient had normal inspirations and expirations.  The breath sounds were equal bilaterally and clear to auscultation.  CARDIOVASCULAR:  The patient had S1 normal, split S2 without obvious rubs, clicks, or murmurs.  GASTROINTESTINAL:  There was no hepatosplenomegaly.  There were no palpable masses and  no inguinal nodes.  LYMPHATIC:  The patient had no axillary, groin, or lymphadenopathy.  MUSCULOSKELETAL:  The patient had normal gait.  The joints appeared to be normal without evidence of deformity.  Grossly the muscles had good range of motion and were without effusion.  EXTREMITIES:  The patient's hip wound is intact with no sign of infection.  NEUROLOGIC:  The patient had normal cranial nerves.  The reflexes, sensory, and motor examination were grossly within normal limits.  PSYCHIATRIC: The patient had normal judgment and insight. The patient was oriented to person, place, and time, and had no obvious mood defect including depression, anxiety, and/or agitation.     LAB WORK: Laboratory studies were reviewed from prior visit.    ASSESSMENT AND PLAN:  Weakness, on PT/OT.  Fall risk, on fall precaution.  Recent hip fracture, continue to monitor.  History of UTI, continue to monitor.  Adjustment disorder, on medication.  Sleep disorder, on melatonin.   Medication list reviewed and discussed with the family.  Hospital chart reviewed.       Electronically Signed By: Lucero Lacy MD   10/5/23 11:34 AM

## 2023-09-14 NOTE — PROGRESS NOTES
Chief Complaint:   SNF F/U  R femur fracture  Physical deconditioning/fall  UTI  Vaginal prolapse     HPI:   82 year-old female presenting to the ER on 6/13/23 w/ c/o R hip pain s/p fall. Pt. reported that she was walking in her home w/ her walker when she lost her balance and fell onto her R side. She was unable to ambulate s/p fall. She was able to call her neighbor who came to check on her and called 911. She was brought into the ER for evaluation. Work-up in ER showed UTI and R intertrochanteric femur fracture. She was admitted to the hospital for further evaluation and treatment. Hospital course:    R femur fracture 2/2 fall-ortho consult, underwent IM nailing on 6/14, pain mgmt, PT/OT eval, recommending SNF at discharge, SQ lovenox for DVT ppx --> ASA 81 mg BID x 30 days at discharge, WBAT, F/U with ortho after discharge  Fibromuscular dysplasia of the vertebral artery-ASA 81 mg daily, F/U with vascular after discharge  UTI-IV rocephin  Vaginal prolapse-s/p reduction by Uro-GYN on 6/14, as prolapse was interfering with surgery for fracture, pt. to F/U after discharge with Uro-GYN (Dr. Walter)  Post-op anemia/RUDDY/vaginal bleeding-s/p 3 units PRBCs, IV venofer, CBC monitored     Pt. was HDS and discharged to [facility] on 6/20/23. Today, patient denies dizziness, HA, SOB, cough, chest pain, dysuria, constipation, or appetite changes. Pt. is s/p Augmentin for R hip surgical incision infection. She reports resolution of swelling and pain of R hip. She reports improvement in R knee pain s/p cortisone injection on 7/20 and with use of compression sleeve during therapy. She denies any vaginal bleeding related to her vaginal prolapse. She is followed by Gyno. Staff report no other clinical concerns at this time.     ROS:    As above in HPI. Otherwise, all other systems have been reviewed and are negative for complaint.    Medications reviewed and verified in NH chart.     Patient Active Problem List   Diagnosis     Primary osteoarthritis involving multiple joints    Subclinical hypothyroidism    Venous insufficiency    Fibromuscular dysplasia of carotid artery (CMS/HCC)    Osteoporosis    Closed displaced comminuted fracture of shaft of right femur with routine healing    UTI (urinary tract infection)    Physical deconditioning    Anemia    Asthenia    At risk for falls    Sleep disorder    S/P right hip fracture    Vaginal prolapse    Anxiety        Past Medical History:   Diagnosis Date    Fibromuscular dysplasia of carotid artery (CMS/HCC)     Heart attack (CMS/HCC)     OAB (overactive bladder)     Osteoporosis     PMB (postmenopausal bleeding)     Skin cancer of face     Uterine prolapse        Past Surgical History:   Procedure Laterality Date    CARDIAC CATHETERIZATION Left     CT HEAD ANGIO W AND WO IV CONTRAST  2023    CT HEAD ANGIO W AND WO IV CONTRAST GEN CT    CT NECK ANGIO W AND WO IV CONTRAST  2023    CT NECK ANGIO W AND WO IV CONTRAST GEN CT    MR HEAD ANGIO W AND WO IV CONTRAST  2023    MR HEAD ANGIO W AND WO IV CONTRAST GEN MRI    MR NECK ANGIO W AND WO IV CONTRAST  2023    MR NECK ANGIO W AND WO IV CONTRAST GEN MRI       Family History   Problem Relation Name Age of Onset    Rheum arthritis Sister      Other (trigeminal neuralgia) Son      Anxiety disorder Son      Rheum arthritis Mother's Sister         Social History     Tobacco Use   Smoking Status Former    Types: Cigarettes    Quit date:     Years since quittin.7   Smokeless Tobacco Never       Social History     Substance and Sexual Activity   Alcohol Use Never       Social History     Substance and Sexual Activity   Drug Use Never       No Known Allergies     Vital Signs:   125/76-75-18-97.6-95% on RA     Physical Exam:  General: Sitting up in WC in NAD, alert   Head/Face: NCAT, symmetrical  Eyes: PERRLA, no injection, no discharge  ENT: Hearing not impaired, ears without scars or lesions, nasal mucosa and  turbinates pink, septum midline, lips pink and moist  Neck: Supple, symmetrical  Respiratory: CTA without adventitious sounds, even and nonlabored without use of accessory muscles, good air exchange  Cardio: RRR without murmur or gallops, normal S1S2, no edema, pedal pulses 3+/4 bilaterally  Chest/Breast: Symmetrical  GI: BS x 4, normoactive, non-distended, abd round and soft, no masses or tenderness  : No suprapubic tenderness or distention, + vaginal prolapse  MSK: Gait not assessed, joints with full ROM without pain or contractures  Skin: Skin warm and dry, no induration, surgical incision to R hip/R femur well-approximated and ANGEL  Neurologic: Cranial nerves II through XII intact, superficial touch and pain sensation intact  Psychiatric: Alert to person, place, and time, calm and cooperative     Results/Data:   7/12/23: Hgb 10.7, Hct 34.5  7/5/23: WBC 3.79, Hgb 10.1, Hct 31.5  6/28/23: Na+ 136, Lauri 8.4, Hgb 9.4, Hct 30.4  6/21/23: Na+ 135, Hgb 8.9, Hct 27.3     Assessment/Plan:  Right femur fracture 2/2 fall-s/p IM nailing on 6/14, WBAT to RLE, Tylenol prn for pain, completed 30-days of lovenox for DVT ppx, monitor CBC, F/U with ortho as scheduled  Physical deconditioning/fall-c/w PT/OT, safety and fall precautions   UTI-s/p IV rocephin, denies symptoms  Vaginal prolapse-s/p reduction by Uro-Gyn on 6/14, F/U with Dr. Son as scheduled   Anemia-post-operative/iron-deficiency/vaginal bleeding related, s/p 3 units PRBCs, IV venofer x 1, monitor CBC  Fibromuscular dysplasia of the vertebral artery-c/w ASA 81 mg daily, F/U with vascular after discharge  Osteoporosis-WB exercises as tolerated, c/w calcium + vit D  Venous insufficiency-JOB hose to BLE, elevate legs, monitor neurovascular status  R knee pain-XR showed arthritis, c/w Tylenol ES prn and Aspercreme prn, s/p cortisone injection on 7/20 by ortho, continue to encourage ROM, may use compression sleeve to R knee, F/U with ortho as scheduled  R hip  surgical wound infection-RESOLVED, s/p augmentin    Orders:  CBC w/ diff and BMP on 8/23    Code Status:   DNR-CCA

## 2023-09-15 ENCOUNTER — NURSING HOME VISIT (OUTPATIENT)
Dept: POST ACUTE CARE | Facility: EXTERNAL LOCATION | Age: 83
End: 2023-09-15
Payer: MEDICARE

## 2023-09-15 DIAGNOSIS — M81.0 AGE RELATED OSTEOPOROSIS, UNSPECIFIED PATHOLOGICAL FRACTURE PRESENCE: ICD-10-CM

## 2023-09-15 DIAGNOSIS — S72.351D CLOSED DISPLACED COMMINUTED FRACTURE OF SHAFT OF RIGHT FEMUR WITH ROUTINE HEALING: Primary | ICD-10-CM

## 2023-09-15 DIAGNOSIS — T81.49XA SURGICAL WOUND INFECTION: ICD-10-CM

## 2023-09-15 DIAGNOSIS — D64.9 ANEMIA, UNSPECIFIED TYPE: ICD-10-CM

## 2023-09-15 DIAGNOSIS — R53.81 PHYSICAL DECONDITIONING: ICD-10-CM

## 2023-09-15 DIAGNOSIS — I77.3 FIBROMUSCULAR DYSPLASIA OF CAROTID ARTERY (CMS-HCC): ICD-10-CM

## 2023-09-15 DIAGNOSIS — N81.10 VAGINAL PROLAPSE: ICD-10-CM

## 2023-09-15 PROCEDURE — 99309 SBSQ NF CARE MODERATE MDM 30: CPT | Performed by: NURSE PRACTITIONER

## 2023-09-15 NOTE — Clinical Note
Patient: Deisi Barkley  : 1940    Encounter Date: 09/15/2023    No notes on file    Electronically Signed By: BUBBA Joshi   23 10:38 AM

## 2023-09-16 ENCOUNTER — NURSING HOME VISIT (OUTPATIENT)
Dept: POST ACUTE CARE | Facility: EXTERNAL LOCATION | Age: 83
End: 2023-09-16
Payer: MEDICARE

## 2023-09-16 DIAGNOSIS — F43.20 ADJUSTMENT DISORDER, UNSPECIFIED TYPE: ICD-10-CM

## 2023-09-16 DIAGNOSIS — G47.00 INSOMNIA, UNSPECIFIED TYPE: ICD-10-CM

## 2023-09-16 DIAGNOSIS — Z87.81 S/P RIGHT HIP FRACTURE: Primary | ICD-10-CM

## 2023-09-16 DIAGNOSIS — Z91.81 AT RISK FOR FALLS: ICD-10-CM

## 2023-09-16 DIAGNOSIS — Z87.440 RECENT URINARY TRACT INFECTION: ICD-10-CM

## 2023-09-16 DIAGNOSIS — R53.1 WEAKNESS: ICD-10-CM

## 2023-09-16 PROCEDURE — 99308 SBSQ NF CARE LOW MDM 20: CPT | Performed by: INTERNAL MEDICINE

## 2023-09-16 NOTE — LETTER
Patient: Deisi Barkley  : 1940    Encounter Date: 2023    Subjective  Patient ID: Deisi Barkley is a 83 y.o. female who presents for Follow-up.    Ms. Deisi Barkley is an 83-year-old female with a recent history of hip fracture.  She has had a recent UTI as well.  She has multiple medical conditions and is unable to care for herself.  She requires supportive care.      Review of Systems   Constitutional:  Negative for chills and fever.   Cardiovascular:  Negative for chest pain.   All other systems reviewed and are negative.    Objective  /68   Pulse 78   Temp 36.7 °C (98 °F)   Resp 16     Physical Exam  Vitals reviewed.   Constitutional:       Comments: This is a well-developed, well-nourished female, lying in bed, appearing weak and lethargic.   HENT:      Right Ear: Tympanic membrane, ear canal and external ear normal.      Left Ear: Tympanic membrane, ear canal and external ear normal.   Eyes:      General: No scleral icterus.     Pupils: Pupils are equal, round, and reactive to light.   Neck:      Vascular: No carotid bruit.   Cardiovascular:      Heart sounds: Normal heart sounds, S1 normal and S2 normal. No murmur heard.     No friction rub.   Pulmonary:      Effort: Pulmonary effort is normal.      Breath sounds: Normal breath sounds and air entry.   Abdominal:      Palpations: There is no hepatomegaly, splenomegaly or mass.   Musculoskeletal:         General: No swelling or deformity. Normal range of motion.      Cervical back: Neck supple.      Right lower leg: No edema.      Left lower leg: No edema.   Lymphadenopathy:      Cervical: No cervical adenopathy.      Upper Body:      Right upper body: No axillary adenopathy.      Left upper body: No axillary adenopathy.      Lower Body: No right inguinal adenopathy. No left inguinal adenopathy.   Neurological:      Mental Status: She is oriented to person, place, and time.      Cranial Nerves: Cranial nerves 2-12 are intact. No cranial nerve  deficit.      Sensory: No sensory deficit.      Motor: Motor function is intact. No weakness.      Gait: Gait is intact.      Deep Tendon Reflexes: Reflexes normal.   Psychiatric:         Mood and Affect: Mood normal. Mood is not anxious or depressed. Affect is not angry.         Behavior: Behavior is not agitated.         Thought Content: Thought content normal.         Judgment: Judgment normal.     LAB WORK: Laboratory studies were reviewed.    Assessment/Plan  Problem List Items Addressed This Visit          Advance Directives and General Issues    At risk for falls       Musculoskeletal and Injuries    S/P right hip fracture - Primary     Other Visit Diagnoses       Weakness        Insomnia, unspecified type        Adjustment disorder, unspecified type        Recent urinary tract infection            1. Hip fracture, on pain control.  2. Weakness, on PT/OT.  3. Fall risk, on fall precaution.  4. Insomnia, on melatonin.  5. Adjustment disorder, on medication.  6. Recent UTI, continue to monitor.    Scribe Attestation  By signing my name below, INadege, Scribe   attest that this documentation has been prepared under the direction and in the presence of Lucero Lacy MD.       Electronically Signed By: Lucero Lacy MD   10/7/23  8:22 PM

## 2023-09-20 NOTE — PROGRESS NOTES
Chief Complaint:   SNF F/U  R femur fracture  Physical deconditioning/fall  UTI  Vaginal prolapse     HPI:   82 year-old female presenting to the ER on 6/13/23 w/ c/o R hip pain s/p fall. Pt. reported that she was walking in her home w/ her walker when she lost her balance and fell onto her R side. She was unable to ambulate s/p fall. She was able to call her neighbor who came to check on her and called 911. She was brought into the ER for evaluation. Work-up in ER showed UTI and R intertrochanteric femur fracture. She was admitted to the hospital for further evaluation and treatment. Hospital course:    R femur fracture 2/2 fall-ortho consult, underwent IM nailing on 6/14, pain mgmt, PT/OT eval, recommending SNF at discharge, SQ lovenox for DVT ppx --> ASA 81 mg BID x 30 days at discharge, WBAT, F/U with ortho after discharge  Fibromuscular dysplasia of the vertebral artery-ASA 81 mg daily, F/U with vascular after discharge  UTI-IV rocephin  Vaginal prolapse-s/p reduction by Uro-GYN on 6/14, as prolapse was interfering with surgery for fracture, pt. to F/U after discharge with Uro-GYN (Dr. Walter)  Post-op anemia/RUDDY/vaginal bleeding-s/p 3 units PRBCs, IV venofer, CBC monitored     Pt. was HDS and discharged to [facility] on 6/20/23. Today, patient denies dizziness, HA, SOB, cough, chest pain, dysuria, constipation, or appetite changes. Pt. is s/p Augmentin for R hip surgical incision infection. She reports resolution of swelling and pain of R hip. She reports improvement in R knee pain s/p cortisone injection on 7/20 and with use of compression sleeve during therapy. She denies any vaginal bleeding related to her vaginal prolapse. She is followed by Gyno. Staff report no other clinical concerns at this time.     ROS:    As above in HPI. Otherwise, all other systems have been reviewed and are negative for complaint.    Medications reviewed and verified in NH chart.     Patient Active Problem List   Diagnosis     Primary osteoarthritis involving multiple joints    Subclinical hypothyroidism    Venous insufficiency    Fibromuscular dysplasia of carotid artery (CMS/HCC)    Osteoporosis    Closed displaced comminuted fracture of shaft of right femur with routine healing    UTI (urinary tract infection)    Physical deconditioning    Anemia    Asthenia    At risk for falls    Sleep disorder    S/P right hip fracture    Vaginal prolapse    Anxiety        Past Medical History:   Diagnosis Date    Fibromuscular dysplasia of carotid artery (CMS/HCC)     Heart attack (CMS/HCC)     OAB (overactive bladder)     Osteoporosis     PMB (postmenopausal bleeding)     Skin cancer of face     Uterine prolapse        Past Surgical History:   Procedure Laterality Date    CARDIAC CATHETERIZATION Left     CT HEAD ANGIO W AND WO IV CONTRAST  2023    CT HEAD ANGIO W AND WO IV CONTRAST GEN CT    CT NECK ANGIO W AND WO IV CONTRAST  2023    CT NECK ANGIO W AND WO IV CONTRAST GEN CT    MR HEAD ANGIO W AND WO IV CONTRAST  2023    MR HEAD ANGIO W AND WO IV CONTRAST GEN MRI    MR NECK ANGIO W AND WO IV CONTRAST  2023    MR NECK ANGIO W AND WO IV CONTRAST GEN MRI       Family History   Problem Relation Name Age of Onset    Rheum arthritis Sister      Other (trigeminal neuralgia) Son      Anxiety disorder Son      Rheum arthritis Mother's Sister         Social History     Tobacco Use   Smoking Status Former    Types: Cigarettes    Quit date:     Years since quittin.7   Smokeless Tobacco Never       Social History     Substance and Sexual Activity   Alcohol Use Never       Social History     Substance and Sexual Activity   Drug Use Never       No Known Allergies     Vital Signs:   120/72-72-18-97.8-97% on RA     Physical Exam:  General: Sitting up in WC in NAD, alert   Head/Face: NCAT, symmetrical  Eyes: PERRLA, no injection, no discharge  ENT: Hearing not impaired, ears without scars or lesions, nasal mucosa and  turbinates pink, septum midline, lips pink and moist  Neck: Supple, symmetrical  Respiratory: CTA without adventitious sounds, even and nonlabored without use of accessory muscles, good air exchange  Cardio: RRR without murmur or gallops, normal S1S2, no edema, pedal pulses 3+/4 bilaterally  Chest/Breast: Symmetrical  GI: BS x 4, normoactive, non-distended, abd round and soft, no masses or tenderness  : No suprapubic tenderness or distention, + vaginal prolapse  MSK: Gait not assessed, joints with full ROM without pain or contractures  Skin: Skin warm and dry, no induration, healed surgical incision to R hip/R femur  Neurologic: Cranial nerves II through XII intact, superficial touch and pain sensation intact  Psychiatric: Alert to person, place, and time, calm and cooperative     Results/Data:   8/23/23: Glu 70, WBC 3.39, Hgb 11.9  7/12/23: Hgb 10.7, Hct 34.5  7/5/23: WBC 3.79, Hgb 10.1, Hct 31.5  6/28/23: Na+ 136, Lauri 8.4, Hgb 9.4, Hct 30.4  6/21/23: Na+ 135, Hgb 8.9, Hct 27.3     Assessment/Plan:  Right femur fracture 2/2 fall-s/p IM nailing on 6/14, WBAT to RLE, Tylenol prn for pain, completed 30-days of lovenox for DVT ppx, monitor CBC, F/U with ortho as scheduled (9/7)  Physical deconditioning/fall-c/w PT/OT, safety and fall precautions   UTI-s/p IV rocephin, denies symptoms  Vaginal prolapse-s/p reduction by Uro-Gyn on 6/14, F/U with Dr. Son as scheduled   Anemia-post-operative/iron-deficiency/vaginal bleeding related, s/p 3 units PRBCs, IV venofer x 1, monitor CBC  Fibromuscular dysplasia of the vertebral artery-c/w ASA 81 mg daily, F/U with vascular after discharge  Osteoporosis-WB exercises as tolerated, c/w calcium + vit D  Venous insufficiency-elevate legs, monitor neurovascular status  R knee pain-XR showed arthritis, c/w Tylenol ES prn and Aspercreme prn, s/p cortisone injection on 7/20 by ortho, continue to encourage ROM, may use compression sleeve to R knee, F/U with ortho as scheduled  R hip  surgical wound infection-RESOLVED, s/p augmentin    Orders:  NNO    Code Status:   DNR-CCA

## 2023-09-24 VITALS
RESPIRATION RATE: 16 BRPM | DIASTOLIC BLOOD PRESSURE: 68 MMHG | TEMPERATURE: 98 F | HEART RATE: 78 BPM | SYSTOLIC BLOOD PRESSURE: 124 MMHG

## 2023-09-24 ASSESSMENT — ENCOUNTER SYMPTOMS
FEVER: 0
CHILLS: 0

## 2023-09-25 NOTE — PROGRESS NOTES
Subjective   Patient ID: Deisi Barkley is a 83 y.o. female who presents for Follow-up.    Ms. Deisi Barkley is an 83-year-old female with a recent history of hip fracture.  She has had a recent UTI as well.  She has multiple medical conditions and is unable to care for herself.  She requires supportive care.      Review of Systems   Constitutional:  Negative for chills and fever.   Cardiovascular:  Negative for chest pain.   All other systems reviewed and are negative.    Objective   /68   Pulse 78   Temp 36.7 °C (98 °F)   Resp 16     Physical Exam  Vitals reviewed.   Constitutional:       Comments: This is a well-developed, well-nourished female, lying in bed, appearing weak and lethargic.   HENT:      Right Ear: Tympanic membrane, ear canal and external ear normal.      Left Ear: Tympanic membrane, ear canal and external ear normal.   Eyes:      General: No scleral icterus.     Pupils: Pupils are equal, round, and reactive to light.   Neck:      Vascular: No carotid bruit.   Cardiovascular:      Heart sounds: Normal heart sounds, S1 normal and S2 normal. No murmur heard.     No friction rub.   Pulmonary:      Effort: Pulmonary effort is normal.      Breath sounds: Normal breath sounds and air entry.   Abdominal:      Palpations: There is no hepatomegaly, splenomegaly or mass.   Musculoskeletal:         General: No swelling or deformity. Normal range of motion.      Cervical back: Neck supple.      Right lower leg: No edema.      Left lower leg: No edema.   Lymphadenopathy:      Cervical: No cervical adenopathy.      Upper Body:      Right upper body: No axillary adenopathy.      Left upper body: No axillary adenopathy.      Lower Body: No right inguinal adenopathy. No left inguinal adenopathy.   Neurological:      Mental Status: She is oriented to person, place, and time.      Cranial Nerves: Cranial nerves 2-12 are intact. No cranial nerve deficit.      Sensory: No sensory deficit.      Motor: Motor  function is intact. No weakness.      Gait: Gait is intact.      Deep Tendon Reflexes: Reflexes normal.   Psychiatric:         Mood and Affect: Mood normal. Mood is not anxious or depressed. Affect is not angry.         Behavior: Behavior is not agitated.         Thought Content: Thought content normal.         Judgment: Judgment normal.     LAB WORK: Laboratory studies were reviewed.    Assessment/Plan   Problem List Items Addressed This Visit          Advance Directives and General Issues    At risk for falls       Musculoskeletal and Injuries    S/P right hip fracture - Primary     Other Visit Diagnoses       Weakness        Insomnia, unspecified type        Adjustment disorder, unspecified type        Recent urinary tract infection            1. Hip fracture, on pain control.  2. Weakness, on PT/OT.  3. Fall risk, on fall precaution.  4. Insomnia, on melatonin.  5. Adjustment disorder, on medication.  6. Recent UTI, continue to monitor.    Scribe Attestation  By signing my name below, Nadege TALAMANTES Scribe   attest that this documentation has been prepared under the direction and in the presence of Lucero Lacy MD.     All medical record entries made by the scribe were personally dictated by me I have reviewed the chart and agree the record accurately reflects my personal performance of his history physical examination and management

## 2023-09-29 NOTE — PROGRESS NOTES
Chief Complaint:   SNF F/U  R femur fracture  Physical deconditioning/fall  UTI  Vaginal prolapse     HPI:   82 year-old female presenting to the ER on 6/13/23 w/ c/o R hip pain s/p fall. Pt. reported that she was walking in her home w/ her walker when she lost her balance and fell onto her R side. She was unable to ambulate s/p fall. She was able to call her neighbor who came to check on her and called 911. She was brought into the ER for evaluation. Work-up in ER showed UTI and R intertrochanteric femur fracture. She was admitted to the hospital for further evaluation and treatment. Hospital course:    R femur fracture 2/2 fall-ortho consult, underwent IM nailing on 6/14, pain mgmt, PT/OT eval, recommending SNF at discharge, SQ lovenox for DVT ppx --> ASA 81 mg BID x 30 days at discharge, WBAT, F/U with ortho after discharge  Fibromuscular dysplasia of the vertebral artery-ASA 81 mg daily, F/U with vascular after discharge  UTI-IV rocephin  Vaginal prolapse-s/p reduction by Uro-GYN on 6/14, as prolapse was interfering with surgery for fracture, pt. to F/U after discharge with Uro-GYN (Dr. Walter)  Post-op anemia/RUDDY/vaginal bleeding-s/p 3 units PRBCs, IV venofer, CBC monitored     Pt. was HDS and discharged to [facility] on 6/20/23. Today, patient denies dizziness, HA, SOB, cough, chest pain, dysuria, constipation, or appetite changes. Pt. is s/p Augmentin for R hip surgical incision infection. She reports resolution of swelling and pain of R hip. She reports improvement in R knee pain s/p cortisone injection on 7/20 and with use of compression sleeve during therapy. She denies any vaginal bleeding related to her vaginal prolapse. She is being followed by Gyno. Staff report no other clinical concerns at this time.     ROS:    As above in HPI. Otherwise, all other systems have been reviewed and are negative for complaint.    Medications reviewed and verified in NH chart.     Patient Active Problem List    Diagnosis    Primary osteoarthritis involving multiple joints    Subclinical hypothyroidism    Venous insufficiency    Fibromuscular dysplasia of carotid artery (CMS/HCC)    Osteoporosis    Closed displaced comminuted fracture of shaft of right femur with routine healing    UTI (urinary tract infection)    Physical deconditioning    Anemia    Asthenia    At risk for falls    Sleep disorder    S/P right hip fracture    Vaginal prolapse    Anxiety        Past Medical History:   Diagnosis Date    Adjustment disorder     Fall     Fibromuscular dysplasia of carotid artery (CMS/HCC)     Heart attack (CMS/HCC) 2014    Hip fracture (CMS/HCC)     Insomnia     OAB (overactive bladder)     Osteoporosis     PMB (postmenopausal bleeding)     Skin cancer of face     Urinary tract infection     Uterine prolapse     Weakness        Past Surgical History:   Procedure Laterality Date    CARDIAC CATHETERIZATION Left     CT HEAD ANGIO W AND WO IV CONTRAST  2023    CT HEAD ANGIO W AND WO IV CONTRAST GEN CT    CT NECK ANGIO W AND WO IV CONTRAST  2023    CT NECK ANGIO W AND WO IV CONTRAST GEN CT    MR HEAD ANGIO W AND WO IV CONTRAST  2023    MR HEAD ANGIO W AND WO IV CONTRAST GEN MRI    MR NECK ANGIO W AND WO IV CONTRAST  2023    MR NECK ANGIO W AND WO IV CONTRAST GEN MRI       Family History   Problem Relation Name Age of Onset    Rheum arthritis Sister      Other (trigeminal neuralgia) Son      Anxiety disorder Son      Rheum arthritis Mother's Sister      Hypertension Other         Social History     Tobacco Use   Smoking Status Former    Types: Cigarettes    Quit date:     Years since quittin.7   Smokeless Tobacco Never       Social History     Substance and Sexual Activity   Alcohol Use Never       Social History     Substance and Sexual Activity   Drug Use Never       No Known Allergies     Vital Signs:   128/74-70-18-97.8-97% on RA     Physical Exam:  General: Sitting up in WC in NAD, alert    Head/Face: NCAT, symmetrical  Eyes: PERRLA, no injection, no discharge  ENT: Hearing not impaired, ears without scars or lesions, nasal mucosa and turbinates pink, septum midline, lips pink and moist  Neck: Supple, symmetrical  Respiratory: CTA without adventitious sounds, even and nonlabored without use of accessory muscles, good air exchange  Cardio: RRR without murmur or gallops, normal S1S2, no edema, pedal pulses 3+/4 bilaterally  Chest/Breast: Symmetrical  GI: BS x 4, normoactive, non-distended, abd round and soft, no masses or tenderness  : No suprapubic tenderness or distention, + vaginal prolapse  MSK: Gait not assessed, joints with full ROM without pain or contractures  Skin: Skin warm and dry, no induration, healed surgical incision to R hip/R femur  Neurologic: Cranial nerves II through XII intact, superficial touch and pain sensation intact  Psychiatric: Alert to person, place, and time, calm and cooperative     Results/Data:   8/23/23: Glu 70, WBC 3.39, Hgb 11.9  7/12/23: Hgb 10.7, Hct 34.5  7/5/23: WBC 3.79, Hgb 10.1, Hct 31.5  6/28/23: Na+ 136, Lauri 8.4, Hgb 9.4, Hct 30.4  6/21/23: Na+ 135, Hgb 8.9, Hct 27.3     Assessment/Plan:  Right femur fracture 2/2 fall-s/p IM nailing on 6/14, WBAT to RLE, Tylenol prn for pain, completed 30-days of lovenox for DVT ppx, monitor CBC, F/U with ortho as needed (last seen 9/7 with NNO)  Physical deconditioning/fall-c/w PT/OT, safety and fall precautions   UTI-s/p IV rocephin, denies symptoms  Vaginal prolapse-s/p reduction by Uro-Gyn on 6/14, F/U with Dr. Son as scheduled   Anemia-post-operative/iron-deficiency/vaginal bleeding related, s/p 3 units PRBCs, IV venofer x 1, monitor CBC  Fibromuscular dysplasia of the vertebral artery-c/w ASA 81 mg daily, F/U with vascular after discharge  Osteoporosis-WB exercises as tolerated, c/w calcium + vit D  Venous insufficiency-elevate legs, monitor neurovascular status  R knee pain-XR showed arthritis, c/w Tylenol ES  prn and Aspercreme prn, s/p cortisone injection on 7/20 by ortho, continue to encourage ROM, may use compression sleeve to R knee, F/U with ortho as scheduled  R hip surgical wound infection-RESOLVED, s/p augmentin    Orders:  NNO    Code Status:   DNR-CCA

## 2023-09-29 NOTE — PROGRESS NOTES
Chief Complaint:   SNF F/U  R femur fracture  Physical deconditioning/fall  UTI  Vaginal prolapse     HPI:   82 year-old female presenting to the ER on 6/13/23 w/ c/o R hip pain s/p fall. Pt. reported that she was walking in her home w/ her walker when she lost her balance and fell onto her R side. She was unable to ambulate s/p fall. She was able to call her neighbor who came to check on her and called 911. She was brought into the ER for evaluation. Work-up in ER showed UTI and R intertrochanteric femur fracture. She was admitted to the hospital for further evaluation and treatment. Hospital course:    R femur fracture 2/2 fall-ortho consult, underwent IM nailing on 6/14, pain mgmt, PT/OT eval, recommending SNF at discharge, SQ lovenox for DVT ppx --> ASA 81 mg BID x 30 days at discharge, WBAT, F/U with ortho after discharge  Fibromuscular dysplasia of the vertebral artery-ASA 81 mg daily, F/U with vascular after discharge  UTI-IV rocephin  Vaginal prolapse-s/p reduction by Uro-GYN on 6/14, as prolapse was interfering with surgery for fracture, pt. to F/U after discharge with Uro-GYN (Dr. Walter)  Post-op anemia/RUDDY/vaginal bleeding-s/p 3 units PRBCs, IV venofer, CBC monitored     Pt. was HDS and discharged to [facility] on 6/20/23. Today, patient denies dizziness, HA, SOB, cough, chest pain, dysuria, constipation, or appetite changes. Pt. is s/p Augmentin for R hip surgical incision infection. She reports resolution of swelling and pain of R hip. She reports improvement in R knee pain s/p cortisone injection on 7/20 and with use of compression sleeve during therapy. She denies any vaginal bleeding related to her vaginal prolapse. She is being followed by Gyno. Staff report no other clinical concerns at this time.     ROS:    As above in HPI. Otherwise, all other systems have been reviewed and are negative for complaint.    Medications reviewed and verified in NH chart.     Patient Active Problem List    Diagnosis    Primary osteoarthritis involving multiple joints    Subclinical hypothyroidism    Venous insufficiency    Fibromuscular dysplasia of carotid artery (CMS/HCC)    Osteoporosis    Closed displaced comminuted fracture of shaft of right femur with routine healing    UTI (urinary tract infection)    Physical deconditioning    Anemia    Asthenia    At risk for falls    Sleep disorder    S/P right hip fracture    Vaginal prolapse    Anxiety        Past Medical History:   Diagnosis Date    Adjustment disorder     Fall     Fibromuscular dysplasia of carotid artery (CMS/HCC)     Heart attack (CMS/HCC) 2014    Hip fracture (CMS/HCC)     Insomnia     OAB (overactive bladder)     Osteoporosis     PMB (postmenopausal bleeding)     Skin cancer of face     Urinary tract infection     Uterine prolapse     Weakness        Past Surgical History:   Procedure Laterality Date    CARDIAC CATHETERIZATION Left     CT HEAD ANGIO W AND WO IV CONTRAST  2023    CT HEAD ANGIO W AND WO IV CONTRAST GEN CT    CT NECK ANGIO W AND WO IV CONTRAST  2023    CT NECK ANGIO W AND WO IV CONTRAST GEN CT    MR HEAD ANGIO W AND WO IV CONTRAST  2023    MR HEAD ANGIO W AND WO IV CONTRAST GEN MRI    MR NECK ANGIO W AND WO IV CONTRAST  2023    MR NECK ANGIO W AND WO IV CONTRAST GEN MRI       Family History   Problem Relation Name Age of Onset    Rheum arthritis Sister      Other (trigeminal neuralgia) Son      Anxiety disorder Son      Rheum arthritis Mother's Sister      Hypertension Other         Social History     Tobacco Use   Smoking Status Former    Types: Cigarettes    Quit date:     Years since quittin.7   Smokeless Tobacco Never       Social History     Substance and Sexual Activity   Alcohol Use Never       Social History     Substance and Sexual Activity   Drug Use Never       No Known Allergies     Vital Signs:   116/72-90-18-98.6-98% on RA     Physical Exam:  General: Sitting up in WC in NAD, alert    Head/Face: NCAT, symmetrical  Eyes: PERRLA, no injection, no discharge  ENT: Hearing not impaired, ears without scars or lesions, nasal mucosa and turbinates pink, septum midline, lips pink and moist  Neck: Supple, symmetrical  Respiratory: CTA without adventitious sounds, even and nonlabored without use of accessory muscles, good air exchange  Cardio: RRR without murmur or gallops, normal S1S2, no edema, pedal pulses 3+/4 bilaterally  Chest/Breast: Symmetrical  GI: BS x 4, normoactive, non-distended, abd round and soft, no masses or tenderness  : No suprapubic tenderness or distention, + vaginal prolapse  MSK: Gait not assessed, joints with full ROM without pain or contractures  Skin: Skin warm and dry, no induration, healed surgical incision to R hip/R femur  Neurologic: Cranial nerves II through XII intact, superficial touch and pain sensation intact  Psychiatric: Alert to person, place, and time, calm and cooperative     Results/Data:   8/23/23: Glu 70, WBC 3.39, Hgb 11.9  7/12/23: Hgb 10.7, Hct 34.5  7/5/23: WBC 3.79, Hgb 10.1, Hct 31.5  6/28/23: Na+ 136, Lauri 8.4, Hgb 9.4, Hct 30.4  6/21/23: Na+ 135, Hgb 8.9, Hct 27.3     Assessment/Plan:  Right femur fracture 2/2 fall-s/p IM nailing on 6/14, WBAT to RLE, Tylenol prn for pain, completed 30-days of lovenox for DVT ppx, monitor CBC, F/U with ortho as needed (last seen 9/7 with NNO)  Physical deconditioning/fall-c/w PT/OT, safety and fall precautions   UTI-s/p IV rocephin, denies symptoms  Vaginal prolapse-s/p reduction by Uro-Gyn on 6/14, US scheduled for 9/21, F/U with Dr. Son as scheduled   Anemia-post-operative/iron-deficiency/vaginal bleeding related, s/p 3 units PRBCs, IV venofer x 1, monitor CBC  Fibromuscular dysplasia of the vertebral artery-c/w ASA 81 mg daily, F/U with vascular after discharge  Osteoporosis-WB exercises as tolerated, c/w calcium + vit D  Venous insufficiency-elevate legs, monitor neurovascular status  R knee pain-XR showed  arthritis, c/w Tylenol ES prn and Aspercreme prn, s/p cortisone injection on 7/20 by ortho, continue to encourage ROM, may use compression sleeve to R knee, F/U with ortho as scheduled  R hip surgical wound infection-RESOLVED, s/p augmentin    Orders:  NNO    Code Status:   DNR-CCA

## 2023-10-05 PROBLEM — G47.09 OTHER INSOMNIA: Status: ACTIVE | Noted: 2023-10-05

## 2023-10-05 PROBLEM — F43.20 ADJUSTMENT DISORDER: Status: ACTIVE | Noted: 2023-10-05

## 2023-10-05 NOTE — PROGRESS NOTES
NAME OF THE PATIENT: Deisi Barkley    YOB: 1940    PLACE OF SERVICE:  [facility] [place].    This is a subsequent visit.    HPI:  Ms. Deisi Barkley is an 83-year-old female with history of right femur fracture.  She suffers from weakness and deconditioning.  She has a difficult time ambulating.  She requires supportive care.    PAST MEDICAL HISTORY:  As per nursing home list.  Right hip fracture, vaginal prolapse, weakness, frequent falls, UTI, adjustment disorder and insomnia.    CURRENT MEDICATIONS:  As per nursing home list.  Reviewed.    ALLERGIES:  As per nursing home list.  No known allergies.    SOCIAL HISTORY:  As per nursing home list. The patient denies use of alcohol or tobacco.    FAMILY HISTORY:  As per nursing home list.  Positive history of hypertension in this patient's family.    REVIEW OF SYSTEMS:  All 12 systems reviewed and pertaining covered in history and physical, the rest are negative.  The patient denies any fevers, chills or chest pain at this time.    PHYSICAL EXAMINATION  GENERAL: This is a well-developed, well-nourished female, lying in bed, appearing weak and lethargic.  VITAL SIGNS:  As recorded and reviewed from EMR.  Blood pressure 118/68.  Pulse 72.  Respirations 16.  Temperature 97.7.  EYES:  The patient's sclerae white.  The pupils were equal and round.  ENT:  The patient's external ears were normal and the otoscopic examination was negative.  NECK:  Supple.  There were no palpable masses.  Thyroid was not enlarged and there were no carotid bruits.  RESPIRATORY:  The patient had normal inspirations and expirations.  The breath sounds were equal bilaterally and clear to auscultation.  CARDIOVASCULAR:  The patient had S1 normal, split S2 without obvious rubs, clicks, or murmurs.  GASTROINTESTINAL:  There was no hepatosplenomegaly.  There were no palpable masses and no inguinal nodes.  LYMPHATIC:  The patient had no axillary, groin, or  lymphadenopathy.  MUSCULOSKELETAL:  The patient had normal gait.  The joints appeared to be normal without evidence of deformity.  Grossly the muscles had good range of motion and were without effusion.  EXTREMITIES:  The patient's right hip wound is intact with no sign of infection.  NEUROLOGIC:  The patient had normal cranial nerves.  The reflexes, sensory, and motor examination were grossly within normal limits.  PSYCHIATRIC: The patient had normal judgment and insight.  The patient was oriented to person, place, and time, and had no obvious mood defect including depression, anxiety, and/or agitation.    LAB WORK: Laboratory studies were reviewed from prior visit.    ASSESSMENT AND PLAN:  Right hip fracture, on pain control.  Weakness, on PT/OT.  Fall risk, on fall precautions.  Adjustment disorder, on medication.  Insomnia, on melatonin  Recent UTI, continue to monitor.  Medication list reviewed and discussed with the family.  Hospital chart reviewed.

## 2023-10-05 NOTE — PROGRESS NOTES
NAME OF THE PATIENT: Deisi Barkley    YOB: 1940    PLACE OF SERVICE:  [facility] [place]    This is a subsequent visit.    HPI:  Ms. Deisi Barkley is an 82-year-old female with recent history of hip fracture.  She continues to be in a fall risk.  She requires supportive care.    PAST MEDICAL HISTORY:  As per nursing home list.  Right hip fracture, vaginal prolapse, weakness, frequent falls, UTI, adjustment disorder and insomnia.    CURRENT MEDICATIONS:  As per nursing home list.  Reviewed.    ALLERGIES:  As per nursing home list.  No known allergies.    SOCIAL HISTORY:  As per nursing home list.  The patient denies use of alcohol or tobacco.    FAMILY HISTORY:  As per nursing home list.  Positive history of hypertension in this patient's family.    REVIEW OF SYSTEMS:  All 12 systems reviewed and pertaining covered in history and physical, the rest are negative.  The patient denies any fevers, chills or chest pain at this time.    PHYSICAL EXAMINATION  GENERAL:  This is a well-developed, elderly female, lying in bed, appearing weak and lethargic.  VITAL SIGNS:  As recorded and reviewed from EMR.  Blood pressure 120/76.  Pulse 74.  Respirations 16.  Temperature 97.9.  EYES:  The patient's sclerae white.  The pupils were equal and round.  ENT:  The patient's external ears were normal and the otoscopic examination was negative.  NECK:  Supple.  There were no palpable masses.  Thyroid was not enlarged and there were no carotid bruits.  RESPIRATORY:  The patient had normal inspirations and expirations.  The breath sounds were equal bilaterally and clear to auscultation.  CARDIOVASCULAR:  The patient had S1 normal, split S2 without obvious rubs, clicks, or murmurs.  GASTROINTESTINAL:  There was no hepatosplenomegaly.  There were no palpable masses and no inguinal nodes.  LYMPHATIC:  The patient had no axillary, groin, or lymphadenopathy.  MUSCULOSKELETAL:  The patient had  normal gait.  The joints appeared to be normal without evidence of deformity.  Grossly the muscles had good range of motion and were without effusion.  EXTREMITIES:  There was no evidence of peripheral edema.  There is tenderness over the patient's right hip.  NEUROLOGIC:  The patient had normal cranial nerves.  The reflexes, sensory, and motor examination were grossly within normal limits.  PSYCHIATRIC: The patient had normal judgment and insight.  The patient was oriented to person, place, and time, and had no obvious mood defect including depression, anxiety, and/or agitation.    LAB WORK:  Laboratory studies were reviewed from prior visit.    ASSESSMENT AND PLAN:  Right hip fracture, on pain control.  Recent UTI.  Continue to monitor.  Weakness, on PT/OT.  Fall risk, on fall precautions.  Anxiety, on medication.  Insomnia, on melatonin.  Medication list reviewed and discussed with the family.  Hospital chart reviewed.

## 2023-10-05 NOTE — PROGRESS NOTES
NAME OF THE PATIENT: Deisi Barkley    YOB: 1940    PLACE OF SERVICE:  [facility] [place]    This is a subsequent visit.    HPI:  Ms. Deisi Barkley is an 83-year-old female who is status post right hip fracture.  She continues to have weakness and deconditioning, and continues to be a fall risk.  She requires supportive care.    PAST MEDICAL HISTORY:  As per nursing home list.  Right hip fracture, vaginal prolapse, weakness, frequent falls, UTI, adjustment disorder and insomnia.    CURRENT MEDICATIONS:  As per nursing home list.  Reviewed.    ALLERGIES:  As per nursing home list.  No known allergies.    SOCIAL HISTORY:  As per nursing home list. The patient denies use of alcohol or tobacco.    FAMILY HISTORY:  As per nursing home list.  Positive history of hypertension in this patient's family.    REVIEW OF SYSTEMS:  All 12 systems reviewed and pertaining covered in history and physical, the rest are negative.  The patient denies any fevers, chills or chest pain at this time.    PHYSICAL EXAMINATION  GENERAL: This is a well-developed and well-nourished female, lying in bed, appearing weak and lethargic.  VITAL SIGNS:  As recorded and reviewed from EMR.  Blood pressure 118/72.  Pulse 74.  Respirations 16.  Temperature 97.7.  EYES:  The patient's sclerae white.  The pupils were equal and round.  ENT:  The patient's external ears were normal and the otoscopic examination was negative.  NECK:  Supple.  There were no palpable masses.  Thyroid was not enlarged and there were no carotid bruits.  RESPIRATORY:  The patient had normal inspirations and expirations.  The breath sounds were equal bilaterally and clear to auscultation.  CARDIOVASCULAR:  The patient had S1 normal, split S2 without obvious rubs, clicks, or murmurs.  GASTROINTESTINAL:  There was no hepatosplenomegaly.  There were no palpable masses and no inguinal nodes.  LYMPHATIC:  The patient had no axillary, groin,  or lymphadenopathy.  MUSCULOSKELETAL:  The patient had normal gait.  The joints appeared to be normal without evidence of deformity.  Grossly the muscles had good range of motion and were without effusion.  EXTREMITIES:  There was no evidence of peripheral edema.  NEUROLOGIC:  The patient had normal cranial nerves.  The reflexes, sensory, and motor examination were grossly within normal limits.  PSYCHIATRIC: The patient had normal judgment and insight.  The patient was oriented to person, place, and time, and had no obvious mood defect including depression, anxiety, and/or agitation.    LAB WORK: Laboratory studies were reviewed from prior visit.    ASSESSMENT AND PLAN:  Status post hip fracture, continue to monitor.  Weakness, on PT/OT.  Fall risk, on fall precaution.  Adjustment disorder, on medication.  Insomnia, on melatonin.  History of UTI, continue to monitor.  Medication list reviewed and discussed with the family.  Hospital chart reviewed.

## 2023-10-05 NOTE — PROGRESS NOTES
NAME OF THE PATIENT: Deisi Barkley    YOB: 1940    PLACE OF SERVICE:  [facility] [place].    This is a subsequent visit.    HPI:  Ms. Deisi Barkley is an 82-year-old female with history of right femur fracture.  She has undergone surgical repair.  She has been attending physical and occupational therapy in order to regain strength and function.    PAST MEDICAL HISTORY:  As per nursing home list.  Right hip fracture, vaginal prolapse, weakness, frequent falls, UTI, adjustment disorder and insomnia.    CURRENT MEDICATIONS:  As per nursing home list.  Reviewed.    ALLERGIES:  As per nursing home list.  No known allergies.    SOCIAL HISTORY:  As per nursing home list. The patient denies use of alcohol or tobacco.    FAMILY HISTORY:  As per nursing home list.  Positive history of hypertension in this patient's family.    REVIEW OF SYSTEMS:  All 12 systems reviewed and pertaining covered in history and physical, the rest are negative.  The patient denies any fevers, chills or chest pain at this time.    PHYSICAL EXAMINATION  GENERAL: This is a well-developed, elderly female, lying in bed, appearing weak and lethargic.  VITAL SIGNS:  As recorded and reviewed from EMR.  Blood pressure 118/76.  Pulse 74.  Respirations 16.  Temperature 97.5.  EYES:  The patient's sclerae white.  The pupils were equal and round.  ENT:  The patient's external ears were normal and the otoscopic examination was negative.  NECK:  Supple.  There were no palpable masses.  Thyroid was not enlarged and there were no carotid bruits.  RESPIRATORY:  The patient had normal inspirations and expirations.  The breath sounds were equal bilaterally and clear to auscultation.  CARDIOVASCULAR:  The patient had S1 normal, split S2 without obvious rubs, clicks, or murmurs.  GASTROINTESTINAL:  There was no hepatosplenomegaly.  There were no palpable masses and no inguinal nodes.  LYMPHATIC:  The patient had no  axillary, groin, or lymphadenopathy.  MUSCULOSKELETAL:  The patient had normal gait.  The joints appeared to be normal without evidence of deformity.  Grossly the muscles had good range of motion and were without effusion.  EXTREMITIES:  The patient's right leg wound is intact with no sign of infection.  NEUROLOGIC:  The patient had normal cranial nerves.  The reflexes, sensory, and motor examination were grossly within normal limits.  PSYCHIATRIC: The patient had normal judgment and insight. The patient was oriented to person, place, and time, and had no obvious mood defect including depression, anxiety, and/or agitation.    LAB WORK: Laboratory studies were reviewed from prior visit.    ASSESSMENT AND PLAN:  Right femur fracture, on pain control.  Weakness, on PT and OT.  Fall risk, on fall precaution.  History of UTI, continue to monitor.  Adjustment disorder, on medication.  Insomnia, on melatonin.  Medication list reviewed and discussed with the family.  Hospital chart reviewed.

## 2023-10-05 NOTE — PROGRESS NOTES
NAME OF THE PATIENT: Deisi Barkley    YOB: 1940    PLACE OF SERVICE:  [facility]    This is a subsequent visit.    HPI:  Ms. Deisi Barkley is an 82-year-old female with history of right femur fracture.  She has undergone surgical repair.  She attends physical and occupational therapy in order to gain strength and function.    PAST MEDICAL HISTORY:  As per nursing home list.  Vaginal prolapse, UTI, fall risk, weakness, and right femur fracture.    CURRENT MEDICATIONS:  As per nursing home list.  Reviewed.    ALLERGIES:  As per nursing home list.  No known allergies.    SOCIAL HISTORY:  As per nursing home list.  The patient denies use of alcohol or tobacco.    FAMILY HISTORY:  As per nursing home list.  Positive history of hypertension in this patient's family.    REVIEW OF SYSTEMS:  All 12 systems reviewed and pertaining covered in history and physical, the rest are negative.  The patient denies any fevers, chills or chest pain at this time.    PHYSICAL EXAMINATION  GENERAL:  This is a well-developed, well-nourished female, lying in bed, appearing weak and lethargic.  VITAL SIGNS:  As recorded and reviewed from EMR.  Blood pressure 118/70.  Pulse 74.  Respirations 16.  Temperature 97.9.  EYES:  The patient's sclerae white.  The pupils were equal and round.  ENT:  The patient's external ears were normal and the otoscopic examination was negative.  NECK:  Supple.  There were no palpable masses.  Thyroid was not enlarged and there were no carotid bruits.  RESPIRATORY:  The patient had normal inspirations and expirations.  The breath sounds were equal bilaterally and clear to auscultation.  CARDIOVASCULAR:  The patient had S1 normal, split S2 without obvious rubs, clicks, or murmurs.  GASTROINTESTINAL:  There was no hepatosplenomegaly.  There were no palpable masses and no inguinal nodes.  LYMPHATIC:  The patient had no axillary, groin, or  lymphadenopathy.  MUSCULOSKELETAL:  The patient had normal gait.  The joints appeared to be normal without evidence of deformity.  Grossly the muscles had good range of motion and were without effusion.  EXTREMITIES:  The patient's right hip wound is intact with no sign of infection.  NEUROLOGIC:  The patient had normal cranial nerves.  The reflexes, sensory, and motor examination were grossly within normal limits.  PSYCHIATRIC: The patient had normal judgment and insight.  The patient was oriented to person, place, and time, and had no obvious mood defect including depression, anxiety, and/or agitation.    LAB WORK:  Laboratory studies were reviewed from prior visit.    ASSESSMENT AND PLAN:  Right hip fracture, on pain control.  Recent UTI.  Continue to monitor.  Weakness, on PT/OT.  Fall risk, on fall precautions.  Insomnia, on melatonin.  Medication list reviewed and discussed with the family.  Hospital chart reviewed.

## 2023-10-05 NOTE — PROGRESS NOTES
NAME OF THE PATIENT: Deisi Barkley     YOB: 1940    PLACE OF SERVICE: [facility]    This is a subsequent visit.    HPI: Ms. Deisi Barkley is an 82-year-old female with history of right femur fracture.  She has undergone surgical repair.  She continues to be a fall risk and requires supportive care.    PAST MEDICAL HISTORY:  As per nursing home list.  Right hip fracture, vaginal prolapse, weakness, frequent falls, UTI, adjustment disorder and insomnia.    CURRENT MEDICATIONS:  As per nursing home list.  Reviewed.    ALLERGIES:  As per nursing home list.  No known allergies.    SOCIAL HISTORY:  As per nursing home list. The patient denies use of alcohol or tobacco.    FAMILY HISTORY:  As per nursing home list.  Positive history of hypertension in this patient's family.    REVIEW OF SYSTEMS:  All 12 systems reviewed and pertaining covered in history and physical, the rest are negative.  The patient denies any fevers, chills or chest pain at this time.    PHYSICAL EXAMINATION  GENERAL: This is a well-developed, well-nourished female, sitting in chair, in no acute distress.  VITAL SIGNS:  As recorded and reviewed from EMR.  Blood pressure 118/72.  Pulse 76.  Respirations 16.  Temperature 97.4.  EYES:  The patient's sclerae white.  The pupils were equal and round.  ENT:  The patient's external ears were normal and the otoscopic examination was negative.  NECK:  Supple.  There were no palpable masses.  Thyroid was not enlarged and there were no carotid bruits.  RESPIRATORY:  The patient had normal inspirations and expirations.  The breath sounds were equal bilaterally and clear to auscultation.  CARDIOVASCULAR:  The patient had S1 normal, split S2 without obvious rubs, clicks, or murmurs.  GASTROINTESTINAL:  There was no hepatosplenomegaly.  There were no palpable masses and no inguinal nodes.  LYMPHATIC:  The patient had no axillary, groin, or  lymphadenopathy.  MUSCULOSKELETAL:  The patient had normal gait.  The joints appeared to be normal without evidence of deformity.  Grossly the muscles had good range of motion and were without effusion.  EXTREMITIES: The patient's right leg wound is intact with no sign of infection.  NEUROLOGIC:  The patient had normal cranial nerves.  The reflexes, sensory, and motor examination were grossly within normal limits.  PSYCHIATRIC: The patient had normal judgment and insight.  The patient was oriented to person, place, and time, and had no obvious mood defect including depression, anxiety, and/or agitation.    LAB WORK: Laboratory studies reviewed from prior visit.    ASSESSMENT AND PLAN:  Right femur fracture, on pain control.  Weakness, on PT/OT.  Fall risk, fall precaution.  Recent UTI, continue to monitor.  Insomnia, on melatonin.  Adjustment disorder, continue to monitor.  Medication list reviewed and discussed with the family.  Hospital chart reviewed.

## 2023-10-05 NOTE — PROGRESS NOTES
NAME OF THE PATIENT: Deisi Barkley    YOB: 1940    PLACE OF SERVICE:  [facility].    This is a subsequent visit.    HPI: Ms. Deisi Barkley is an 83-year-old female with history of weakness and deconditioning.  She has had a prior fracture to her right hip.  She continues to be a fall risk and requires supportive care.    PAST MEDICAL HISTORY:  As per nursing home list.  Right hip fracture, vaginal prolapse, weakness, frequent falls, UTI, adjustment disorder and insomnia.    CURRENT MEDICATIONS:  As per nursing home list.  Reviewed.    ALLERGIES:  As per nursing home list.  No known allergies.    SOCIAL HISTORY:  As per nursing home list. The patient denies use of alcohol or tobacco.    FAMILY HISTORY:  As per nursing home list.  Positive history of hypertension in this patient's family.    REVIEW OF SYSTEMS:  All 12 systems reviewed and pertaining covered in history and physical, the rest are negative.  The patient denies any fevers, chills or chest pain at this time.    PHYSICAL EXAMINATION  GENERAL: This is a well-developed, well-nourished female, lying in bed, appearing weak and lethargic.  VITAL SIGNS:  As recorded and reviewed from EMR.  Blood pressure 118/72.  Pulse 74.  Respirations 16.  Temperature 97.6.  EYES:  The patient's sclerae white.  The pupils were equal and round.  ENT:  The patient's external ears were normal and the otoscopic examination was negative.  NECK:  Supple.  There were no palpable masses.  Thyroid was not enlarged and there were no carotid bruits.  RESPIRATORY:  The patient had normal inspirations and expirations.  The breath sounds were equal bilaterally and clear to auscultation.  CARDIOVASCULAR:  The patient had S1 normal, split S2 without obvious rubs, clicks, or murmurs.  GASTROINTESTINAL:  There was no hepatosplenomegaly.  There were no palpable masses and no inguinal nodes.  LYMPHATIC:  The patient had no axillary, groin,  or lymphadenopathy.  MUSCULOSKELETAL:  The patient had normal gait.  The joints appeared to be normal without evidence of deformity.  Grossly the muscles had good range of motion and were without effusion.  EXTREMITIES:  The patient's hip wound is intact with no sign of infection.  NEUROLOGIC:  The patient had normal cranial nerves.  The reflexes, sensory, and motor examination were grossly within normal limits.  PSYCHIATRIC: The patient had normal judgment and insight. The patient was oriented to person, place, and time, and had no obvious mood defect including depression, anxiety, and/or agitation.     LAB WORK: Laboratory studies were reviewed from prior visit.    ASSESSMENT AND PLAN:  Weakness, on PT/OT.  Fall risk, on fall precaution.  Recent hip fracture, continue to monitor.  History of UTI, continue to monitor.  Adjustment disorder, on medication.  Sleep disorder, on melatonin.   Medication list reviewed and discussed with the family.  Hospital chart reviewed.

## 2023-10-05 NOTE — PROGRESS NOTES
NAME OF THE PATIENT: Deisi Barkley    YOB: 1940    PLACE OF SERVICE:  [facility] [place]    This is a subsequent visit.    HPI:  Ms. Deisi Barkley is an 83-year-old female with history of weakness and deconditioning following a right femur fracture.  She has made slow improvement and requires supportive care.    PAST MEDICAL HISTORY:  As per nursing home list.  Right hip fracture, vaginal prolapse, weakness, frequent falls, UTI, adjustment disorder and insomnia.    CURRENT MEDICATIONS:  As per nursing home list.  Reviewed.    ALLERGIES:  As per nursing home list.  No known allergies.    SOCIAL HISTORY:  As per nursing home list. The patient denies use of alcohol or tobacco.    FAMILY HISTORY:  As per nursing home list.  Positive history of hypertension in this patient's family.    REVIEW OF SYSTEMS:  All 12 systems reviewed and pertaining covered in history and physical, the rest are negative.  The patient denies any fevers, chills or chest pain at this time.    PHYSICAL EXAMINATION  GENERAL:  This is a well-developed, well-nourished female, lying in bed, appearing weak and lethargic.  VITAL SIGNS:  As recorded and reviewed from EMR.  Blood pressure 120/72.  Pulse 76.  Respirations 16.  Temperature 97.7.  EYES:  The patient's sclerae white.  The pupils were equal and round.  ENT:  The patient's external ears were normal and the otoscopic examination was negative.  NECK:  Supple.  There were no palpable masses.  Thyroid was not enlarged and there were no carotid bruits.  RESPIRATORY:  The patient had normal inspirations and expirations.  The breath sounds were equal bilaterally and clear to auscultation.  CARDIOVASCULAR:  The patient had S1 normal, split S2 without obvious rubs, clicks, or murmurs.  GASTROINTESTINAL:  There was no hepatosplenomegaly.  There were no palpable masses and no inguinal nodes.  LYMPHATIC:  The patient had no axillary, groin, or  lymphadenopathy.  MUSCULOSKELETAL:  The patient had normal gait.  The joints appeared to be normal without evidence of deformity.  Grossly the muscles had good range of motion and were without effusion.  EXTREMITIES:  The patient's right leg wound is intact with no sign of infection.  NEUROLOGIC:  The patient had normal cranial nerves.  The reflexes, sensory, and motor examination were grossly within normal limits.  PSYCHIATRIC: The patient had normal judgment and insight.  The patient was oriented to person, place, and time, and had no obvious mood defect including depression, anxiety, and/or agitation.    LAB WORK: Laboratory studies were reviewed from prior visit.    ASSESSMENT AND PLAN:  Weakness, on PT/OT.  Fall risk, on fall precautions.  History of UTI, continue to monitor.  Femur fracture, on pain control.  Adjustment disorder, on medication.  Insomnia, on melatonin.  Medication list reviewed and discussed with the family.  Hospital chart reviewed.

## 2023-10-06 ENCOUNTER — PREP FOR PROCEDURE (OUTPATIENT)
Dept: OPERATING ROOM | Facility: CLINIC | Age: 83
End: 2023-10-06
Payer: MEDICARE

## 2023-10-06 DIAGNOSIS — Z01.818 PREOP TESTING: ICD-10-CM

## 2023-10-06 DIAGNOSIS — N84.0 ENDOMETRIAL POLYP: ICD-10-CM

## 2023-10-06 DIAGNOSIS — N39.3 STRESS INCONTINENCE: ICD-10-CM

## 2023-10-06 DIAGNOSIS — N81.10 POP-Q STAGE 4 CYSTOCELE: Primary | ICD-10-CM

## 2023-10-06 RX ORDER — ACETAMINOPHEN 325 MG/1
975 TABLET ORAL ONCE
Status: CANCELLED | OUTPATIENT
Start: 2023-10-06 | End: 2023-10-06

## 2023-10-06 RX ORDER — CEFAZOLIN SODIUM 2 G/100ML
2 INJECTION, SOLUTION INTRAVENOUS EVERY 8 HOURS
Status: DISCONTINUED | OUTPATIENT
Start: 2023-10-06 | End: 2023-10-06 | Stop reason: HOSPADM

## 2023-10-06 RX ORDER — HEPARIN SODIUM 5000 [USP'U]/ML
5000 INJECTION, SOLUTION INTRAVENOUS; SUBCUTANEOUS ONCE
Status: CANCELLED | OUTPATIENT
Start: 2023-10-06 | End: 2023-10-06

## 2023-10-06 RX ORDER — PHENAZOPYRIDINE HYDROCHLORIDE 200 MG/1
200 TABLET, FILM COATED ORAL ONCE
Status: CANCELLED | OUTPATIENT
Start: 2023-10-06 | End: 2023-10-06

## 2023-10-19 ENCOUNTER — TELEMEDICINE (OUTPATIENT)
Dept: OBSTETRICS AND GYNECOLOGY | Facility: CLINIC | Age: 83
End: 2023-10-19
Payer: MEDICARE

## 2023-10-19 DIAGNOSIS — N84.1 CERVICAL POLYP: ICD-10-CM

## 2023-10-19 DIAGNOSIS — N39.3 SUI (STRESS URINARY INCONTINENCE, FEMALE): ICD-10-CM

## 2023-10-19 DIAGNOSIS — N95.2 VAGINAL ATROPHY: ICD-10-CM

## 2023-10-19 DIAGNOSIS — N81.4 UTEROVAGINAL PROLAPSE: ICD-10-CM

## 2023-10-19 DIAGNOSIS — N81.10 POP-Q STAGE 4 CYSTOCELE: Primary | ICD-10-CM

## 2023-10-19 PROCEDURE — 99443 PR PHYS/QHP TELEPHONE EVALUATION 21-30 MIN: CPT | Performed by: OBSTETRICS & GYNECOLOGY

## 2023-10-20 NOTE — PROGRESS NOTES
[facility] Department of Urogynecology   Lynnette Son MD, MPH   [phone]    ASSESSMENT AND PLAN:   84yo woman with complete uterine procidentia and DINO. She has hx of vaginal bleeding with the bleeding likely from an ulceration on the prolapse. Recent femur fx and hip surgery in June 2023 with limited ROM/mobility issues - trouble putting feet in the stirrups for pelvic exams. Comorbidities include: Fibromuscular dysplasia.     Plan:   1. Complete uterine procidentia, DINO  - We counseled on the risks, surgical steps, benefits, expected outcomes, and postoperative recovery of the LeFort colpocleisis. The colpocleisis is suturing the front and back walls of the vagina together to prevent the prolapse from bulging out of the vagina past the introitus but we would leave two tracts from the cervix down the lateral sides of the vagina in case she were to ever develop vaginal bleeding/PMB in the future we would be able to know and evaluate it. She would not need a hysterectomy but we would order a pelvic U/S prior to the colpocleisis to evaluate the uterus prior to surgery. General risks of surgery include bleeding, infection, and damage to surrounding organs (i.e., bowel, bladder, ureters, and associated vasculature) if anything is damaged during surgery we will address it at that time. We specifically discussed that her anatomy will remain the same and her ability to defecate/urinate will remain intact but she will have a shortened vaginal length and will be unable to have penetrative intercourse after this procedure in the future. The benefits of this procedure is that it is quick with less time under anesthesia, less risks of damage to surrounding organs as we will not be operating abdominally, very low risk of infection and bleeding, and this procedure has a high success rate of around 95%.  - Recommended proceeding with the TVT as her UDS showed +DINO and this would mean that she would likely endorse  occult DINO after her colpocleisis if an anti-incontinent procedure is not performed at the time of her POP repair.   - Reassured her that she does not need a hysterectomy at the time of her POP repair.   - Planning to get cardiology clearance and will need PAT.   - Surgical plan includes: LeFort colpocleisis, OR, perineorrhaphy, midurethral sling, D&C, and cystoscopy with Dr. Lynnette Son @ [facility].   - She will call her son to arrange time for him to help with her postoperative care and will call us when she wishes to schedule the above surgery.     2. Endometrial polyp  - We reviewed her pelvic U/S findings with her which demonstrated a 2mm endometrial thickness and a 4mm possible endometrial polyp.   - Planning for D&C at the time of the colpocleisis as the radiologist believes this is not a concern for cancer but could cause some VB.     She will call to set up her surgery date.     Phone call visit today: The patient's identity was confirmed and that she is located in [place].   Dr. Son identified herself and the patient consented to a telehealth visit today. Telephone visit time: 11 minutes.    Scribe Attestation  By signing my name below, I, Froy Valentine, attest that this documentation has been prepared under the direction and in the presence of Lynnette Son MD MPH.       I Dr. Son, personally performed the services described in the documentation as scribed in my presence and confirm it is both complete and accurate.  Lynnette Son MD, MPH, FACOG    I spent a total of 22 minutes in face to face and non face to face time.      Lynnette Son MD, MPH, FACOG     Established    HISTORY OF PRESENT ILLNESS:   84yo presenting in follow up of DINO and complete uterine procidentia.     Record Review:   - 9/26/2023 visit: Planning colpocleisis for uterine procidentia. Underwent pelvic U/S which showed a 2mm endometrial thickness and 4mm possible polyp - spoke with radiologist and he does not think  this is potential for cancer but does think this could cause some bleeding. Planning to do D&C at the time of POP/DINO repair. +DINO confirmed on UDS.     Prolapse Symptoms:  - Her prolapse is interfering with her daily life as the prolapse causes friction and rubs against her underwear which is uncomfortable. Of note the E2 has helped improve some of this atrophic discomfort and tissue friability.   - She is wanting to wait to have surgery at a later time when her son is more available to help with her postoperative care. Patient is still amenable to the surgical plan but wants to wait a little longer to have help with her postop care.      Urinary Symptoms:   - She recently had +DINO on her UDS. Amenable to getting the TVT at the time of her colpocleisis.     Past Medical History:       Past Medical History:   Diagnosis Date    Adjustment disorder     Fall     Fibromuscular dysplasia of carotid artery (CMS/HCC)     Heart attack (CMS/HCC) 2014    Hip fracture (CMS/[facility])     Insomnia     OAB (overactive bladder)     Osteoporosis     PMB (postmenopausal bleeding)     Skin cancer of face     Urinary tract infection     Uterine prolapse     Weakness           Past Surgical History:       Past Surgical History:   Procedure Laterality Date    CARDIAC CATHETERIZATION Left     CT HEAD ANGIO W AND WO IV CONTRAST  04/17/2023    CT HEAD ANGIO W AND WO IV CONTRAST GEN CT    CT NECK ANGIO W AND WO IV CONTRAST  04/17/2023    CT NECK ANGIO W AND WO IV CONTRAST GEN CT    MR HEAD ANGIO W AND WO IV CONTRAST  04/17/2023    MR HEAD ANGIO W AND WO IV CONTRAST GEN MRI    MR NECK ANGIO W AND WO IV CONTRAST  04/17/2023    MR NECK ANGIO W AND WO IV CONTRAST GEN MRI         Medications:       Prior to Admission medications    Not on File        ROS  Review of Systems       PHYSICAL EXAM:      There were no vitals taken for this visit.     No physical exam - telephone visit.       Data and DIAGNOSTIC STUDIES REVIEWED   XR CHEST 2V  FRONTAL/LAT  IMPRESSION: No acute cardiopulmonary disease. Multiple compression deformities throughout the thoracic spine, correlate with tenderness to palpation to determine if these are acute. : PSCB   Transcribe Date/Time: Sep 28 2023  4:39P Dictated by : MIRANDA DAN MD This examination was interpreted and the report reviewed and electronically signed by: MIRANDA DAN MD on Sep 28 2023  4:42PM  EST    US pelvis transvaginal  Result Date: 9/21/2023  Interpreted By:  TEE ROSE MD and LAILA NIXON MD MRN: [id number] Patient Name: REEMA SKINNER  STUDY: US TRANSVAGINAL;  9/21/2023 11:48 am  INDICATION: planning colpocleisis  Z01.818: Preop testing.  COMPARISON: Pelvic ultrasound dated 08/11/2023  ACCESSION NUMBER(S): [id number]  ORDERING CLINICIAN: JUDY LIU  TECHNIQUE: Multiple multiplanar static gray scale, color and spectral waveform sonographic images of the pelvis were obtained.  Transvaginal ultrasound was performed.  This examination was interpreted at [facility].  FINDINGS: UTERUS: The uterus measures 8.0 x 4.1 x 3.1 cm. There is ill-defined heterogeneity of the uterine myometrium near the fundus anteriorly, without discrete measurable lesion.  ENDOMETRIUM: The endometrium measures a thickness of 2 mm, which is abnormally thin. There is also slight simple free fluid in the endometrial canal. A 4 mm ovoid echogenic structure along the mid to fundal aspect of the canal is suspicious for a polyp.  RIGHT ADNEXA: The right ovary was not visualized. No right adnexal mass is evident.  LEFT ADNEXA: The left ovary was not visualized. No left adnexal mass is evident.  CUL DE SAC: No free fluid is seen in the pelvic cul-de-sac.      1. Endometrial thickness of 2 mm, suggestive of atrophy, with trace free fluid in the canal. 2. 4 mm ovoid echogenic structure along the mid to fundal aspect of the canal is suspicious for an endometrial polyp. 3. Heterogeneity  of the uterine myometrium near the fundus anteriorly, suggestive of adenomyosis. 4. The ovaries are not discretely visualized. No adnexal mass is evident.  I personally reviewed the images/study and I agree with the resident findings as stated. This study was interpreted at [facility], [place], [place].  MACRO: None     Lab Results   Component Value Date    URINECULTURE (A) 07/18/2023     MULTIPLE ORGANISMS PRESENT, PROBABLE CONTAMINATION~PLEASE REPEAT CULTURE.      Lab Results   Component Value Date    GLUCOSE 118 (H) 07/18/2023    CALCIUM 9.6 07/18/2023     07/18/2023    K 3.9 07/18/2023    CO2 27 07/18/2023     07/18/2023    BUN 16 07/18/2023    CREATININE 0.70 07/18/2023     Lab Results   Component Value Date    WBC 5.4 07/18/2023    HGB 12.8 07/18/2023    HCT 40.2 07/18/2023     (H) 07/18/2023     07/18/2023

## 2023-10-23 PROBLEM — N39.3 STRESS INCONTINENCE: Status: ACTIVE | Noted: 2023-10-06

## 2023-10-23 PROBLEM — N81.10 POP-Q STAGE 4 CYSTOCELE: Status: ACTIVE | Noted: 2023-10-06

## 2023-10-23 PROBLEM — N84.0 ENDOMETRIAL POLYP: Status: ACTIVE | Noted: 2023-10-06

## 2023-11-08 ENCOUNTER — HOSPITAL ENCOUNTER (OUTPATIENT)
Dept: VASCULAR MEDICINE | Facility: HOSPITAL | Age: 83
Discharge: HOME | End: 2023-11-08
Payer: MEDICARE

## 2023-11-08 ENCOUNTER — TELEPHONE (OUTPATIENT)
Dept: CARDIOLOGY | Facility: HOSPITAL | Age: 83
End: 2023-11-08

## 2023-11-08 ENCOUNTER — OFFICE VISIT (OUTPATIENT)
Dept: CARDIOLOGY | Facility: HOSPITAL | Age: 83
End: 2023-11-08
Payer: MEDICARE

## 2023-11-08 ENCOUNTER — PHARMACY VISIT (OUTPATIENT)
Dept: PHARMACY | Facility: CLINIC | Age: 83
End: 2023-11-08
Payer: COMMERCIAL

## 2023-11-08 VITALS
DIASTOLIC BLOOD PRESSURE: 69 MMHG | OXYGEN SATURATION: 94 % | HEART RATE: 82 BPM | WEIGHT: 127 LBS | SYSTOLIC BLOOD PRESSURE: 135 MMHG

## 2023-11-08 DIAGNOSIS — I82.411 ACUTE DEEP VEIN THROMBOSIS (DVT) OF RIGHT FEMORAL VEIN (MULTI): Primary | ICD-10-CM

## 2023-11-08 DIAGNOSIS — M79.89 RIGHT LEG SWELLING: ICD-10-CM

## 2023-11-08 PROCEDURE — 1036F TOBACCO NON-USER: CPT | Performed by: INTERNAL MEDICINE

## 2023-11-08 PROCEDURE — 93971 EXTREMITY STUDY: CPT | Performed by: INTERNAL MEDICINE

## 2023-11-08 PROCEDURE — 99215 OFFICE O/P EST HI 40 MIN: CPT | Mod: PO | Performed by: INTERNAL MEDICINE

## 2023-11-08 PROCEDURE — 93971 EXTREMITY STUDY: CPT

## 2023-11-08 PROCEDURE — 1159F MED LIST DOCD IN RCRD: CPT | Performed by: INTERNAL MEDICINE

## 2023-11-08 PROCEDURE — 99215 OFFICE O/P EST HI 40 MIN: CPT | Performed by: INTERNAL MEDICINE

## 2023-11-08 PROCEDURE — 1160F RVW MEDS BY RX/DR IN RCRD: CPT | Performed by: INTERNAL MEDICINE

## 2023-11-08 PROCEDURE — RXMED WILLOW AMBULATORY MEDICATION CHARGE

## 2023-11-08 RX ORDER — ASPIRIN 81 MG/1
81 TABLET ORAL DAILY
COMMUNITY
End: 2023-11-08 | Stop reason: ALTCHOICE

## 2023-11-08 RX ORDER — APIXABAN 5 MG (74)
5 KIT ORAL 2 TIMES DAILY
Qty: 74 TABLET | Refills: 0 | Status: SHIPPED | OUTPATIENT
Start: 2023-11-08 | End: 2023-11-08 | Stop reason: SDUPTHER

## 2023-11-08 NOTE — PATIENT INSTRUCTIONS
You'll take your first dose of Eliquis here (two tablets). They will take effect within 3 hours and you will be protected from blood clots growing or moving. I want you to take the second dose (two tablets) right before bed. Tomorrow you can take your doses in the morning and the evening (it does not need to be exactly 12 hours apart).    You will take two tablets twice a day for 7 days (14 doses), then you will decrease the dose to one tablet twice a day.     I will try to get prior authorization for further refills of the Eliquis. I want you to fill out the assistance form while your son is visiting. If we are unable to obtain the Eliquis at a reasonable price, we will plan an alternative treatment.    We will plan for 3 months of blood thinners, then stop.    Should you have questions, please do not hesitate to call my office at [phone].    Let's plan to follow up in 3 months with a repeat ultrasound.

## 2023-11-08 NOTE — PROGRESS NOTES
Chief Complaint:   Carotid FMD     History Of Present Illness:    Deisi Barkley is a 83 y.o. female presenting for routine follow-up for likely FMD that is asymptomatic.     She presented to [facility] ED in mid-April with severe neck pain. A CT of the cervical spine showed a structure concerning for vertebral artery aneurysm, so CT angio performed. This suggested FMD, but no evidence of dissection. MRI /MRA also done, which showed no dissection and again evidence of FMD.      Since last visit she had a fall (June 2023). Admitted to [facility] then transferred to [facility] for hip fracture surgery and treatment of vaginal prolapse. Discharged to rehab facility 6/202/2023.     Right leg started swelling two weeks ago. Wearing a right knee brace; had been wearing a tight nylon one at rehab. Saw therapist yesterday; he was concerned about her leg swelling and said he was glad she was seeing the doctor today! She reports pain in the right posterior calf.    Denies chest pain, shortness of breath.    Past Medical History:   Diagnosis Date    Adjustment disorder     Fall     Fibromuscular dysplasia of carotid artery (CMS/HCC)     Heart attack (CMS/HCC) 2014    Hip fracture (CMS/HCC)     Insomnia     OAB (overactive bladder)     Osteoporosis     PMB (postmenopausal bleeding)     Skin cancer of face     Urinary tract infection     Uterine prolapse     Weakness      Past Surgical History:   Procedure Laterality Date    CARDIAC CATHETERIZATION Left     CT HEAD ANGIO W AND WO IV CONTRAST  04/17/2023    CT HEAD ANGIO W AND WO IV CONTRAST GEN CT    CT NECK ANGIO W AND WO IV CONTRAST  04/17/2023    CT NECK ANGIO W AND WO IV CONTRAST GEN CT    MR HEAD ANGIO W AND WO IV CONTRAST  04/17/2023    MR HEAD ANGIO W AND WO IV CONTRAST GEN MRI    MR NECK ANGIO W AND WO IV CONTRAST  04/17/2023    MR NECK ANGIO W AND WO IV CONTRAST GEN MRI           Social History:  She reports that she quit smoking about 59 years ago. Her smoking use included  cigarettes. She has never used smokeless tobacco. She reports that she does not drink alcohol and does not use drugs.    Family History:  Family History   Problem Relation Name Age of Onset    Rheum arthritis Sister      Other (trigeminal neuralgia) Son      Anxiety disorder Son      Rheum arthritis Mother's Sister      Hypertension Other          Allergies:  Patient has no known allergies.    Outpatient Medications:  Current Outpatient Medications   Medication Instructions    aspirin 81 mg, oral, Daily       Physical Exam:  Blood pressure 135/69, pulse 82, weight 57.6 kg (127 lb), SpO2 94 %.  Lungs clear  Heart regular, no murmurs  Abdomen soft  Right leg swelling, tenderness, dependent plethora; no phlegmasia     Last Labs:  CBC -  Lab Results   Component Value Date    WBC 5.4 07/18/2023    HGB 12.8 07/18/2023    HCT 40.2 07/18/2023     (H) 07/18/2023     07/18/2023       CMP -  Lab Results   Component Value Date    CALCIUM 9.6 07/18/2023    PROT 7.1 07/18/2023    ALBUMIN 3.8 07/18/2023    AST 16 07/18/2023    ALT 10 07/18/2023    ALKPHOS 134 07/18/2023    BILITOT 0.4 07/18/2023           RENAL FUNCTION PANEL -   Lab Results   Component Value Date    GLUCOSE 118 (H) 07/18/2023     07/18/2023    K 3.9 07/18/2023     07/18/2023    CO2 27 07/18/2023    ANIONGAP 11 07/18/2023    BUN 16 07/18/2023    CREATININE 0.70 07/18/2023    GFRMALE CANCELED 06/18/2023    CALCIUM 9.6 07/18/2023    ALBUMIN 3.8 07/18/2023      Venous duplex ultrasound today (11/8/2023)  Personally reviewed  The right proximal and mid femoral vein are partially compressible; there is thrombus visualized at proximal and mid. There is still color flow.      Assessment/Plan   Diagnoses and all orders for this visit:  Acute deep vein thrombosis (DVT) of right femoral vein (CMS/HCC)  -     apixaban 5 mg (74 tabs) tablets,dose pack; Take 2 tablets by mouth twice a day for 7 days, then take 1 tablet by mouth twice a day  -      Vascular US Lower Extremity Venous Duplex Right; Future  Right leg swelling  -     Vascular US Lower Extremity Venous Duplex Right; Future      Marj Mcgraw MD

## 2023-12-05 ENCOUNTER — TELEPHONE (OUTPATIENT)
Dept: CARDIOLOGY | Facility: HOSPITAL | Age: 83
End: 2023-12-05
Payer: MEDICARE

## 2023-12-05 DIAGNOSIS — I82.411 ACUTE DEEP VEIN THROMBOSIS (DVT) OF RIGHT FEMORAL VEIN (MULTI): Primary | ICD-10-CM

## 2023-12-05 RX ORDER — WARFARIN SODIUM 5 MG/1
5 TABLET ORAL EVERY EVENING
Qty: 30 TABLET | Refills: 11 | Status: SHIPPED | OUTPATIENT
Start: 2023-12-05 | End: 2024-02-14 | Stop reason: ALTCHOICE

## 2023-12-08 DIAGNOSIS — I82.411 ACUTE DEEP VEIN THROMBOSIS (DVT) OF RIGHT FEMORAL VEIN (MULTI): Primary | ICD-10-CM

## 2023-12-11 DIAGNOSIS — I82.411 ACUTE DEEP VEIN THROMBOSIS (DVT) OF RIGHT FEMORAL VEIN (MULTI): Primary | ICD-10-CM

## 2023-12-13 ENCOUNTER — LAB (OUTPATIENT)
Dept: LAB | Facility: LAB | Age: 83
End: 2023-12-13
Payer: MEDICARE

## 2023-12-13 DIAGNOSIS — I82.411 ACUTE DEEP VEIN THROMBOSIS (DVT) OF RIGHT FEMORAL VEIN (MULTI): ICD-10-CM

## 2023-12-13 DIAGNOSIS — Z01.818 PREOP TESTING: ICD-10-CM

## 2023-12-13 DIAGNOSIS — N81.10 POP-Q STAGE 4 CYSTOCELE: ICD-10-CM

## 2023-12-13 LAB
ANION GAP SERPL CALC-SCNC: 13 MMOL/L (ref 10–20)
BUN SERPL-MCNC: 15 MG/DL (ref 6–23)
CALCIUM SERPL-MCNC: 9.6 MG/DL (ref 8.6–10.3)
CHLORIDE SERPL-SCNC: 107 MMOL/L (ref 98–107)
CO2 SERPL-SCNC: 25 MMOL/L (ref 21–32)
CREAT SERPL-MCNC: 0.66 MG/DL (ref 0.5–1.05)
ERYTHROCYTE [DISTWIDTH] IN BLOOD BY AUTOMATED COUNT: 14.1 % (ref 11.5–14.5)
GFR SERPL CREATININE-BSD FRML MDRD: 86 ML/MIN/1.73M*2
GLUCOSE SERPL-MCNC: 90 MG/DL (ref 74–99)
HCT VFR BLD AUTO: 41.7 % (ref 36–46)
HGB BLD-MCNC: 13.5 G/DL (ref 12–16)
INR PPP: 1.3 (ref 0.9–1.1)
MCH RBC QN AUTO: 33.1 PG (ref 26–34)
MCHC RBC AUTO-ENTMCNC: 32.4 G/DL (ref 32–36)
MCV RBC AUTO: 102 FL (ref 80–100)
NRBC BLD-RTO: 0 /100 WBCS (ref 0–0)
PLATELET # BLD AUTO: 181 X10*3/UL (ref 150–450)
POTASSIUM SERPL-SCNC: 4 MMOL/L (ref 3.5–5.3)
PROTHROMBIN TIME: 15.2 SECONDS (ref 9.8–12.8)
RBC # BLD AUTO: 4.08 X10*6/UL (ref 4–5.2)
SODIUM SERPL-SCNC: 141 MMOL/L (ref 136–145)
WBC # BLD AUTO: 3.9 X10*3/UL (ref 4.4–11.3)

## 2023-12-13 PROCEDURE — 36415 COLL VENOUS BLD VENIPUNCTURE: CPT

## 2023-12-15 ENCOUNTER — TELEPHONE (OUTPATIENT)
Dept: CARDIOLOGY | Facility: HOSPITAL | Age: 83
End: 2023-12-15
Payer: MEDICARE

## 2024-02-14 ENCOUNTER — OFFICE VISIT (OUTPATIENT)
Dept: CARDIOLOGY | Facility: HOSPITAL | Age: 84
End: 2024-02-14
Payer: MEDICARE

## 2024-02-14 ENCOUNTER — HOSPITAL ENCOUNTER (OUTPATIENT)
Dept: VASCULAR MEDICINE | Facility: HOSPITAL | Age: 84
Discharge: HOME | End: 2024-02-14
Payer: MEDICARE

## 2024-02-14 VITALS
WEIGHT: 130 LBS | HEART RATE: 86 BPM | DIASTOLIC BLOOD PRESSURE: 55 MMHG | SYSTOLIC BLOOD PRESSURE: 144 MMHG | OXYGEN SATURATION: 97 %

## 2024-02-14 DIAGNOSIS — I82.411 ACUTE DEEP VEIN THROMBOSIS (DVT) OF RIGHT FEMORAL VEIN (MULTI): Primary | ICD-10-CM

## 2024-02-14 DIAGNOSIS — I82.411 ACUTE DEEP VEIN THROMBOSIS (DVT) OF RIGHT FEMORAL VEIN (MULTI): ICD-10-CM

## 2024-02-14 PROCEDURE — 1126F AMNT PAIN NOTED NONE PRSNT: CPT | Performed by: INTERNAL MEDICINE

## 2024-02-14 PROCEDURE — 1159F MED LIST DOCD IN RCRD: CPT | Performed by: INTERNAL MEDICINE

## 2024-02-14 PROCEDURE — 93971 EXTREMITY STUDY: CPT

## 2024-02-14 PROCEDURE — 93971 EXTREMITY STUDY: CPT | Performed by: INTERNAL MEDICINE

## 2024-02-14 PROCEDURE — 99214 OFFICE O/P EST MOD 30 MIN: CPT | Performed by: INTERNAL MEDICINE

## 2024-02-14 PROCEDURE — 1160F RVW MEDS BY RX/DR IN RCRD: CPT | Performed by: INTERNAL MEDICINE

## 2024-02-14 PROCEDURE — 1036F TOBACCO NON-USER: CPT | Performed by: INTERNAL MEDICINE

## 2024-02-14 PROCEDURE — 99214 OFFICE O/P EST MOD 30 MIN: CPT | Mod: 25,27 | Performed by: INTERNAL MEDICINE

## 2024-02-14 NOTE — PROGRESS NOTES
Chief Complaint:   Right leg DVT     History of Present Illness:    Deisi Barkley is an 82 y/o woman who follows up for provoked right leg DVT after hip fracture surgery. DVT dx 2024. She has completed 3 months of anticoagulation. She had venous duplex today that shows only chronic post-thrombotic change. She has some residual swelling worse if she's been sitting.    She needs to have surgery later this month for bladder prolapse.     Past Medical History:   Diagnosis Date    Adjustment disorder     Fall     Fibromuscular dysplasia of carotid artery (CMS/HCC)     Heart attack (CMS/HCC) 2014    Hip fracture (CMS/HCC)     Insomnia     OAB (overactive bladder)     Osteoporosis     PMB (postmenopausal bleeding)     Skin cancer of face     Urinary tract infection     Uterine prolapse     Weakness      Past Surgical History:   Procedure Laterality Date    CARDIAC CATHETERIZATION Left     CT ANGIO NECK W  2023    CT NECK ANGIO W AND WO IV CONTRAST GEN CT    CT HEAD ANGIO W AND WO IV CONTRAST  2023    CT HEAD ANGIO W AND WO IV CONTRAST GEN CT    MR HEAD ANGIO W AND WO IV CONTRAST  2023    MR HEAD ANGIO W AND WO IV CONTRAST GEN MRI    MR NECK ANGIO W AND WO IV CONTRAST  2023    MR NECK ANGIO W AND WO IV CONTRAST GEN MRI     Social History     Tobacco Use    Smoking status: Former     Types: Cigarettes     Quit date:      Years since quittin.1    Smokeless tobacco: Never   Substance Use Topics    Alcohol use: Never    Drug use: Never         Family History:  Family History   Problem Relation Name Age of Onset    Rheum arthritis Sister      Other (trigeminal neuralgia) Son      Anxiety disorder Son      Rheum arthritis Mother's Sister      Hypertension Other          Allergies:  Patient has no known allergies.    Outpatient Medications:  Current Outpatient Medications   Medication Instructions    apixaban (ELIQUIS) 5 mg, oral, 2 times daily     Blood pressure 144/55, pulse 86, weight 59 kg  "(130 lb), SpO2 97 %.    Physical Exam:  No distress  No JVD or carotid bruits  Lungs clear bilaterally  Heart regular and without murmurs  Abdomen soft and non-tender  Mild right leg swelling with hemosiderin staining  Pulses intact       Last Labs:  CBC -  Lab Results   Component Value Date    WBC 3.9 (L) 12/13/2023    HGB 13.5 12/13/2023    HCT 41.7 12/13/2023     (H) 12/13/2023     12/13/2023       CMP -  Lab Results   Component Value Date    CALCIUM 9.6 12/13/2023    PROT 7.1 07/18/2023    ALBUMIN 3.8 07/18/2023    AST 16 07/18/2023    ALT 10 07/18/2023    ALKPHOS 134 07/18/2023    BILITOT 0.4 07/18/2023       LIPID PANEL -   No results found for: \"CHOL\", \"TRIG\", \"HDL\", \"CHHDL\", \"LDLF\", \"VLDL\", \"NHDL\"    RENAL FUNCTION PANEL -   Lab Results   Component Value Date    GLUCOSE 90 12/13/2023     12/13/2023    K 4.0 12/13/2023     12/13/2023    CO2 25 12/13/2023    ANIONGAP 13 12/13/2023    BUN 15 12/13/2023    CREATININE 0.66 12/13/2023    GFRMALE CANCELED 06/18/2023    CALCIUM 9.6 12/13/2023    ALBUMIN 3.8 07/18/2023        No results found for: \"BNP\", \"HGBA1C\"    Venous duplex ultrasound 02/14/24  PRELIMINARY CONCLUSIONS:  Right Lower Venous: No evidence of acute deep vein thrombus visualized in the right lower extremity. There are chronic changes visualized in the proximal femoral, mid femoral and distal Femoral veins.  Left Lower Venous: Left common femoral vein is negative for deep vein thrombus.     Imaging & Doppler Findings:    Assessment/Plan   Diagnoses and all orders for this visit:  Acute deep vein thrombosis (DVT) of right femoral vein (CMS/HCC)    I want you to take your last dose of Eliquis on 2/24/2024 in the morning. This will allow the drug to be completely out of your system by the time you have surgery.    Post-op, when Dr. Son is OK with it, I would like you to go back on the Eliquis for at least another 4 weeks to prevent recurrent blood clots.    Your blood clot " is resolved, but we do see a little scar in the vein, which can contribute to intermittent leg swelling, especially if you've been sitting for a long time. You might try some support socks to help with swelling.    You can stop the Eliquis at the last of March.  I want to see you back in June to check in.    Should you have questions, please do not hesitate to call my office at [phone].      Marj Mcgraw MD

## 2024-02-14 NOTE — PATIENT INSTRUCTIONS
I want you to take your last dose of Eliquis on 2/24/2024 in the morning. This will allow the drug to be completely out of your system by the time you have surgery.    Post-op, when Dr. Son is OK with it, I would like you to go back on the Eliquis for at least another 4 weeks to prevent recurrent blood clots.    Your blood clot is resolved, but we do see a little scar in the vein, which can contribute to intermittent leg swelling, especially if you've been sitting for a long time. You might try some support socks to help with swelling.    You can stop the Eliquis at the last of March.  I want to see you back in June to check in.    Should you have questions, please do not hesitate to call my office at [phone].

## 2024-02-19 ENCOUNTER — TELEPHONE (OUTPATIENT)
Dept: OBSTETRICS AND GYNECOLOGY | Facility: CLINIC | Age: 84
End: 2024-02-19
Payer: MEDICARE

## 2024-02-19 DIAGNOSIS — Z01.818 PREOP TESTING: Primary | ICD-10-CM

## 2024-02-19 NOTE — TELEPHONE ENCOUNTER
Patient called me on 2/16/2024 with questions regarding her surgery. She thought that she was supposed to have PAT; but had not been contacted. She also has issues with transportation on the day of surgery. She states that her son is a long haul , and is planning to be her ride to hospital. She is hoping to be a case later in the day, rather than early morning. Today, I called [facility] and left message for PAT that Dr Son  just put in the orders for PAT. I also spoke with surgery scheduling requesting that Deisi Barkley be the 2nd case of the day due to transportation issues. They agreed to arrange this. Pat has patient scheduled for appointment on /23/2024 at 830 am. I spoke with patient today, and she is aware of all of the above information.

## 2024-02-22 ENCOUNTER — PRE-ADMISSION TESTING (OUTPATIENT)
Dept: PREADMISSION TESTING | Facility: HOSPITAL | Age: 84
End: 2024-02-22
Payer: MEDICARE

## 2024-02-22 VITALS
DIASTOLIC BLOOD PRESSURE: 81 MMHG | BODY MASS INDEX: 25.49 KG/M2 | HEART RATE: 77 BPM | HEIGHT: 61 IN | RESPIRATION RATE: 16 BRPM | WEIGHT: 135 LBS | OXYGEN SATURATION: 96 % | SYSTOLIC BLOOD PRESSURE: 144 MMHG | TEMPERATURE: 97.2 F

## 2024-02-22 DIAGNOSIS — Z01.818 PREOP TESTING: Primary | ICD-10-CM

## 2024-02-22 LAB
BASOPHILS # BLD AUTO: 0 X10*3/UL (ref 0–0.1)
BASOPHILS NFR BLD AUTO: 0 %
EOSINOPHIL # BLD AUTO: 0.04 X10*3/UL (ref 0–0.4)
EOSINOPHIL NFR BLD AUTO: 0.9 %
ERYTHROCYTE [DISTWIDTH] IN BLOOD BY AUTOMATED COUNT: 13 % (ref 11.5–14.5)
HCT VFR BLD AUTO: 41.2 % (ref 36–46)
HGB BLD-MCNC: 13.5 G/DL (ref 12–16)
IMM GRANULOCYTES # BLD AUTO: 0.03 X10*3/UL (ref 0–0.5)
IMM GRANULOCYTES NFR BLD AUTO: 0.6 % (ref 0–0.9)
LYMPHOCYTES # BLD AUTO: 2.06 X10*3/UL (ref 0.8–3)
LYMPHOCYTES NFR BLD AUTO: 44.3 %
MCH RBC QN AUTO: 31.8 PG (ref 26–34)
MCHC RBC AUTO-ENTMCNC: 32.8 G/DL (ref 32–36)
MCV RBC AUTO: 97 FL (ref 80–100)
MONOCYTES # BLD AUTO: 0.59 X10*3/UL (ref 0.05–0.8)
MONOCYTES NFR BLD AUTO: 12.7 %
NEUTROPHILS # BLD AUTO: 1.93 X10*3/UL (ref 1.6–5.5)
NEUTROPHILS NFR BLD AUTO: 41.5 %
NRBC BLD-RTO: 0 /100 WBCS (ref 0–0)
PLATELET # BLD AUTO: 153 X10*3/UL (ref 150–450)
RBC # BLD AUTO: 4.24 X10*6/UL (ref 4–5.2)
WBC # BLD AUTO: 4.7 X10*3/UL (ref 4.4–11.3)

## 2024-02-22 PROCEDURE — 93010 ELECTROCARDIOGRAM REPORT: CPT | Performed by: INTERNAL MEDICINE

## 2024-02-22 PROCEDURE — 85025 COMPLETE CBC W/AUTO DIFF WBC: CPT

## 2024-02-22 PROCEDURE — 99203 OFFICE O/P NEW LOW 30 MIN: CPT | Performed by: NURSE PRACTITIONER

## 2024-02-22 PROCEDURE — 93005 ELECTROCARDIOGRAM TRACING: CPT

## 2024-02-22 PROCEDURE — 36415 COLL VENOUS BLD VENIPUNCTURE: CPT

## 2024-02-22 RX ORDER — GLUCOSAMINE/CHONDRO SU A 500-400 MG
1 TABLET ORAL DAILY
COMMUNITY

## 2024-02-22 RX ORDER — BUTALB/ACETAMINOPHEN/CAFFEINE 50-325-40
1 TABLET ORAL 4 TIMES DAILY
COMMUNITY

## 2024-02-22 ASSESSMENT — DUKE ACTIVITY SCORE INDEX (DASI)
CAN YOU HAVE SEXUAL RELATIONS: NO
CAN YOU DO MODERATE WORK AROUND THE HOUSE LIKE VACUUMING, SWEEPING FLOORS OR CARRYING GROCERIES: NO
CAN YOU PARTICIPATE IN STRENOUS SPORTS LIKE SWIMMING, SINGLES TENNIS, FOOTBALL, BASKETBALL, OR SKIING: NO
CAN YOU RUN A SHORT DISTANCE: NO
CAN YOU CLIMB A FLIGHT OF STAIRS OR WALK UP A HILL: NO
CAN YOU PARTICIPATE IN MODERATE RECREATIONAL ACTIVITIES LIKE GOLF, BOWLING, DANCING, DOUBLES TENNIS OR THROWING A BASEBALL OR FOOTBALL: NO
DASI METS SCORE: 3.6
CAN YOU DO HEAVY WORK AROUND THE HOUSE LIKE SCRUBBING FLOORS OR LIFTING AND MOVING HEAVY FURNITURE: NO
TOTAL_SCORE: 7.2
CAN YOU DO YARD WORK LIKE RAKING LEAVES, WEEDING OR PUSHING A MOWER: NO
CAN YOU WALK INDOORS, SUCH AS AROUND YOUR HOUSE: YES
CAN YOU DO LIGHT WORK AROUND THE HOUSE LIKE DUSTING OR WASHING DISHES: YES
CAN YOU TAKE CARE OF YOURSELF (EAT, DRESS, BATHE, OR USE TOILET): YES
CAN YOU WALK A BLOCK OR TWO ON LEVEL GROUND: NO

## 2024-02-22 ASSESSMENT — CHADS2 SCORE
CHF: NO
DIABETES: NO
CHADS2 SCORE: 2
PRIOR STROKE OR TIA OR THROMBOEMBOLISM: NO
HYPERTENSION: YES
AGE GREATER THAN OR EQUAL TO 75: YES

## 2024-02-22 ASSESSMENT — ENCOUNTER SYMPTOMS
HEMATOLOGIC/LYMPHATIC NEGATIVE: 1
DYSURIA: 0
ACTIVITY CHANGE: 1
NEUROLOGICAL NEGATIVE: 1
PSYCHIATRIC NEGATIVE: 1
FREQUENCY: 1
HEMATURIA: 0
RESPIRATORY NEGATIVE: 1
EYES NEGATIVE: 1

## 2024-02-22 ASSESSMENT — PAIN SCALES - GENERAL: PAINLEVEL_OUTOF10: 0 - NO PAIN

## 2024-02-22 ASSESSMENT — PAIN - FUNCTIONAL ASSESSMENT: PAIN_FUNCTIONAL_ASSESSMENT: 0-10

## 2024-02-22 NOTE — CPM/PAT H&P
CPM/PAT Evaluation       Name: Deisi Barkley (Deisi Barkley)  /Age: 1940/83 y.o.     In-Person       Chief Complaint: Stress Incontinence    HPI    Pt is an 83 year old female with complaints of stress incontinence and urinary frequency. Pt has a stage 4 cystocele. Pt report she has first noticed symptoms from pelvic organ prolapse about 5 years ago. Over the past few years she has had worsening symptoms of urinary frequency and urine leakage. Pt reports she has to wear a pad daily for the urine leakage. Pt denies dysuria, gross hematuria, or pelvic pain. Pt was examined by her physician and has been scheduled for colpocleisis and colporrhaphy anterior and posterior vaginal wall. Pt denies CP, SOB, or dizziness.     Past Medical History:   Diagnosis Date    Adjustment disorder     DVT (deep venous thrombosis) (CMS/Formerly [facility])     right leg 2024    Fall     Fibromuscular dysplasia of carotid artery (CMS/[facility])     Heart attack (CMS/[facility]ly [facility]) 2014    Hip fracture (CMS/[facility]ly [facility])     Insomnia     OAB (overactive bladder)     Osteoporosis     PMB (postmenopausal bleeding)     Skin cancer     on face    Urinary tract infection     Uterine prolapse     Weakness        Past Surgical History:   Procedure Laterality Date    ADENOIDECTOMY      CARDIAC CATHETERIZATION Left     with no stent placed    CT ANGIO NECK  2023    CT NECK ANGIO W AND WO IV CONTRAST GEN CT    CT HEAD ANGIO W AND WO IV CONTRAST  2023    CT HEAD ANGIO W AND WO IV CONTRAST GEN CT    MR HEAD ANGIO W AND WO IV CONTRAST  2023    MR HEAD ANGIO W AND WO IV CONTRAST GEN MRI    MR NECK ANGIO W AND WO IV CONTRAST  2023    MR NECK ANGIO W AND WO IV CONTRAST GEN MRI    OTHER SURGICAL HISTORY Right     femur fracture repair    TONSILLECTOMY      TUBAL LIGATION       Social History     Tobacco Use    Smoking status: Former     Types: Cigarettes     Quit date:      Years since quittin.1    Smokeless tobacco: Never   Substance Use Topics     "Alcohol use: Never     Social History     Substance and Sexual Activity   Drug Use Never     Patient  has no history on file for sexual activity.    Family History   Problem Relation Name Age of Onset    Rheum arthritis Sister      Other (trigeminal neuralgia) Son      Anxiety disorder Son      Rheum arthritis Mother's Sister      Hypertension Other         No Known Allergies    Current Outpatient Medications   Medication Sig Dispense Refill    apixaban (Eliquis) 5 mg tablet Take 1 tablet (5 mg) by mouth 2 times a day.      calcium citrate-vitamin D3 (Citracal+D) 315 mg-5 mcg (200 unit) tablet Take 1 tablet by mouth 4 times a day.      garlic (GARLIQUE ORAL) Take 1 tablet by mouth once daily.      glucosamine-chondroitin 500-400 mg tablet Take 1 tablet by mouth once daily. MOVE FREE      multivitamin with minerals iron-free (Centrum Silver) Take 1 tablet by mouth once daily.       No current facility-administered medications for this visit.      Review of Systems   Constitutional:  Positive for activity change.   HENT: Negative.     Eyes: Negative.    Respiratory: Negative.     Cardiovascular:  Positive for leg swelling (trace BLE edema).   Genitourinary:  Positive for frequency. Negative for dysuria and hematuria.   Musculoskeletal:         Mild right hip pain   Skin: Negative.    Neurological: Negative.    Hematological: Negative.    Psychiatric/Behavioral: Negative.       /81   Pulse 77   Temp 36.2 °C (97.2 °F) (Temporal)   Resp 16   Ht 1.549 m (5' 1\")   Wt 61.2 kg (135 lb)   SpO2 96%   BMI 25.51 kg/m²     Physical Exam  Constitutional:       Appearance: Normal appearance.   HENT:      Head: Normocephalic and atraumatic.      Nose: Nose normal.      Mouth/Throat:      Mouth: Mucous membranes are moist.      Pharynx: Oropharynx is clear.   Eyes:      Extraocular Movements: Extraocular movements intact.      Conjunctiva/sclera: Conjunctivae normal.      Pupils: Pupils are equal, round, and reactive to " light.   Cardiovascular:      Rate and Rhythm: Normal rate and regular rhythm.      Pulses: Normal pulses.      Heart sounds: Normal heart sounds.   Pulmonary:      Effort: Pulmonary effort is normal.      Breath sounds: Normal breath sounds.   Abdominal:      General: Bowel sounds are normal.      Palpations: Abdomen is soft.   Musculoskeletal:         General: Swelling (trace BLE edema) present.      Cervical back: Normal range of motion and neck supple.      Comments: Pt stated she walks slowly using a walker. Pt reports when she has to walk long distances she will use a wheel chair   Skin:     General: Skin is warm and dry.   Neurological:      General: No focal deficit present.      Mental Status: She is alert and oriented to person, place, and time.   Psychiatric:         Mood and Affect: Mood normal.         Behavior: Behavior normal.         Thought Content: Thought content normal.         Judgment: Judgment normal.        PAT AIRWAY:   Airway:     Mallampati::  III    TM distance::  >3 FB    Neck ROM::  Full  normal      ASA: III  DASI: 7.2  METS: 3.6  CHADS: 4%  RCRI: 0.4%  STOP BAN    Assessment and Plan:     POP- Q stage 4 cystocele, stress incontinence, endometrial polyp: Colpocleisis, colporrhaphy anterior and posterior vaginal wall.   Right leg DVT: after hip fracture surgery; Pt is taking Eliquis. Pt received anticoagulation holding instructions from Dr Marj Mcgraw cardiologist.   H/O MI in  s/p cardiac cath with no stents placed.   Followed by Dr. Marj Mcgraw  Fibromuscular dysplasia of carotid arteries:    US of carotids 2023   Right Carotid: Findings are consistent with less than 50% stenosis of the right proximal ICA. Tortuous right mid and distal internal carotid artery noted. Right external carotid artery appears patent with no evidence of stenosis. The right vertebral artery is patent with antegrade flow. No evidence of hemodynamically significant stenosis in the right  subclavian artery.  Left Carotid: Findings are consistent with less than 50% stenosis of the left proximal ICA. Tortuosity is noted left distal internal carotid artery. Left external carotid artery appears patent with no evidence of stenosis. The left vertebral artery is patent with antegrade flow. No evidence of hemodynamically significant stenosis in the left subclavian artery.    EKG performed in [facility] preliminary report:  Sinus rhythm with occasional premature ventricular complexes, right bundle branch block    CBC Drawn in [facility].  12/13/2023 ANIBAL Small-CNP

## 2024-02-22 NOTE — H&P (VIEW-ONLY)
CPM/PAT Evaluation       Name: Deisi Barkley (Deisi Barkley)  /Age: 1940/83 y.o.     In-Person       Chief Complaint: Stress Incontinence    HPI    Pt is an 83 year old female with complaints of stress incontinence and urinary frequency. Pt has a stage 4 cystocele. Pt report she has first noticed symptoms from pelvic organ prolapse about 5 years ago. Over the past few years she has had worsening symptoms of urinary frequency and urine leakage. Pt reports she has to wear a pad daily for the urine leakage. Pt denies dysuria, gross hematuria, or pelvic pain. Pt was examined by her physician and has been scheduled for colpocleisis and colporrhaphy anterior and posterior vaginal wall. Pt denies CP, SOB, or dizziness.     Past Medical History:   Diagnosis Date    Adjustment disorder     DVT (deep venous thrombosis) (CMS/[facility])     right leg 2024    Fall     Fibromuscular dysplasia of carotid artery (CMS/[facility])     Heart attack (CMS/[facility]) 2014    Hip fracture (CMS/[facility])     Insomnia     OAB (overactive bladder)     Osteoporosis     PMB (postmenopausal bleeding)     Skin cancer     on face    Urinary tract infection     Uterine prolapse     Weakness        Past Surgical History:   Procedure Laterality Date    ADENOIDECTOMY      CARDIAC CATHETERIZATION Left     with no stent placed    CT ANGIO NECK  2023    CT NECK ANGIO W AND WO IV CONTRAST GEN CT    CT HEAD ANGIO W AND WO IV CONTRAST  2023    CT HEAD ANGIO W AND WO IV CONTRAST GEN CT    MR HEAD ANGIO W AND WO IV CONTRAST  2023    MR HEAD ANGIO W AND WO IV CONTRAST GEN MRI    MR NECK ANGIO W AND WO IV CONTRAST  2023    MR NECK ANGIO W AND WO IV CONTRAST GEN MRI    OTHER SURGICAL HISTORY Right     femur fracture repair    TONSILLECTOMY      TUBAL LIGATION       Social History     Tobacco Use    Smoking status: Former     Types: Cigarettes     Quit date:      Years since quittin.1    Smokeless tobacco: Never   Substance Use Topics     "Alcohol use: Never     Social History     Substance and Sexual Activity   Drug Use Never     Patient  has no history on file for sexual activity.    Family History   Problem Relation Name Age of Onset    Rheum arthritis Sister      Other (trigeminal neuralgia) Son      Anxiety disorder Son      Rheum arthritis Mother's Sister      Hypertension Other         No Known Allergies    Current Outpatient Medications   Medication Sig Dispense Refill    apixaban (Eliquis) 5 mg tablet Take 1 tablet (5 mg) by mouth 2 times a day.      calcium citrate-vitamin D3 (Citracal+D) 315 mg-5 mcg (200 unit) tablet Take 1 tablet by mouth 4 times a day.      garlic (GARLIQUE ORAL) Take 1 tablet by mouth once daily.      glucosamine-chondroitin 500-400 mg tablet Take 1 tablet by mouth once daily. MOVE FREE      multivitamin with minerals iron-free (Centrum Silver) Take 1 tablet by mouth once daily.       No current facility-administered medications for this visit.      Review of Systems   Constitutional:  Positive for activity change.   HENT: Negative.     Eyes: Negative.    Respiratory: Negative.     Cardiovascular:  Positive for leg swelling (trace BLE edema).   Genitourinary:  Positive for frequency. Negative for dysuria and hematuria.   Musculoskeletal:         Mild right hip pain   Skin: Negative.    Neurological: Negative.    Hematological: Negative.    Psychiatric/Behavioral: Negative.       /81   Pulse 77   Temp 36.2 °C (97.2 °F) (Temporal)   Resp 16   Ht 1.549 m (5' 1\")   Wt 61.2 kg (135 lb)   SpO2 96%   BMI 25.51 kg/m²     Physical Exam  Constitutional:       Appearance: Normal appearance.   HENT:      Head: Normocephalic and atraumatic.      Nose: Nose normal.      Mouth/Throat:      Mouth: Mucous membranes are moist.      Pharynx: Oropharynx is clear.   Eyes:      Extraocular Movements: Extraocular movements intact.      Conjunctiva/sclera: Conjunctivae normal.      Pupils: Pupils are equal, round, and reactive to " light.   Cardiovascular:      Rate and Rhythm: Normal rate and regular rhythm.      Pulses: Normal pulses.      Heart sounds: Normal heart sounds.   Pulmonary:      Effort: Pulmonary effort is normal.      Breath sounds: Normal breath sounds.   Abdominal:      General: Bowel sounds are normal.      Palpations: Abdomen is soft.   Musculoskeletal:         General: Swelling (trace BLE edema) present.      Cervical back: Normal range of motion and neck supple.      Comments: Pt stated she walks slowly using a walker. Pt reports when she has to walk long distances she will use a wheel chair   Skin:     General: Skin is warm and dry.   Neurological:      General: No focal deficit present.      Mental Status: She is alert and oriented to person, place, and time.   Psychiatric:         Mood and Affect: Mood normal.         Behavior: Behavior normal.         Thought Content: Thought content normal.         Judgment: Judgment normal.        PAT AIRWAY:   Airway:     Mallampati::  III    TM distance::  >3 FB    Neck ROM::  Full  normal      ASA: III  DASI: 7.2  METS: 3.6  CHADS: 4%  RCRI: 0.4%  STOP BAN    Assessment and Plan:     POP- Q stage 4 cystocele, stress incontinence, endometrial polyp: Colpocleisis, colporrhaphy anterior and posterior vaginal wall.   Right leg DVT: after hip fracture surgery; Pt is taking Eliquis. Pt received anticoagulation holding instructions from Dr Marj Mcgraw cardiologist.   H/O MI in  s/p cardiac cath with no stents placed.   Followed by Dr. Marj Mcgraw  Fibromuscular dysplasia of carotid arteries:    US of carotids 2023   Right Carotid: Findings are consistent with less than 50% stenosis of the right proximal ICA. Tortuous right mid and distal internal carotid artery noted. Right external carotid artery appears patent with no evidence of stenosis. The right vertebral artery is patent with antegrade flow. No evidence of hemodynamically significant stenosis in the right  subclavian artery.  Left Carotid: Findings are consistent with less than 50% stenosis of the left proximal ICA. Tortuosity is noted left distal internal carotid artery. Left external carotid artery appears patent with no evidence of stenosis. The left vertebral artery is patent with antegrade flow. No evidence of hemodynamically significant stenosis in the left subclavian artery.    EKG performed in [facility] preliminary report:  Sinus rhythm with occasional premature ventricular complexes, right bundle branch block    CBC Drawn in [facility].  12/13/2023 ANIBAL Small-CNP

## 2024-02-22 NOTE — PREPROCEDURE INSTRUCTIONS
Medication List            Accurate as of February 22, 2024  8:24 AM. Always use your most recent med list.                apixaban 5 mg tablet  Commonly known as: Eliquis  Notes to patient: LAST DOSE 2/24/2024 830AM PER DR MCKEON, CARDIOLOGIST                              PAT DISCHARGE INSTRUCTIONS    Please call the Same Day Surgery (SDS) Department of the hospital where your procedure will be performed after 2:00 PM the day before your surgery. If you are scheduled on a Monday, or a Tuesday following a Monday holiday, you will need to call on the last business day prior to your surgery.      [place] [id number] [place] [id number] [phone]      Please let your surgeon know if:      You develop any open sores, shingles, burning or painful urination as these may increase your risk of an infection.   You no longer wish to have the surgery.   Any other personal circumstances change that may lead to the need to cancel or defer this surgery-such as being sick or getting admitted to any hospital within one week of your planned procedure.    Your contact details change, such as a change of address or phone number.    Starting now:     Please DO NOT drink alcohol or smoke for 24 hours before surgery. It is well known that quitting smoking can make a huge difference to your health and recovery from surgery. The longer you abstain from smoking, the better your chances of a healthy recovery. If you need help with quitting, call 1-800-QUIT-NOW to be connected to a trained counselor who will discuss the best methods to help you quit.     Before your surgery:    Please stop all supplements 7 days prior to surgery. Or as directed by your surgeon.   Please stop taking NSAID pain medicine such as Advil and Motrin 7 days before surgery.    If you develop any fever, cough, cold, rashes, cuts, scratches, scrapes, urinary symptoms or infection anywhere on your body (including teeth and  gums) prior to surgery, please call your surgeon’s office as soon as possible. This may require treatment to reduce the chance of cancellation on the day of surgery.    The day before your surgery:   DIET- Do not eat or drink anything after midnight the night before surgery, including mints, candy and gum.   Get a good night’s rest.  Use the special soap for bathing if you have been instructed to use one.    Scheduled surgery times may change and you will be notified if this occurs - please check your personal voicemail for any updates.     On the morning of surgery:   Wear comfortable, loose fitting clothes which open in the front. Please do not wear moisturizers, creams, lotions, makeup or perfume.    Please bring with you to surgery:   Photo ID and insurance card   Current list of medicines and allergies   Pacemaker/ Defibrillator/Heart stent cards   CPAP machine and mask    Slings/ splints/ crutches   A copy of your complete advanced directive/DHPOA.    Please do NOT bring with you to surgery:   All jewelry and valuables should be left at home.   Prosthetic devices such as contact lenses, hearing aids, dentures, eyelash extensions, hairpins and body piercings must be removed prior to going in to the surgical suite.    After outpatient surgery:   A responsible adult MUST accompany you at the time of discharge and stay with you for 24 hours after your surgery. You may NOT drive yourself home after surgery.    Do not drive, operate machinery, make critical decisions or do activities that require co-ordination or balance until after a night’s sleep.   Do not drink alcoholic beverages for 24 hours.   Instructions for resuming your medications will be provided by your surgeon.    CALL YOUR DOCTOR AFTER SURGERY IF YOU HAVE:     Chills and/or a fever of 101° F or higher.    Redness, swelling, pus or drainage from your surgical wound or a bad smell from the wound.    Lightheadedness, fainting or confusion.    Persistent  vomiting (throwing up) and are not able to eat or drink for 12 hours.    Three or more loose, watery bowel movements in 24 hours (diarrhea).   Difficulty or pain while urinating( after non-urological surgery)    Pain and swelling in your legs, especially if it is only on one side.    Difficulty breathing or are breathing faster than normal.    Any new concerning symptoms.

## 2024-02-25 LAB
ATRIAL RATE: 73 BPM
P AXIS: 71 DEGREES
P OFFSET: 168 MS
P ONSET: 121 MS
PR INTERVAL: 206 MS
Q ONSET: 224 MS
QRS COUNT: 12 BEATS
QRS DURATION: 140 MS
QT INTERVAL: 400 MS
QTC CALCULATION(BAZETT): 440 MS
QTC FREDERICIA: 427 MS
R AXIS: 62 DEGREES
T AXIS: 32 DEGREES
T OFFSET: 424 MS
VENTRICULAR RATE: 73 BPM

## 2024-02-26 ENCOUNTER — ANESTHESIA EVENT (OUTPATIENT)
Dept: OPERATING ROOM | Facility: HOSPITAL | Age: 84
End: 2024-02-26
Payer: MEDICARE

## 2024-02-26 ENCOUNTER — ANESTHESIA (OUTPATIENT)
Dept: OPERATING ROOM | Facility: HOSPITAL | Age: 84
End: 2024-02-26
Payer: MEDICARE

## 2024-02-26 ENCOUNTER — HOSPITAL ENCOUNTER (OUTPATIENT)
Facility: HOSPITAL | Age: 84
Discharge: HOME | End: 2024-02-27
Attending: OBSTETRICS & GYNECOLOGY | Admitting: OBSTETRICS & GYNECOLOGY
Payer: MEDICARE

## 2024-02-26 DIAGNOSIS — N84.0 ENDOMETRIAL POLYP: ICD-10-CM

## 2024-02-26 DIAGNOSIS — K59.00 CONSTIPATION, UNSPECIFIED CONSTIPATION TYPE: ICD-10-CM

## 2024-02-26 DIAGNOSIS — G89.18 POST-OP PAIN: Primary | ICD-10-CM

## 2024-02-26 DIAGNOSIS — N39.3 STRESS INCONTINENCE: ICD-10-CM

## 2024-02-26 DIAGNOSIS — N81.10 POP-Q STAGE 4 CYSTOCELE: ICD-10-CM

## 2024-02-26 PROBLEM — Z48.89 ENCOUNTER FOR POSTOPERATIVE CARE: Status: ACTIVE | Noted: 2024-02-26

## 2024-02-26 LAB — GLUCOSE BLD MANUAL STRIP-MCNC: 140 MG/DL (ref 74–99)

## 2024-02-26 PROCEDURE — 88112 CYTOPATH CELL ENHANCE TECH: CPT | Mod: TC | Performed by: OBSTETRICS & GYNECOLOGY

## 2024-02-26 PROCEDURE — 99238 HOSP IP/OBS DSCHRG MGMT 30/<: CPT | Performed by: OBSTETRICS & GYNECOLOGY

## 2024-02-26 PROCEDURE — 7100000011 HC EXTENDED STAY RECOVERY HOURLY - NURSING UNIT

## 2024-02-26 PROCEDURE — 2500000004 HC RX 250 GENERAL PHARMACY W/ HCPCS (ALT 636 FOR OP/ED): Performed by: OBSTETRICS & GYNECOLOGY

## 2024-02-26 PROCEDURE — 7100000002 HC RECOVERY ROOM TIME - EACH INCREMENTAL 1 MINUTE: Performed by: OBSTETRICS & GYNECOLOGY

## 2024-02-26 PROCEDURE — C1771 REP DEV, URINARY, W/SLING: HCPCS | Performed by: OBSTETRICS & GYNECOLOGY

## 2024-02-26 PROCEDURE — 88305 TISSUE EXAM BY PATHOLOGIST: CPT | Mod: TC | Performed by: OBSTETRICS & GYNECOLOGY

## 2024-02-26 PROCEDURE — 2500000001 HC RX 250 WO HCPCS SELF ADMINISTERED DRUGS (ALT 637 FOR MEDICARE OP): Performed by: OBSTETRICS & GYNECOLOGY

## 2024-02-26 PROCEDURE — 57120 COLPOCLEISIS LE FORT TYPE: CPT | Performed by: OBSTETRICS & GYNECOLOGY

## 2024-02-26 PROCEDURE — 88112 CYTOPATH CELL ENHANCE TECH: CPT | Performed by: PATHOLOGY

## 2024-02-26 PROCEDURE — 58558 HYSTEROSCOPY BIOPSY: CPT

## 2024-02-26 PROCEDURE — 96372 THER/PROPH/DIAG INJ SC/IM: CPT | Performed by: OBSTETRICS & GYNECOLOGY

## 2024-02-26 PROCEDURE — A4649 SURGICAL SUPPLIES: HCPCS | Performed by: OBSTETRICS & GYNECOLOGY

## 2024-02-26 PROCEDURE — 2720000007 HC OR 272 NO HCPCS: Performed by: OBSTETRICS & GYNECOLOGY

## 2024-02-26 PROCEDURE — 3600000008 HC OR TIME - EACH INCREMENTAL 1 MINUTE - PROCEDURE LEVEL THREE: Performed by: OBSTETRICS & GYNECOLOGY

## 2024-02-26 PROCEDURE — A57288 PR SLING OPER STRES INCONTINENCE: Performed by: ANESTHESIOLOGY

## 2024-02-26 PROCEDURE — 7100000001 HC RECOVERY ROOM TIME - INITIAL BASE CHARGE: Performed by: OBSTETRICS & GYNECOLOGY

## 2024-02-26 PROCEDURE — 57120 COLPOCLEISIS LE FORT TYPE: CPT

## 2024-02-26 PROCEDURE — 57250 REPAIR RECTUM & VAGINA: CPT | Performed by: OBSTETRICS & GYNECOLOGY

## 2024-02-26 PROCEDURE — 57250 REPAIR RECTUM & VAGINA: CPT

## 2024-02-26 PROCEDURE — 3600000003 HC OR TIME - INITIAL BASE CHARGE - PROCEDURE LEVEL THREE: Performed by: OBSTETRICS & GYNECOLOGY

## 2024-02-26 PROCEDURE — 58558 HYSTEROSCOPY BIOPSY: CPT | Performed by: OBSTETRICS & GYNECOLOGY

## 2024-02-26 PROCEDURE — 3700000001 HC GENERAL ANESTHESIA TIME - INITIAL BASE CHARGE: Performed by: OBSTETRICS & GYNECOLOGY

## 2024-02-26 PROCEDURE — 2500000005 HC RX 250 GENERAL PHARMACY W/O HCPCS: Performed by: ANESTHESIOLOGY

## 2024-02-26 PROCEDURE — 3700000002 HC GENERAL ANESTHESIA TIME - EACH INCREMENTAL 1 MINUTE: Performed by: OBSTETRICS & GYNECOLOGY

## 2024-02-26 PROCEDURE — 88305 TISSUE EXAM BY PATHOLOGIST: CPT | Performed by: PATHOLOGY

## 2024-02-26 PROCEDURE — 2780000003 HC OR 278 NO HCPCS: Performed by: OBSTETRICS & GYNECOLOGY

## 2024-02-26 PROCEDURE — 57288 REPAIR BLADDER DEFECT: CPT

## 2024-02-26 PROCEDURE — 82947 ASSAY GLUCOSE BLOOD QUANT: CPT

## 2024-02-26 PROCEDURE — 2500000004 HC RX 250 GENERAL PHARMACY W/ HCPCS (ALT 636 FOR OP/ED): Performed by: ANESTHESIOLOGY

## 2024-02-26 PROCEDURE — 57288 REPAIR BLADDER DEFECT: CPT | Performed by: OBSTETRICS & GYNECOLOGY

## 2024-02-26 PROCEDURE — 99100 ANES PT EXTEME AGE<1 YR&>70: CPT | Performed by: ANESTHESIOLOGY

## 2024-02-26 DEVICE — SLING, DESARA, BLUE TV, WITH 2.7 INTRODUCER: Type: IMPLANTABLE DEVICE | Site: VAGINA | Status: FUNCTIONAL

## 2024-02-26 RX ORDER — IBUPROFEN 600 MG/1
600 TABLET ORAL EVERY 6 HOURS
Status: DISCONTINUED | OUTPATIENT
Start: 2024-02-27 | End: 2024-02-27 | Stop reason: HOSPADM

## 2024-02-26 RX ORDER — PROPOFOL 10 MG/ML
INJECTION, EMULSION INTRAVENOUS AS NEEDED
Status: DISCONTINUED | OUTPATIENT
Start: 2024-02-26 | End: 2024-02-26

## 2024-02-26 RX ORDER — NALOXONE HYDROCHLORIDE 0.4 MG/ML
0.1 INJECTION, SOLUTION INTRAMUSCULAR; INTRAVENOUS; SUBCUTANEOUS EVERY 5 MIN PRN
Status: DISCONTINUED | OUTPATIENT
Start: 2024-02-26 | End: 2024-02-27 | Stop reason: HOSPADM

## 2024-02-26 RX ORDER — ONDANSETRON HYDROCHLORIDE 2 MG/ML
4 INJECTION, SOLUTION INTRAVENOUS ONCE AS NEEDED
Status: COMPLETED | OUTPATIENT
Start: 2024-02-26 | End: 2024-02-26

## 2024-02-26 RX ORDER — MIDAZOLAM HYDROCHLORIDE 1 MG/ML
1 INJECTION, SOLUTION INTRAMUSCULAR; INTRAVENOUS ONCE AS NEEDED
Status: DISCONTINUED | OUTPATIENT
Start: 2024-02-26 | End: 2024-02-26 | Stop reason: HOSPADM

## 2024-02-26 RX ORDER — OXYCODONE HYDROCHLORIDE 5 MG/1
10 TABLET ORAL EVERY 4 HOURS PRN
Status: DISCONTINUED | OUTPATIENT
Start: 2024-02-26 | End: 2024-02-27 | Stop reason: HOSPADM

## 2024-02-26 RX ORDER — FENTANYL CITRATE 50 UG/ML
INJECTION, SOLUTION INTRAMUSCULAR; INTRAVENOUS AS NEEDED
Status: DISCONTINUED | OUTPATIENT
Start: 2024-02-26 | End: 2024-02-26

## 2024-02-26 RX ORDER — SODIUM CHLORIDE, SODIUM LACTATE, POTASSIUM CHLORIDE, CALCIUM CHLORIDE 600; 310; 30; 20 MG/100ML; MG/100ML; MG/100ML; MG/100ML
40 INJECTION, SOLUTION INTRAVENOUS CONTINUOUS
Status: DISCONTINUED | OUTPATIENT
Start: 2024-02-26 | End: 2024-02-27 | Stop reason: HOSPADM

## 2024-02-26 RX ORDER — LIDOCAINE HYDROCHLORIDE 10 MG/ML
INJECTION INFILTRATION; PERINEURAL AS NEEDED
Status: DISCONTINUED | OUTPATIENT
Start: 2024-02-26 | End: 2024-02-26

## 2024-02-26 RX ORDER — LIDOCAINE HYDROCHLORIDE 10 MG/ML
0.1 INJECTION INFILTRATION; PERINEURAL ONCE
Status: DISCONTINUED | OUTPATIENT
Start: 2024-02-26 | End: 2024-02-26 | Stop reason: HOSPADM

## 2024-02-26 RX ORDER — ONDANSETRON HYDROCHLORIDE 2 MG/ML
INJECTION, SOLUTION INTRAVENOUS AS NEEDED
Status: DISCONTINUED | OUTPATIENT
Start: 2024-02-26 | End: 2024-02-26

## 2024-02-26 RX ORDER — PHENAZOPYRIDINE HYDROCHLORIDE 100 MG/1
200 TABLET, FILM COATED ORAL ONCE
Status: COMPLETED | OUTPATIENT
Start: 2024-02-26 | End: 2024-02-26

## 2024-02-26 RX ORDER — IBUPROFEN 600 MG/1
600 TABLET ORAL EVERY 6 HOURS PRN
Qty: 30 TABLET | Refills: 0 | Status: SHIPPED | OUTPATIENT
Start: 2024-02-26

## 2024-02-26 RX ORDER — OXYCODONE HYDROCHLORIDE 5 MG/1
5 TABLET ORAL EVERY 6 HOURS PRN
Qty: 10 TABLET | Refills: 0 | Status: SHIPPED | OUTPATIENT
Start: 2024-02-26 | End: 2024-03-02

## 2024-02-26 RX ORDER — ROCURONIUM BROMIDE 10 MG/ML
INJECTION, SOLUTION INTRAVENOUS AS NEEDED
Status: DISCONTINUED | OUTPATIENT
Start: 2024-02-26 | End: 2024-02-26

## 2024-02-26 RX ORDER — CEFAZOLIN SODIUM 2 G/100ML
2 INJECTION, SOLUTION INTRAVENOUS ONCE
Status: DISCONTINUED | OUTPATIENT
Start: 2024-02-26 | End: 2024-02-26 | Stop reason: HOSPADM

## 2024-02-26 RX ORDER — SODIUM CHLORIDE, SODIUM LACTATE, POTASSIUM CHLORIDE, CALCIUM CHLORIDE 600; 310; 30; 20 MG/100ML; MG/100ML; MG/100ML; MG/100ML
INJECTION, SOLUTION INTRAVENOUS CONTINUOUS PRN
Status: DISCONTINUED | OUTPATIENT
Start: 2024-02-26 | End: 2024-02-26

## 2024-02-26 RX ORDER — HEPARIN SODIUM 5000 [USP'U]/ML
5000 INJECTION, SOLUTION INTRAVENOUS; SUBCUTANEOUS EVERY 8 HOURS
Status: DISCONTINUED | OUTPATIENT
Start: 2024-02-27 | End: 2024-02-27 | Stop reason: HOSPADM

## 2024-02-26 RX ORDER — FENTANYL CITRATE 50 UG/ML
50 INJECTION, SOLUTION INTRAMUSCULAR; INTRAVENOUS EVERY 5 MIN PRN
Status: DISCONTINUED | OUTPATIENT
Start: 2024-02-26 | End: 2024-02-26 | Stop reason: HOSPADM

## 2024-02-26 RX ORDER — ACETAMINOPHEN 325 MG/1
975 TABLET ORAL ONCE
Status: COMPLETED | OUTPATIENT
Start: 2024-02-26 | End: 2024-02-26

## 2024-02-26 RX ORDER — SIMETHICONE 80 MG
80 TABLET,CHEWABLE ORAL 4 TIMES DAILY PRN
Status: DISCONTINUED | OUTPATIENT
Start: 2024-02-26 | End: 2024-02-27 | Stop reason: HOSPADM

## 2024-02-26 RX ORDER — POLYETHYLENE GLYCOL 3350 17 G/17G
17 POWDER, FOR SOLUTION ORAL DAILY
Status: DISCONTINUED | OUTPATIENT
Start: 2024-02-26 | End: 2024-02-27 | Stop reason: HOSPADM

## 2024-02-26 RX ORDER — MEPERIDINE HYDROCHLORIDE 25 MG/ML
12.5 INJECTION INTRAMUSCULAR; INTRAVENOUS; SUBCUTANEOUS EVERY 10 MIN PRN
Status: DISCONTINUED | OUTPATIENT
Start: 2024-02-26 | End: 2024-02-26 | Stop reason: HOSPADM

## 2024-02-26 RX ORDER — AMOXICILLIN 250 MG
1 CAPSULE ORAL 2 TIMES DAILY PRN
Status: DISCONTINUED | OUTPATIENT
Start: 2024-02-26 | End: 2024-02-27 | Stop reason: HOSPADM

## 2024-02-26 RX ORDER — CEFAZOLIN 1 G/1
INJECTION, POWDER, FOR SOLUTION INTRAVENOUS AS NEEDED
Status: DISCONTINUED | OUTPATIENT
Start: 2024-02-26 | End: 2024-02-26

## 2024-02-26 RX ORDER — HEPARIN SODIUM 5000 [USP'U]/ML
5000 INJECTION, SOLUTION INTRAVENOUS; SUBCUTANEOUS ONCE
Status: COMPLETED | OUTPATIENT
Start: 2024-02-26 | End: 2024-02-26

## 2024-02-26 RX ORDER — ONDANSETRON HYDROCHLORIDE 2 MG/ML
4 INJECTION, SOLUTION INTRAVENOUS EVERY 6 HOURS PRN
Status: DISCONTINUED | OUTPATIENT
Start: 2024-02-26 | End: 2024-02-27 | Stop reason: HOSPADM

## 2024-02-26 RX ORDER — ALBUTEROL SULFATE 0.83 MG/ML
2.5 SOLUTION RESPIRATORY (INHALATION) ONCE
Status: DISCONTINUED | OUTPATIENT
Start: 2024-02-26 | End: 2024-02-26 | Stop reason: HOSPADM

## 2024-02-26 RX ORDER — ACETAMINOPHEN 325 MG/1
975 TABLET ORAL EVERY 6 HOURS
Status: DISCONTINUED | OUTPATIENT
Start: 2024-02-26 | End: 2024-02-27 | Stop reason: HOSPADM

## 2024-02-26 RX ORDER — ACETAMINOPHEN 500 MG
1000 TABLET ORAL EVERY 6 HOURS PRN
Qty: 30 TABLET | Refills: 0 | Status: SHIPPED | OUTPATIENT
Start: 2024-02-26

## 2024-02-26 RX ORDER — SODIUM CHLORIDE, SODIUM LACTATE, POTASSIUM CHLORIDE, CALCIUM CHLORIDE 600; 310; 30; 20 MG/100ML; MG/100ML; MG/100ML; MG/100ML
100 INJECTION, SOLUTION INTRAVENOUS CONTINUOUS
Status: DISCONTINUED | OUTPATIENT
Start: 2024-02-26 | End: 2024-02-26 | Stop reason: HOSPADM

## 2024-02-26 RX ORDER — KETOROLAC TROMETHAMINE 30 MG/ML
15 INJECTION, SOLUTION INTRAMUSCULAR; INTRAVENOUS EVERY 6 HOURS
Status: DISCONTINUED | OUTPATIENT
Start: 2024-02-26 | End: 2024-02-27 | Stop reason: HOSPADM

## 2024-02-26 RX ORDER — OXYCODONE HYDROCHLORIDE 5 MG/1
5 TABLET ORAL EVERY 4 HOURS PRN
Status: DISCONTINUED | OUTPATIENT
Start: 2024-02-26 | End: 2024-02-27 | Stop reason: HOSPADM

## 2024-02-26 RX ORDER — POLYETHYLENE GLYCOL 3350 17 G/17G
17 POWDER, FOR SOLUTION ORAL DAILY
Qty: 527 G | Refills: 0 | Status: SHIPPED | OUTPATIENT
Start: 2024-02-26

## 2024-02-26 RX ORDER — VASOPRESSIN 20 U/ML
INJECTION PARENTERAL CONTINUOUS PRN
Status: COMPLETED | OUTPATIENT
Start: 2024-02-26 | End: 2024-02-26

## 2024-02-26 RX ADMIN — POLYETHYLENE GLYCOL 3350 17 G: 17 POWDER, FOR SOLUTION ORAL at 18:05

## 2024-02-26 RX ADMIN — ONDANSETRON HYDROCHLORIDE 4 MG: 2 INJECTION INTRAMUSCULAR; INTRAVENOUS at 12:14

## 2024-02-26 RX ADMIN — HYDROMORPHONE HYDROCHLORIDE 0.2 MG: 0.2 INJECTION, SOLUTION INTRAMUSCULAR; INTRAVENOUS; SUBCUTANEOUS at 14:59

## 2024-02-26 RX ADMIN — ROCURONIUM BROMIDE 15 MG: 10 INJECTION, SOLUTION INTRAVENOUS at 10:57

## 2024-02-26 RX ADMIN — HYDROMORPHONE HYDROCHLORIDE 0.2 MG: 0.2 INJECTION, SOLUTION INTRAMUSCULAR; INTRAVENOUS; SUBCUTANEOUS at 15:16

## 2024-02-26 RX ADMIN — ONDANSETRON HYDROCHLORIDE 4 MG: 2 INJECTION INTRAMUSCULAR; INTRAVENOUS at 13:32

## 2024-02-26 RX ADMIN — FENTANYL CITRATE 25 MCG: 0.05 INJECTION, SOLUTION INTRAMUSCULAR; INTRAVENOUS at 11:05

## 2024-02-26 RX ADMIN — PROPOFOL 100 MG: 10 INJECTION, EMULSION INTRAVENOUS at 10:44

## 2024-02-26 RX ADMIN — FENTANYL CITRATE 25 MCG: 0.05 INJECTION, SOLUTION INTRAMUSCULAR; INTRAVENOUS at 13:14

## 2024-02-26 RX ADMIN — SODIUM CHLORIDE, POTASSIUM CHLORIDE, SODIUM LACTATE AND CALCIUM CHLORIDE: 600; 310; 30; 20 INJECTION, SOLUTION INTRAVENOUS at 11:45

## 2024-02-26 RX ADMIN — SODIUM CHLORIDE, POTASSIUM CHLORIDE, SODIUM LACTATE AND CALCIUM CHLORIDE: 600; 310; 30; 20 INJECTION, SOLUTION INTRAVENOUS at 10:41

## 2024-02-26 RX ADMIN — SODIUM CHLORIDE, SODIUM LACTATE, POTASSIUM CHLORIDE, AND CALCIUM CHLORIDE 40 ML/HR: 600; 310; 30; 20 INJECTION, SOLUTION INTRAVENOUS at 17:47

## 2024-02-26 RX ADMIN — FENTANYL CITRATE 50 MCG: 0.05 INJECTION, SOLUTION INTRAMUSCULAR; INTRAVENOUS at 10:44

## 2024-02-26 RX ADMIN — FENTANYL CITRATE 25 MCG: 0.05 INJECTION, SOLUTION INTRAMUSCULAR; INTRAVENOUS at 12:40

## 2024-02-26 RX ADMIN — FENTANYL CITRATE 25 MCG: 0.05 INJECTION, SOLUTION INTRAMUSCULAR; INTRAVENOUS at 11:06

## 2024-02-26 RX ADMIN — CEFAZOLIN 2 G: 330 INJECTION, POWDER, FOR SOLUTION INTRAMUSCULAR; INTRAVENOUS at 10:48

## 2024-02-26 RX ADMIN — ONDANSETRON 4 MG: 2 INJECTION INTRAMUSCULAR; INTRAVENOUS at 14:52

## 2024-02-26 RX ADMIN — LIDOCAINE HYDROCHLORIDE 4 ML: 10 INJECTION, SOLUTION INFILTRATION; PERINEURAL at 10:44

## 2024-02-26 RX ADMIN — ROCURONIUM BROMIDE 10 MG: 10 INJECTION, SOLUTION INTRAVENOUS at 11:40

## 2024-02-26 RX ADMIN — ACETAMINOPHEN 975 MG: 325 TABLET ORAL at 09:21

## 2024-02-26 RX ADMIN — ROCURONIUM BROMIDE 25 MG: 10 INJECTION, SOLUTION INTRAVENOUS at 10:44

## 2024-02-26 RX ADMIN — HEPARIN SODIUM 5000 UNITS: 5000 INJECTION, SOLUTION INTRAVENOUS; SUBCUTANEOUS at 09:32

## 2024-02-26 RX ADMIN — ACETAMINOPHEN 975 MG: 325 TABLET ORAL at 18:06

## 2024-02-26 RX ADMIN — ROCURONIUM BROMIDE 15 MG: 10 INJECTION, SOLUTION INTRAVENOUS at 11:56

## 2024-02-26 RX ADMIN — PHENAZOPYRIDINE 200 MG: 100 TABLET ORAL at 09:22

## 2024-02-26 RX ADMIN — SUGAMMADEX 200 MG: 100 INJECTION, SOLUTION INTRAVENOUS at 13:46

## 2024-02-26 RX ADMIN — HYDROMORPHONE HYDROCHLORIDE 0.2 MG: 0.2 INJECTION, SOLUTION INTRAMUSCULAR; INTRAVENOUS; SUBCUTANEOUS at 15:28

## 2024-02-26 SDOH — ECONOMIC STABILITY: TRANSPORTATION INSECURITY
IN THE PAST 12 MONTHS, HAS LACK OF TRANSPORTATION KEPT YOU FROM MEETINGS, WORK, OR FROM GETTING THINGS NEEDED FOR DAILY LIVING?: PATIENT DECLINED

## 2024-02-26 SDOH — SOCIAL STABILITY: SOCIAL INSECURITY: DOES ANYONE TRY TO KEEP YOU FROM HAVING/CONTACTING OTHER FRIENDS OR DOING THINGS OUTSIDE YOUR HOME?: NO

## 2024-02-26 SDOH — ECONOMIC STABILITY: TRANSPORTATION INSECURITY
IN THE PAST 12 MONTHS, HAS THE LACK OF TRANSPORTATION KEPT YOU FROM MEDICAL APPOINTMENTS OR FROM GETTING MEDICATIONS?: PATIENT DECLINED

## 2024-02-26 SDOH — SOCIAL STABILITY: SOCIAL INSECURITY: WERE YOU ABLE TO COMPLETE ALL THE BEHAVIORAL HEALTH SCREENINGS?: NO

## 2024-02-26 SDOH — ECONOMIC STABILITY: INCOME INSECURITY: HOW HARD IS IT FOR YOU TO PAY FOR THE VERY BASICS LIKE FOOD, HOUSING, MEDICAL CARE, AND HEATING?: PATIENT DECLINED

## 2024-02-26 SDOH — SOCIAL STABILITY: SOCIAL INSECURITY: HAVE YOU HAD THOUGHTS OF HARMING ANYONE ELSE?: NO

## 2024-02-26 SDOH — SOCIAL STABILITY: SOCIAL INSECURITY: ARE YOU OR HAVE YOU BEEN THREATENED OR ABUSED PHYSICALLY, EMOTIONALLY, OR SEXUALLY BY ANYONE?: NO

## 2024-02-26 SDOH — SOCIAL STABILITY: SOCIAL INSECURITY: ABUSE: ADULT

## 2024-02-26 SDOH — ECONOMIC STABILITY: INCOME INSECURITY: IN THE LAST 12 MONTHS, WAS THERE A TIME WHEN YOU WERE NOT ABLE TO PAY THE MORTGAGE OR RENT ON TIME?: PATIENT DECLINED

## 2024-02-26 SDOH — SOCIAL STABILITY: SOCIAL INSECURITY: DO YOU FEEL UNSAFE GOING BACK TO THE PLACE WHERE YOU ARE LIVING?: NO

## 2024-02-26 SDOH — SOCIAL STABILITY: SOCIAL INSECURITY: DO YOU FEEL ANYONE HAS EXPLOITED OR TAKEN ADVANTAGE OF YOU FINANCIALLY OR OF YOUR PERSONAL PROPERTY?: NO

## 2024-02-26 SDOH — ECONOMIC STABILITY: HOUSING INSECURITY
IN THE LAST 12 MONTHS, WAS THERE A TIME WHEN YOU DID NOT HAVE A STEADY PLACE TO SLEEP OR SLEPT IN A SHELTER (INCLUDING NOW)?: PATIENT DECLINED

## 2024-02-26 SDOH — SOCIAL STABILITY: SOCIAL INSECURITY: HAS ANYONE EVER THREATENED TO HURT YOUR FAMILY OR YOUR PETS?: NO

## 2024-02-26 SDOH — SOCIAL STABILITY: SOCIAL INSECURITY: ARE THERE ANY APPARENT SIGNS OF INJURIES/BEHAVIORS THAT COULD BE RELATED TO ABUSE/NEGLECT?: NO

## 2024-02-26 ASSESSMENT — ACTIVITIES OF DAILY LIVING (ADL)
HEARING - RIGHT EAR: FUNCTIONAL
ADEQUATE_TO_COMPLETE_ADL: YES
WALKS IN HOME: INDEPENDENT
HEARING - LEFT EAR: FUNCTIONAL
JUDGMENT_ADEQUATE_SAFELY_COMPLETE_DAILY_ACTIVITIES: YES
ASSISTIVE_DEVICE: WALKER
BATHING: NEEDS ASSISTANCE
DRESSING YOURSELF: INDEPENDENT
PATIENT'S MEMORY ADEQUATE TO SAFELY COMPLETE DAILY ACTIVITIES?: YES
GROOMING: NEEDS ASSISTANCE
TOILETING: NEEDS ASSISTANCE
FEEDING YOURSELF: INDEPENDENT

## 2024-02-26 ASSESSMENT — LIFESTYLE VARIABLES
AUDIT-C TOTAL SCORE: 0
HOW MANY STANDARD DRINKS CONTAINING ALCOHOL DO YOU HAVE ON A TYPICAL DAY: PATIENT DOES NOT DRINK
HOW OFTEN DO YOU HAVE A DRINK CONTAINING ALCOHOL: NEVER
PRESCIPTION_ABUSE_PAST_12_MONTHS: NO
SKIP TO QUESTIONS 9-10: 1
HOW OFTEN DO YOU HAVE 6 OR MORE DRINKS ON ONE OCCASION: NEVER
AUDIT-C TOTAL SCORE: 0
SUBSTANCE_ABUSE_PAST_12_MONTHS: NO

## 2024-02-26 ASSESSMENT — PAIN DESCRIPTION - DESCRIPTORS
DESCRIPTORS: CRAMPING
DESCRIPTORS: CRAMPING

## 2024-02-26 ASSESSMENT — COGNITIVE AND FUNCTIONAL STATUS - GENERAL
PATIENT BASELINE BEDBOUND: NO
CLIMB 3 TO 5 STEPS WITH RAILING: A LITTLE
STANDING UP FROM CHAIR USING ARMS: A LITTLE
DAILY ACTIVITIY SCORE: 24
MOBILITY SCORE: 21
WALKING IN HOSPITAL ROOM: A LITTLE

## 2024-02-26 ASSESSMENT — PAIN - FUNCTIONAL ASSESSMENT
PAIN_FUNCTIONAL_ASSESSMENT: 0-10
PAIN_FUNCTIONAL_ASSESSMENT: 0-10
PAIN_FUNCTIONAL_ASSESSMENT: FLACC (FACE, LEGS, ACTIVITY, CRY, CONSOLABILITY)
PAIN_FUNCTIONAL_ASSESSMENT: 0-10

## 2024-02-26 ASSESSMENT — PATIENT HEALTH QUESTIONNAIRE - PHQ9
2. FEELING DOWN, DEPRESSED OR HOPELESS: NOT AT ALL
SUM OF ALL RESPONSES TO PHQ9 QUESTIONS 1 & 2: 0
1. LITTLE INTEREST OR PLEASURE IN DOING THINGS: NOT AT ALL

## 2024-02-26 ASSESSMENT — PAIN SCALES - GENERAL
PAINLEVEL_OUTOF10: 0 - NO PAIN
PAINLEVEL_OUTOF10: 2
PAINLEVEL_OUTOF10: 6
PAINLEVEL_OUTOF10: 4
PAINLEVEL_OUTOF10: 5 - MODERATE PAIN
PAINLEVEL_OUTOF10: 2
PAINLEVEL_OUTOF10: 5 - MODERATE PAIN
PAINLEVEL_OUTOF10: 3

## 2024-02-26 ASSESSMENT — COLUMBIA-SUICIDE SEVERITY RATING SCALE - C-SSRS
2. HAVE YOU ACTUALLY HAD ANY THOUGHTS OF KILLING YOURSELF?: NO
6. HAVE YOU EVER DONE ANYTHING, STARTED TO DO ANYTHING, OR PREPARED TO DO ANYTHING TO END YOUR LIFE?: NO
6. HAVE YOU EVER DONE ANYTHING, STARTED TO DO ANYTHING, OR PREPARED TO DO ANYTHING TO END YOUR LIFE?: NO
1. IN THE PAST MONTH, HAVE YOU WISHED YOU WERE DEAD OR WISHED YOU COULD GO TO SLEEP AND NOT WAKE UP?: NO
2. HAVE YOU ACTUALLY HAD ANY THOUGHTS OF KILLING YOURSELF?: NO
1. IN THE PAST MONTH, HAVE YOU WISHED YOU WERE DEAD OR WISHED YOU COULD GO TO SLEEP AND NOT WAKE UP?: NO

## 2024-02-26 ASSESSMENT — PAIN DESCRIPTION - ORIENTATION: ORIENTATION: INNER

## 2024-02-26 ASSESSMENT — PAIN DESCRIPTION - LOCATION
LOCATION: VAGINA

## 2024-02-26 NOTE — ANESTHESIA PREPROCEDURE EVALUATION
Patient: Deisi Barkley    Procedure Information       Date/Time: 02/26/24 1000    Procedures:       Colpocleisis - myosure or aveta      Posterior repair with perineorrhaphy      Midurethral sling, cystoscopy      Dilatation and Curettage with MyoSure    [facility]: [facility] / [facility]    Surgeons: Lynnette Son MD MPH            Relevant Problems   Anesthesia (within normal limits)      Cardiovascular   (+) Fibromuscular dysplasia of carotid artery (CMS/HCC)      Endocrine   (+) Subclinical hypothyroidism      /Renal   (+) UTI (urinary tract infection)      Neuro/Psych   (+) Anxiety   (+) Fibromuscular dysplasia of carotid artery (CMS/HCC)      Hematology   (+) Anemia      Musculoskeletal   (+) Primary osteoarthritis involving multiple joints      Infectious Disease   (+) UTI (urinary tract infection)       Clinical information reviewed:   Tobacco  Allergies  Meds   Med Hx  Surg Hx  OB Status  Fam Hx  Soc   Hx        NPO Detail:  NPO/Void Status  Date of Last Liquid: 01/29/24  Time of Last Liquid: 1800  Date of Last Solid: 01/29/24  Time of Last Solid: 1800  Time of Last Void: 0910         Physical Exam    Airway  Mallampati: II  TM distance: >3 FB     Cardiovascular    Dental - normal exam     Pulmonary    Abdominal            Anesthesia Plan    History of general anesthesia?: yes  History of complications of general anesthesia?: no    ASA 2     general     intravenous induction   Anesthetic plan and risks discussed with patient.

## 2024-02-26 NOTE — ANESTHESIA POSTPROCEDURE EVALUATION
Patient: Deisi Barkley    Procedure Summary       Date: 02/26/24 Room / Location: [facility]    Anesthesia Start: 1043 Anesthesia Stop: 1359    Procedures:       Colpocleisis (Vagina )      Posterior repair with perineorrhaphy (Vagina )      Midurethral sling, cystoscopy (Vagina )      Dilatation and Curettage with MyoSure (Vagina ) Diagnosis:       POP-Q stage 4 cystocele      Stress incontinence      Endometrial polyp      (POP-Q stage 4 cystocele [N81.10])      (Stress incontinence [N39.3])      (Endometrial polyp [N84.0])    Surgeons: Lynnette Son MD MPH Responsible Provider: Veto Velarde DO    Anesthesia Type: general ASA Status: 2            Anesthesia Type: general    Vitals Value Taken Time   /53 02/26/24 1402   Temp 36.4 02/26/24 1402   Pulse 62 02/26/24 1402   Resp 14 02/26/24 1402   SpO2 99 % 02/26/24 1402   Vitals shown include unvalidated device data.    Anesthesia Post Evaluation    Patient location during evaluation: PACU  Patient participation: complete - patient participated  Level of consciousness: awake  Pain management: adequate  Airway patency: patent  Cardiovascular status: acceptable  Respiratory status: acceptable  Hydration status: acceptable  Postoperative Nausea and Vomiting: none        There were no known notable events for this encounter.

## 2024-02-26 NOTE — ANESTHESIA PROCEDURE NOTES
Airway  Date/Time: 2/26/2024 10:44 AM  Urgency: elective    Airway not difficult    Staffing  Performed: [facility]   Authorized by: Veto Vlearde DO    Performed by: Veto Velarde DO  Patient location during procedure: OR    Indications and Patient Condition  Indications for airway management: anesthesia and airway protection  Spontaneous ventilation: present  Sedation level: deep  Preoxygenated: yes  Patient position: sniffing  Mask difficulty assessment: 1 - vent by mask  Planned trial extubation    Final Airway Details  Final airway type: endotracheal airway      Successful airway: ETT  Cuffed: yes   Successful intubation technique: direct laryngoscopy  Blade: Cindy  Blade size: #3  ETT size (mm): 7.0  Placement verified by: chest auscultation and capnometry

## 2024-02-26 NOTE — PROGRESS NOTES
Deisi Barkley is a 83 y.o. female who is POD #0 s/p dilation and curettage, colpocleisis, posterior repair, mid-urethral sling and cystoscopy.  mL.    Subjective   Patient reports feeling tired and having pain in PACU. She feels too weak to go home and requests to stay overnight.     Objective     Physical Exam  Constitutional:       Appearance: Normal appearance.      Comments: Appears tired but is conversational   Cardiovascular:      Rate and Rhythm: Normal rate.      Pulses: Normal pulses.   Pulmonary:      Effort: Pulmonary effort is normal.   Abdominal:      General: Abdomen is flat. There is no distension.      Palpations: Abdomen is soft.      Tenderness: There is no abdominal tenderness. There is no guarding.   Skin:     General: Skin is warm and dry.   Neurological:      General: No focal deficit present.      Mental Status: She is alert and oriented to person, place, and time. Mental status is at baseline.   Psychiatric:         Mood and Affect: Mood normal.         Behavior: Behavior normal.         Last Recorded Vitals  Blood pressure 98/50, pulse 69, temperature 36.1 °C (97 °F), temperature source Temporal, resp. rate 14, SpO2 98 %.  Intake/Output last 3 Shifts:  No intake/output data recorded.      Assessment/Plan   Principal Problem:    Encounter for postoperative care  Active Problems:    POP-Q stage 4 cystocele    Stress incontinence    Endometrial polyp    Routine post-op care  - plan for extended recovery overnight  - pain control with scheduled Toradol/Tylenol with oxycodone for breakthrough  - general diet  - coats in place, plan active void trial in the AM  - ambulate overnight  - DVT prophylaxis: SCDs, heparin tomorrow  - constipation prophylaxis: Miralax   - CBC in the AM    Dr. Adelaida Chandra MD  Urogynecology and Reconstructive Pelvic Surgery Fellow  2/26/2024 4:35 PM

## 2024-02-26 NOTE — OP NOTE
Colpocleisis, Posterior repair with perineorrhaphy, Midurethral sling, cystoscopy, Dilatation and Curettage with MyoSure Operative Note     Date: 2024  OR Location: [facility]    Name: Deisi Barkley, : 1940, Age: 83 y.o., MRN: [id number], Sex: female    Diagnosis  Pre-op Diagnosis     * POP-Q stage 4 cystocele [N81.10]     * Stress incontinence [N39.3]     * Endometrial polyp [N84.0] Post-op Diagnosis     * POP-Q stage 4 cystocele [N81.10]     * Stress incontinence [N39.3]     * Endometrial polyp [N84.0]     Procedures  Lefort Folpocleisis,  Posterior repair  \Mid urethral sling  Hysteroscopy and dilation and curettage with myosure    Surgeons      * Lynnette Son - Primary    Resident/Fellow/Other Assistant:  Surgeon(s) and Role:    Procedure Summary  Anesthesia: General  ASA: II  Anesthesia Staff: Anesthesiologist: Shravan Napoles MD; Veto Velarde DO  RIAN: Orin Morse  Estimated Blood Loss: 350mL  Intra-op Medications:   Administrations occurring from 1000 to 1230 on 24:   Medication Name Total Dose   vasopressin (Vasostrict) injection 20 Units              Anesthesia Record               Intraprocedure I/O Totals          Intake    LR infusion 1000.00 mL    Total Intake 1000 mL          Specimen:   ID Type Source Tests Collected by Time   1 : Endometrial Currettings Tissue ENDOMETRIUM CURETTINGS SURGICAL PATHOLOGY EXAM Lynnette Son MD [facility] 2024 1115   2 : urine drawn from bladder with a syringe Non-Gynecologic Cytology URINE CATHETERIZED CYTOLOGY CONSULTATION (NON-GYNECOLOGIC) Lynnette Son MD MPH 2024 1308        Staff:   Circulator: Toribio Huston RN  Relief Circulator: López Landaverde RN  Scrub Person: Lorelei Arias         Drains and/or Catheters:   Urethral Catheter Non-latex 16 Fr. (Active)       Tourniquet Times:         Implants:  Implants       Type Name Action Serial No.      Surgical Mesh Sling Implant SLING, MENDYARA, BLUE TV, WITH 2.7 INTRODUCER -  [facility]-[id number] - [id number] Implanted [id number]              Findings: Hysteroscopy revealed atrophic endometrium.   Normal urethral and bladder epithelium. Orthotopic ureters with bilateral efflux seen. No stones, tumors, lesions or evidence of injury seen.       Indications: Deisi Barkley is an 83 y.o. female who is having surgery for POP-Q stage 4 cystocele [N81.10]  Stress incontinence [N39.3]  Endometrial polyp [N84.0].     The patient was seen in the preoperative area. The risks, benefits, complications, treatment options, non-operative alternatives, expected recovery and outcomes were discussed with the patient. The possibilities of reaction to medication, pulmonary aspiration, injury to surrounding structures, bleeding, recurrent infection, the need for additional procedures, failure to diagnose a condition, and creating a complication requiring transfusion or operation were discussed with the patient. The patient concurred with the proposed plan, giving informed consent.  The site of surgery was properly noted/marked if necessary per policy. The patient has been actively warmed in preoperative area. Preoperative antibiotics have been ordered and given within 1 hours of incision. Venous thrombosis prophylaxis have been ordered including bilateral sequential compression devices and chemical prophylaxis    Procedure Details:     INDICATIONS FOR PROCEDURE: 83 y.o.  with stage 4 vaginal  prolapse. History notable for DINO and small polyp seen on preop ultrasound. She desired surgical management of this.       [place]   A time out was taken verifying patient name and intended procedure.    INDICATIONS FOR PROCEDURE: Symptomatic vaginal vault prolapse.  She was counseled extensively that the vagina would subsequently be too short for vaginal intercourse.  Because of the lower morbidity associated with a colpocleisis and her clear understanding that sexual intercourse would not be possible, we decided to proceed  with a colpocleisis.      PROCEDURE AND FINDINGS: A general anesthetic was administered and the patient was placed in the dorsal lithotomy position in candy cane stirrups and prepped and draped in the normal sterile fashion. A Glover catheter was placed.      A speculum was placed into the vagina, and the anterior lip of the cervix was grasped with a single-tooth tenaculum. The cervix was dilated to 19 Mal[place] using Joshi dilators. The Myosure hysteroscope was then inserted through the cervical canal and the interior of the uterus was examined. The fundus was seen and bilateral ostia were difficult to visualize. A 360 degree sampling was performed with the myosure light.  After the procedure, the single-toothed tenaculum was removed.    Markings at the base of the vagina and laterally for channels were made and vasopressin was injected circumferentially around the everted vagina.  The posterior vaginal epithelium was divided and the rectovaginal muscularis was mobilized.  The anterior vaginal wall was placed on traction and then divided in the midline. The pubocervical fascia was then dissected free from the vaginal epithelium.  The vaginal epithelium was then removed.  Several anterior to posterior sutures of 2-0 Vicryl bringing the pubocervical and rectovaginal muscularis into contact were placed to reduce the prolapse.      In cystoscopy, she had orthotopic ureters with bilateral efflux seen. There was a white thickened plaque near the dome of the bladder. Due to this, we collected a urine specimen and sent for urine cytology. Otherwise normal appearing bladder with no stones or other lesions seen.    Vasopressin was infiltrated on each side of the urethra and retropubically.  A midurethral incision was made and the periurethral space was developed with Metzenbaum scissors.  With the bladder empty and the urethra deflected to the contralateral side, the TVT needle was advanced through the endopelvic fascia, space  of Retzius and abdominal wall to a premarked spot.  The same procedure was carried out on the contralateral side.  Care was taken to avoid bladder injury and cystoscopy with the trocars in place showed the absence of bladder penetration.  After the needles had been drawn through the abdominal wall, the tape was adjusted so that it rested underneath the midportion of the urethra in a tension free manner.  The plastic sleeves were carefully removed while the suburethral portion of the sling was stabilized.  The suprapubic incisions were closed with skin glue and the vaginal wall was closed with a running 3-0 Vicryl stitch.    To close the hiatus, a wedge shaped portion of the posterior wall was removed. the levator ani muscles were reapproximated with interrupted 2-0 PDS sutures. The vaginal epithelium was reapproximated with a 2-0 Vicryl in a running fashion.     At the end of the operation, all areas were hemostatic.  The patient was sent to the recovery area in stable condition.        The Glover catheter was left to drain. The patient tolerated the procedures well. Sponge, instrument and needle counts were correct times two.  The patient was taken to the recovery room in stable condition.     Complications:  None; patient tolerated the procedure well.    Disposition: PACU - hemodynamically stable.  Condition: stable             Attending Attestation: I was present and scrubbed for the entire procedure.    Lynnette Son  Phone Number: [phone]

## 2024-02-26 NOTE — DISCHARGE INSTRUCTIONS
Call Dr. Son for any problems and/or concerns    *Walk as much as possible, it is ok to use stairs carefully  *Continue the coughing and deep breathing exercises that your learned in the hospital  *No lifting/straining greater than 10 pounds for 6 weeks (Avoid strenuous activity)  *Vaginal rest for 6 weeks (Do not put anything in your vagina. Do not use tampons or douches. Do not have sex until cleared by physician)  *Prevent constipation by using stool softeners and staying hydrated, so that you do not strain against your stitches or have pain from constipation    Call Doctor right away for:    *Fever above 100.4/shaking chills  *Bright red vaginal bleeding or bleeding that soaks more than one sanitary pad per hour  *A foul smelling discharge from the vagina  *Trouble urinating or burning with urination  *Severe pain or bloating in your abdomen  *Persistent nausea/vomiting  *Redness, swelling, or drainage at your incision sites  *Chest pain/shortness of breath-call 911.    - You will be going home with prescriptions for pain medication. If you are able to take them, alternate a dose of Acetaminophen (Tylenol) and Ibuprofen (Motrin) every 3 hours. (For example, 1000mg of Tylenol at 09:00am, 600mg ibuprofen at 12:00pm, 1000mg of Tylenol at 3:00pm, 600mg ibuprofen at 6:00pm). Use any prescribed narcotic (ie oxycodone) for breakthrough pain as prescribed. Use a stool softener, such as Miralax, to prevent constipation from the narcotic medication.

## 2024-02-26 NOTE — INTERVAL H&P NOTE
H&P reviewed. The patient was examined and there are no changes to the H&P.    Plan for dilation and curettage with MyoSure, colpocleisis, posterior repair, perineorrhaphy, midurethral sling, cystoscopy.     Carito Chandra MD  Urogynecology and Reconstructive Pelvic Surgery Fellow  2/26/2024 10:05 AM

## 2024-02-27 VITALS
OXYGEN SATURATION: 98 % | BODY MASS INDEX: 25.49 KG/M2 | WEIGHT: 135 LBS | RESPIRATION RATE: 14 BRPM | DIASTOLIC BLOOD PRESSURE: 60 MMHG | SYSTOLIC BLOOD PRESSURE: 121 MMHG | HEIGHT: 61 IN | TEMPERATURE: 97.5 F | HEART RATE: 80 BPM

## 2024-02-27 LAB
ERYTHROCYTE [DISTWIDTH] IN BLOOD BY AUTOMATED COUNT: 13 % (ref 11.5–14.5)
HCT VFR BLD AUTO: 29.3 % (ref 36–46)
HGB BLD-MCNC: 9.7 G/DL (ref 12–16)
MCH RBC QN AUTO: 31.9 PG (ref 26–34)
MCHC RBC AUTO-ENTMCNC: 33.1 G/DL (ref 32–36)
MCV RBC AUTO: 96 FL (ref 80–100)
NRBC BLD-RTO: 0 /100 WBCS (ref 0–0)
PLATELET # BLD AUTO: 130 X10*3/UL (ref 150–450)
RBC # BLD AUTO: 3.04 X10*6/UL (ref 4–5.2)
WBC # BLD AUTO: 9.7 X10*3/UL (ref 4.4–11.3)

## 2024-02-27 PROCEDURE — 2500000004 HC RX 250 GENERAL PHARMACY W/ HCPCS (ALT 636 FOR OP/ED): Performed by: OBSTETRICS & GYNECOLOGY

## 2024-02-27 PROCEDURE — 85027 COMPLETE CBC AUTOMATED: CPT | Performed by: OBSTETRICS & GYNECOLOGY

## 2024-02-27 PROCEDURE — 7100000011 HC EXTENDED STAY RECOVERY HOURLY - NURSING UNIT

## 2024-02-27 PROCEDURE — 36415 COLL VENOUS BLD VENIPUNCTURE: CPT | Performed by: OBSTETRICS & GYNECOLOGY

## 2024-02-27 RX ADMIN — ACETAMINOPHEN 975 MG: 325 TABLET ORAL at 00:26

## 2024-02-27 RX ADMIN — HEPARIN SODIUM 5000 UNITS: 5000 INJECTION, SOLUTION INTRAVENOUS; SUBCUTANEOUS at 00:26

## 2024-02-27 ASSESSMENT — COGNITIVE AND FUNCTIONAL STATUS - GENERAL
MOVING TO AND FROM BED TO CHAIR: A LITTLE
DRESSING REGULAR UPPER BODY CLOTHING: A LITTLE
STANDING UP FROM CHAIR USING ARMS: A LITTLE
DRESSING REGULAR LOWER BODY CLOTHING: A LITTLE
WALKING IN HOSPITAL ROOM: A LITTLE
TOILETING: A LITTLE
MOBILITY SCORE: 19
DAILY ACTIVITIY SCORE: 20
TURNING FROM BACK TO SIDE WHILE IN FLAT BAD: A LITTLE
HELP NEEDED FOR BATHING: A LITTLE
CLIMB 3 TO 5 STEPS WITH RAILING: A LITTLE

## 2024-02-27 ASSESSMENT — PAIN SCALES - GENERAL: PAINLEVEL_OUTOF10: 0 - NO PAIN

## 2024-02-27 NOTE — DISCHARGE SUMMARY
Discharge Diagnosis  Encounter for postoperative care    Issues Requiring Follow-Up  Follow up with Dr. Son in 2 weeks for your post op visit.     Test Results Pending At Discharge  Pending Labs       Order Current Status    Cytology Consultation (Non-Gynecologic) In process    Surgical Pathology Exam In process            Hospital Course   Ms. Barkley was admitted after her D&C, colpocleisis, posterior repair and mid urethral sling on 2/26/24. On POD #1 she passed her void trial, was tolerating PO intake, and ambulating with a walker which is her baseline. She feels she is ready for discharge home today.   Reviewed 2 week follow up in clinic and medications as well as post op expectations at the bedside.     Pertinent Physical Exam At Time of Discharge  Physical Exam  HENT:      Head: Normocephalic.      Mouth/Throat:      Mouth: Mucous membranes are moist.   Eyes:      Pupils: Pupils are equal, round, and reactive to light.   Cardiovascular:      Rate and Rhythm: Normal rate and regular rhythm.      Heart sounds: Normal heart sounds.   Pulmonary:      Effort: Pulmonary effort is normal. No respiratory distress.      Breath sounds: Normal breath sounds. No stridor. No wheezing, rhonchi or rales.   Chest:      Chest wall: No tenderness.   Abdominal:      General: Abdomen is flat. Bowel sounds are normal.   Genitourinary:     General: Normal vulva.   Musculoskeletal:      Cervical back: Normal range of motion.   Skin:     General: Skin is warm.   Neurological:      General: No focal deficit present.      Mental Status: She is alert. Mental status is at baseline.   Psychiatric:         Mood and Affect: Mood normal.         Home Medications     Medication List      START taking these medications     acetaminophen 500 mg tablet; Commonly known as: Tylenol; Take 2 tablets   (1,000 mg) by mouth every 6 hours if needed for mild pain (1 - 3).   ibuprofen 600 mg tablet; Take 1 tablet (600 mg) by mouth every 6 hours   if  needed for mild pain (1 - 3).   oxyCODONE 5 mg immediate release tablet; Commonly known as: Roxicodone;   Take 1 tablet (5 mg) by mouth every 6 hours if needed for severe pain (7 -   10) for up to 3 days.   polyethylene glycol 17 gram/dose powder; Commonly known as: Glycolax,   Miralax; Mix 17 grams in liuqid and drink by mouth once daily.     CONTINUE taking these medications     apixaban 5 mg tablet; Commonly known as: Eliquis   calcium citrate-vitamin D3 315 mg-5 mcg (200 unit) tablet; Commonly   known as: Citracal+D   GARLIQUE ORAL   glucosamine-chondroitin 500-400 mg tablet   multivitamin with minerals iron-free; Commonly known as: Centrum Silver       Outpatient Follow-Up  Future Appointments   Date Time Provider Department Center   6/19/2024  1:00 PM Marj Mcgraw MD, MS [id number]       Lynnette Son MD MPH

## 2024-02-27 NOTE — CARE PLAN
Problem: Pain  Goal: Takes deep breaths with improved pain control throughout the shift  Outcome: Progressing  Goal: Turns in bed with improved pain control throughout the shift  Outcome: Progressing  Goal: Walks with improved pain control throughout the shift  Outcome: Progressing  Goal: Performs ADL's with improved pain control throughout shift  Outcome: Progressing  Goal: Participates in PT with improved pain control throughout the shift  Outcome: Progressing  Goal: Free from opioid side effects throughout the shift  Outcome: Progressing  Goal: Free from acute confusion related to pain meds throughout the shift  Outcome: Progressing   The patient's goals for the shift include      The clinical goals for the shift include rest

## 2024-02-27 NOTE — PROGRESS NOTES
Spiritual Care Visit    Clinical Encounter Type  Visited With: Patient  Routine Visit: Introduction  Continue Visiting: Yes         Values/Beliefs  Spiritual Requests During Hospitalization: Anointing & Communion today    Sacramental Encounters  Communion: Patient wants communion  Communion Given Indicator: Yes  Sacrament of Sick-Anointing: Anointed     Lee Breen

## 2024-02-27 NOTE — CARE PLAN
The patient's goals for the shift include  rest    The clinical goals for the shift include rest    Problem: Pain  Goal: Takes deep breaths with improved pain control throughout the shift  Outcome: Progressing  Goal: Turns in bed with improved pain control throughout the shift  Outcome: Progressing  Goal: Walks with improved pain control throughout the shift  Outcome: Progressing  Goal: Performs ADL's with improved pain control throughout shift  Outcome: Progressing  Goal: Participates in PT with improved pain control throughout the shift  Outcome: Progressing  Goal: Free from opioid side effects throughout the shift  Outcome: Progressing  Goal: Free from acute confusion related to pain meds throughout the shift  Outcome: Progressing

## 2024-02-27 NOTE — CARE PLAN
The patient's goals for the shift include  to get rest    The clinical goals for the shift include to get rest    Over the shift, the patient did not make progress toward the following goals. Barriers to progression include none. Recommendations to address these barriers include none.

## 2024-02-28 ENCOUNTER — TELEPHONE (OUTPATIENT)
Dept: OBSTETRICS AND GYNECOLOGY | Facility: CLINIC | Age: 84
End: 2024-02-28
Payer: MEDICARE

## 2024-02-28 ENCOUNTER — PHARMACY VISIT (OUTPATIENT)
Dept: PHARMACY | Facility: CLINIC | Age: 84
End: 2024-02-28
Payer: MEDICARE

## 2024-02-28 LAB
LABORATORY COMMENT REPORT: NORMAL
LABORATORY COMMENT REPORT: NORMAL
PATH REPORT.FINAL DX SPEC: NORMAL
PATH REPORT.GROSS SPEC: NORMAL
PATH REPORT.RELEVANT HX SPEC: NORMAL
PATH REPORT.TOTAL CANCER: NORMAL

## 2024-02-28 PROCEDURE — RXMED WILLOW AMBULATORY MEDICATION CHARGE

## 2024-02-28 NOTE — TELEPHONE ENCOUNTER
"Call to Deisi Barkley regarding recovery from surgery with Dr Son on 2/26/2024. She states that her recovery is going \"wonderfully\". She has absolutely no pain ( only some twinges when setting for too long). Her last pain medication was Tylenol, last Tuesday morning. She is eating and drinking OK. She is passing gas and has had a bowel movement. I gave her my phone number if any problems do arise.   "

## 2024-02-29 LAB
LABORATORY COMMENT REPORT: NORMAL
PATH REPORT.FINAL DX SPEC: NORMAL
PATH REPORT.GROSS SPEC: NORMAL
PATH REPORT.RELEVANT HX SPEC: NORMAL
PATH REPORT.TOTAL CANCER: NORMAL

## 2024-03-14 ENCOUNTER — TELEPHONE (OUTPATIENT)
Dept: CARDIOLOGY | Facility: HOSPITAL | Age: 84
End: 2024-03-14
Payer: MEDICARE

## 2024-03-19 ENCOUNTER — OFFICE VISIT (OUTPATIENT)
Dept: OBSTETRICS AND GYNECOLOGY | Facility: CLINIC | Age: 84
End: 2024-03-19
Payer: MEDICARE

## 2024-03-19 VITALS — BODY MASS INDEX: 24.75 KG/M2 | SYSTOLIC BLOOD PRESSURE: 118 MMHG | DIASTOLIC BLOOD PRESSURE: 76 MMHG | WEIGHT: 131 LBS

## 2024-03-19 DIAGNOSIS — N81.4 UTEROVAGINAL PROLAPSE: Primary | ICD-10-CM

## 2024-03-19 PROCEDURE — 1160F RVW MEDS BY RX/DR IN RCRD: CPT | Performed by: OBSTETRICS & GYNECOLOGY

## 2024-03-19 PROCEDURE — 1036F TOBACCO NON-USER: CPT | Performed by: OBSTETRICS & GYNECOLOGY

## 2024-03-19 PROCEDURE — 1159F MED LIST DOCD IN RCRD: CPT | Performed by: OBSTETRICS & GYNECOLOGY

## 2024-03-19 PROCEDURE — 99024 POSTOP FOLLOW-UP VISIT: CPT | Performed by: OBSTETRICS & GYNECOLOGY

## 2024-03-19 NOTE — PROGRESS NOTES
[facility] Department of Urogynecology   Lynnette Son MD, MPH   [phone]    ASSESSMENT AND PLAN:   83 y.o. female presenting in postop s/p dilation and curettage, colpocleisis, posterior repair, mid-urethral sling and cystoscopy for complete uterine procidentia, endometrial polyp, and DINO. Femur fx and hip surgery in June 2023 with limited ROM/mobility issues - trouble putting feet in the stirrups for pelvic exams. Comorbidities include: Fibromuscular dysplasia.     Diagnoses:       #1 Complete uterine procidentia, resolved   #2 DINO, resolved   #3 Endometrial polyp, resolved     Plan:   1. Complete uterine procidentia, DINO, endometrial polyp (postop)  - She is s/p dilation and curettage, colpocleisis, posterior repair, mid-urethral sling and cystoscopy on 2/26/24.   - Patient did not take any Oxycodone pills after surgery. Advised patient that some pharmacies will take the pills back in a narcotic take back bin.   - We reviewed surgery was about 1.5-2 hours. Discussed her surgical pathology demonstrated an endometrial polyp.   - Patient is healing well with no return of DINO or POP.     2. UUI  - If she is having UUI in 1 month, we will discuss treatment options at the next visit.      Follow-up in 1 month with Dr. Son.     Scribe Attestation:   ILili, am scribing for virtually, and in the presence of Lynnette Son MD MPH on 3/19/24 at 3:15 PM.     Problem List Items Addressed This Visit    None  Visit Diagnoses       Uterovaginal prolapse    -  Primary        I Dr. Son, personally performed the services described in the documentation as scribed in my presence and confirm it is both complete and accurate.  Lynnette Son MD, MPH, FACOG      Established    HISTORY OF PRESENT ILLNESS:     Deisi Barkley is a 83 y.o. female who presents s/p dilation and curettage, colpocleisis, posterior repair, mid-urethral sling and cystoscopy on 2/26/24.     Record Review:   - 2/26/24 Pathology -  A. ENDOMETRIUM CURETTINGS:   -- Morphologic features suggestive of endometrial polyp.    -- Fragments of smooth muscle; benign myometrium versus leiomyoma cannot be excluded.  - Hysteroscopy and D&C with myosure, Lefort colpocleisis, posterior repair, mid-urethral sling and cystoscopy on 2/26/24. Stayed overnight.   - 9/26/2023 visit: Planning colpocleisis for uterine procidentia. Underwent pelvic U/S which showed a 2mm endometrial thickness and 4mm possible polyp - spoke with radiologist and he does not think this is potential for cancer but does think this could cause some bleeding. Planning to do D&C at the time of POP/DINO repair. +DINO confirmed on UDS.     Postop Symptoms:  - Last took medication for pain around 3:30 AM the morning after the surgery. She took Tylenol. Since she has not had pain.   - She was given 10 pills of Oxycodone, but did not need them.   - Patient had a small amount of discharge this morning.   - She wears a brief, pad, and toilet paper on top.   - Patient is having some urinary urgency.   - Denies fevers or chills.   - Not taking anything for bowel regimen. She is having BMs daily.   - Denies feeling vaginal bulge.   - She was wiped too hard by the nurses.   - Denies leaking with cough, laugh, or sneeze.   - Feels she empties her bladder. She waits on the toilet for a few minutes after voiding.   - If she waits longer than 1.5-2 hours to void, she has a strong urge and leaks when standing.     Past Medical History:     Past Medical History:   Diagnosis Date    Adjustment disorder     DVT (deep venous thrombosis) (CMS/[facility])     right leg 11/8/2024    Fall     Fibromuscular dysplasia of carotid artery (CMS/[facility])     Heart attack (CMS/[facility]) 2014    Hip fracture (CMS/[facility])     Insomnia     OAB (overactive bladder)     Osteoporosis     PMB (postmenopausal bleeding)     Skin cancer     on face    Urinary tract infection     Uterine prolapse     Weakness           Past Surgical History:     Past  Surgical History:   Procedure Laterality Date    ADENOIDECTOMY      CARDIAC CATHETERIZATION Left     with no stent placed    CT ANGIO NECK  04/17/2023    CT NECK ANGIO W AND WO IV CONTRAST GEN CT    CT HEAD ANGIO W AND WO IV CONTRAST  04/17/2023    CT HEAD ANGIO W AND WO IV CONTRAST GEN CT    MR HEAD ANGIO W AND WO IV CONTRAST  04/17/2023    MR HEAD ANGIO W AND WO IV CONTRAST GEN MRI    MR NECK ANGIO W AND WO IV CONTRAST  04/17/2023    MR NECK ANGIO W AND WO IV CONTRAST GEN MRI    OTHER SURGICAL HISTORY Right     femur fracture repair    TONSILLECTOMY      TUBAL LIGATION           Medications:     Prior to Admission medications    Medication Sig Start Date End Date Taking? Authorizing Provider   acetaminophen (Tylenol) 500 mg tablet Take 2 tablets (1,000 mg) by mouth every 6 hours if needed for mild pain (1 - 3). 2/26/24   Hope H MD Prateek   apixaban (Eliquis) 5 mg tablet Take 1 tablet (5 mg) by mouth 2 times a day.    Historical Provider, MD   calcium citrate-vitamin D3 (Citracal+D) 315 mg-5 mcg (200 unit) tablet Take 1 tablet by mouth 4 times a day.    Historical Provider, MD   garlic (GARLIQUE ORAL) Take 1 tablet by mouth once daily.    Historical Provider, MD   glucosamine-chondroitin 500-400 mg tablet Take 1 tablet by mouth once daily. MOVE FREE    Historical Provider, MD   ibuprofen 600 mg tablet Take 1 tablet (600 mg) by mouth every 6 hours if needed for mild pain (1 - 3). 2/26/24   Hope H MD Prateek   multivitamin with minerals iron-free (Centrum Silver) Take 1 tablet by mouth once daily.    Historical Provider, MD   polyethylene glycol (Glycolax, Miralax) 17 gram/dose powder Mix 17 grams in liuqid and drink by mouth once daily. 2/26/24   Hope H MD Prateek        ROS  Review of Systems       PHYSICAL EXAM:    /76   Wt 59.4 kg (131 lb)   BMI 24.75 kg/m²   No LMP recorded. Patient is postmenopausal.    Declines chaperone for physical exam.      Well developed, well nourished, in no apparent distress.    Neurologic/Psychiatric:  Awake, Alert and Oriented times 3.  Affect normal.     GENITAL/URINARY:     External Genitalia:  The patient has normal appearing external genitalia, normal skenes and bartholins glands, and a normal hair distribution.  Her vulva is without lesions, erythema or discharge.  It is non-tender with appropriate sensation. Sling exit points are well healed. Perineal body is well healed    Urethral Meatus:  Size normal, Location normal, Lesions absent, Prolapse absent,      Urethra:  Fullness absent, Masses absent,      Bladder:  Fullness absent, Masses absent, Tenderness absent,      Vagina:  General appearance normal, Discharge absent, Lesions absent, no mesh erosion seen or palpated, no pain over sling         Anus/Perineum:  Lesions absent and Masses absent defer exam  Normal Perineum    Physical Exam  Genitourinary:   POP-Q measurements were:      Aa: -3, Ba: -3, C: -3     gH: 1, pB: TVL: 3     Ap: -3, Bp: -3, D: x        Data and DIAGNOSTIC STUDIES REVIEWED   ECG 12 lead (Clinic Performed)    Result Date: 2/25/2024   Poor data quality, interpretation may be adversely affected Sinus rhythm with occasional Premature ventricular complexes Right bundle branch block Abnormal ECG Confirmed by Jazz Jones ([id number]) on 2/25/2024 11:31:20 PM     Lab Results   Component Value Date    URINECULTURE (A) 07/18/2023     MULTIPLE ORGANISMS PRESENT, PROBABLE CONTAMINATION~PLEASE REPEAT CULTURE.      Lab Results   Component Value Date    GLUCOSE 90 12/13/2023    CALCIUM 9.6 12/13/2023     12/13/2023    K 4.0 12/13/2023    CO2 25 12/13/2023     12/13/2023    BUN 15 12/13/2023    CREATININE 0.66 12/13/2023     Lab Results   Component Value Date    WBC 9.7 02/27/2024    HGB 9.7 (L) 02/27/2024    HCT 29.3 (L) 02/27/2024    MCV 96 02/27/2024     (L) 02/27/2024

## 2024-04-16 ENCOUNTER — APPOINTMENT (OUTPATIENT)
Dept: OBSTETRICS AND GYNECOLOGY | Facility: CLINIC | Age: 84
End: 2024-04-16
Payer: MEDICARE

## 2024-04-16 ENCOUNTER — OFFICE VISIT (OUTPATIENT)
Dept: OBSTETRICS AND GYNECOLOGY | Facility: CLINIC | Age: 84
End: 2024-04-16
Payer: MEDICARE

## 2024-04-16 VITALS
HEIGHT: 61 IN | DIASTOLIC BLOOD PRESSURE: 90 MMHG | WEIGHT: 132 LBS | SYSTOLIC BLOOD PRESSURE: 138 MMHG | BODY MASS INDEX: 24.92 KG/M2

## 2024-04-16 DIAGNOSIS — N32.81 OAB (OVERACTIVE BLADDER): Primary | ICD-10-CM

## 2024-04-16 PROCEDURE — 99024 POSTOP FOLLOW-UP VISIT: CPT | Performed by: OBSTETRICS & GYNECOLOGY

## 2024-04-16 PROCEDURE — 1159F MED LIST DOCD IN RCRD: CPT | Performed by: OBSTETRICS & GYNECOLOGY

## 2024-04-16 PROCEDURE — 1160F RVW MEDS BY RX/DR IN RCRD: CPT | Performed by: OBSTETRICS & GYNECOLOGY

## 2024-04-16 RX ORDER — MIRABEGRON 25 MG/1
25 TABLET, FILM COATED, EXTENDED RELEASE ORAL DAILY
Qty: 30 TABLET | Refills: 2 | Status: SHIPPED | OUTPATIENT
Start: 2024-04-16 | End: 2024-04-25 | Stop reason: SDUPTHER

## 2024-04-16 NOTE — PROGRESS NOTES
POST OPERATIVE VISIT  The patient is a 83 y.o. female who presents for a postoperative follow up visit.    She underwent hysteroscopy and D&C with myosure, Lefort colpocleisis, posterior repair, mid-urethral sling and cystoscopy on 2/26/2024 and is now 8 week(s)  postop.    Records Review:  - 3/19/2024 postop visit note RE: Healing well on exam with resolved POP and DINO symptoms. Endorsing UUI at this visit.   - 2/26/2024 Pathology:  A. ENDOMETRIUM CURETTINGS:   -- Morphologic features suggestive of endometrial polyp.    -- Fragments of smooth muscle; benign myometrium versus leiomyoma cannot be excluded.  - 2/26/2024 surgery: Hysteroscopy and D&C with myosure, Lefort colpocleisis, posterior repair, mid-urethral sling and cystoscopy  - 9/26/2023 visit: Planning colpocleisis for uterine procidentia. Underwent pelvic U/S which showed a 2mm endometrial thickness and 4mm possible polyp - spoke with radiologist and he does not think this is potential for cancer but does think this could cause some bleeding. Planning to do D&C at the time of POP/DINO repair. +DINO confirmed on UDS.     INTERVAL HISTORY:  Postoperative Symptoms:  - She reports experiencing persistent urinary urgency with UUI episodes and does not listen to the first urge as she is distracted watching TV sometimes. She has tried timed voiding every 2-3 hours and continues to have UUI leakage.   - Drinks 1 cup of coffee in the mornings and denies drinking soda or artificial sweetened drinks.   - Denies return of POP or DINO symptoms since her surgery.   - She feels that she fully empties her bladder with urinating and states she sits 8-10 minutes on the toilet to ensure that her bladder is empties completely.   - Denies experiencing any vaginal bleeding, abnormal malodorous discharge, fever, chills, nausea, or vomiting.   - She is overall very satisfied with the outcome of her POP and DINO repair surgery.     Use of estrogen cream: No    Prolapse symptoms:  "No    Urinary Incontinence symptoms:       Stress incontinence: No       Urgency: Yes       Frequency: No       Urge incontinence: Yes    Voiding dysfunction symptoms: No    Defecatory symptoms: No    Fecal Incontinence: No    Pain: None, denies     PHYSICAL EXAM:  /90   Ht 1.549 m (5' 1\")   Wt 59.9 kg (132 lb)   BMI 24.94 kg/m²     General Appearance: well developed, good nutrition, well groomed, no deformities, normal habitus  Head: normocephalic, and atraumatic  Neck: symmetric, midline trachea,  full range of motion  Respiratory: no dyspnea, negative for intercostal retraction or uses of accessory muscles of respiration  Cardiovascular: peripheral pulses are normal, no swelling, edema or varicosities      Impression/plan:  83 year old female presenting in postoperative follow up s/p dilation and curettage, colpocleisis, posterior repair, mid-urethral sling and cystoscopy for complete uterine procidentia, endometrial polyp, and DINO 2/26/2024. She is now experiencing persistent UUI leakage. Comorbidities include: Venous insufficiency and hypothyroidism.     Diagnoses:  #1 Stress urinary incontinence (resolved)  #2 Uterine procidentia (resolved)  #3 Urge incontinence     Plan:  1. DINO and uterine procidentia (resolved), postop  - 2/26/2024 surgery: Dilation and curettage, colpocleisis, posterior repair, mid-urethral sling and cystoscopy.  - She is doing well postoperatively and is healing normally.   - Preoperative POP and DINO symptoms resolved.     2. UUI  - We discussed OAB lifestyle changes (i.e., trying to limit fluids to 60oz in total per day, timed voiding every 2-3 hours, stop drinking fluids 3 hours prior to bedtime, and avoiding/limiting bladder irritants such as caffeine, nicotine, artificial sweeteners, acidic/spicy foods, and alcohol).  - Discussed OAB treatment options such as lifestyle changes, PFPT, medications, PTNS, intradetrusor Botox injections, or SNM.   - We discussed that with " starting an OAB medication the goal would be to allow the detrusor muscle around the bladder to relax and prevent the muscles from spasm/squeezing too frequently to allow the bladder to store more urine which reduces the urge, frequency, and incontinent episodes.   - She is amenable to trying an OAB medication and lifestyle changes.   - Sent Rx of Myrbetriq ER 25mg to her preferred pharmacy with instructions to take 1 pill by mouth daily. Discussed the drug profile and possible medication side effects including slightly elevated blood pressure. Encouraged her to routinely monitor her blood pressure at home when she starts to take this medication. If her blood pressure is elevated after starting Myrbetriq we advised her to discontinue taking the Myrbetriq and to call our office. This medication may take up to 2-4 weeks to reach full effect.    Return in 4 week(s) for a virtual visit with Dr. Lynnette Son for OAB medication check.     Scribe Attestation  By signing my name below, I, Froy Valentine, attest that this documentation has been prepared under the direction and in the presence of Lynnette Son MD MPH on 04/16/2024 at 6:40 PM.     I Dr. Son, personally performed the services described in the documentation as scribed in my presence and confirm it is both complete and accurate.  Lynnette Son MD, MPH, FACOG

## 2024-04-18 ENCOUNTER — TELEPHONE (OUTPATIENT)
Dept: OBSTETRICS AND GYNECOLOGY | Facility: CLINIC | Age: 84
End: 2024-04-18
Payer: MEDICARE

## 2024-04-19 NOTE — TELEPHONE ENCOUNTER
No answer when calling Deisi Barkley to ask for more information to address issue for which she called.

## 2024-04-25 ENCOUNTER — TELEPHONE (OUTPATIENT)
Dept: OBSTETRICS AND GYNECOLOGY | Facility: CLINIC | Age: 84
End: 2024-04-25
Payer: MEDICARE

## 2024-04-25 DIAGNOSIS — N32.81 OAB (OVERACTIVE BLADDER): ICD-10-CM

## 2024-04-25 RX ORDER — MIRABEGRON 25 MG/1
25 TABLET, FILM COATED, EXTENDED RELEASE ORAL DAILY
Qty: 30 TABLET | Refills: 2 | Status: SHIPPED | OUTPATIENT
Start: 2024-04-25

## 2024-05-16 ENCOUNTER — APPOINTMENT (OUTPATIENT)
Dept: OBSTETRICS AND GYNECOLOGY | Facility: CLINIC | Age: 84
End: 2024-05-16
Payer: MEDICARE

## 2024-05-24 ENCOUNTER — APPOINTMENT (OUTPATIENT)
Dept: OBSTETRICS AND GYNECOLOGY | Facility: CLINIC | Age: 84
End: 2024-05-24
Payer: MEDICARE

## 2024-06-19 ENCOUNTER — APPOINTMENT (OUTPATIENT)
Dept: CARDIOLOGY | Facility: HOSPITAL | Age: 84
End: 2024-06-19
Payer: MEDICARE

## 2024-10-09 ENCOUNTER — HOSPITAL ENCOUNTER (OUTPATIENT)
Dept: RADIOLOGY | Facility: HOSPITAL | Age: 84
Discharge: HOME | End: 2024-10-09
Payer: MEDICARE

## 2024-10-09 ENCOUNTER — APPOINTMENT (OUTPATIENT)
Dept: CARDIOLOGY | Facility: HOSPITAL | Age: 84
End: 2024-10-09
Payer: MEDICARE

## 2024-10-09 VITALS — OXYGEN SATURATION: 98 % | SYSTOLIC BLOOD PRESSURE: 133 MMHG | HEART RATE: 83 BPM | DIASTOLIC BLOOD PRESSURE: 92 MMHG

## 2024-10-09 DIAGNOSIS — Z86.718 PERSONAL HISTORY OF DVT (DEEP VEIN THROMBOSIS): Primary | ICD-10-CM

## 2024-10-09 DIAGNOSIS — M25.561 ACUTE PAIN OF RIGHT KNEE: ICD-10-CM

## 2024-10-09 PROCEDURE — 1036F TOBACCO NON-USER: CPT | Performed by: INTERNAL MEDICINE

## 2024-10-09 PROCEDURE — 73562 X-RAY EXAM OF KNEE 3: CPT | Mod: RT

## 2024-10-09 PROCEDURE — 99214 OFFICE O/P EST MOD 30 MIN: CPT | Performed by: INTERNAL MEDICINE

## 2024-10-09 PROCEDURE — 1160F RVW MEDS BY RX/DR IN RCRD: CPT | Performed by: INTERNAL MEDICINE

## 2024-10-09 PROCEDURE — 1159F MED LIST DOCD IN RCRD: CPT | Performed by: INTERNAL MEDICINE

## 2024-10-09 PROCEDURE — 73562 X-RAY EXAM OF KNEE 3: CPT | Mod: RIGHT SIDE | Performed by: RADIOLOGY

## 2024-10-09 RX ORDER — ASPIRIN 81 MG/1
81 TABLET ORAL DAILY
COMMUNITY

## 2024-10-09 NOTE — PROGRESS NOTES
No chief complaint on file.      History of Present Illness:  Deisi Barkley is a/an 84 y.o. woman who follows up for provoked right leg DVT after hip fracture surgery. DVT dx 2024. She completed 3 months of anticoagulation.    She reports no significant right leg swelling. Trying to walk every other day for 20-30 minutes. Doing well after bladder surgery and resection of a skin cancer on her right face.    She reports that her right knee has started to point inward. It's minimally painful but it clicks and feels unstable at times.    No recent falls.    Past Medical History:   Diagnosis Date    Adjustment disorder     DVT (deep venous thrombosis) (Multi)     right leg 2024    Fall     Fibromuscular dysplasia of carotid artery (CMS-HCC)     Heart attack (Multi) 2014    Hip fracture (Multi)     Insomnia     OAB (overactive bladder)     Osteoporosis     PMB (postmenopausal bleeding)     Skin cancer     on face    Urinary tract infection     Uterine prolapse     Weakness      Past Surgical History:   Procedure Laterality Date    ADENOIDECTOMY      CARDIAC CATHETERIZATION Left     with no stent placed    CT ANGIO NECK  2023    CT NECK ANGIO W AND WO IV CONTRAST GEN CT    CT HEAD ANGIO W AND WO IV CONTRAST  2023    CT HEAD ANGIO W AND WO IV CONTRAST GEN CT    MR HEAD ANGIO W AND WO IV CONTRAST  2023    MR HEAD ANGIO W AND WO IV CONTRAST GEN MRI    MR NECK ANGIO W AND WO IV CONTRAST  2023    MR NECK ANGIO W AND WO IV CONTRAST GEN MRI    OTHER SURGICAL HISTORY Right     femur fracture repair    TONSILLECTOMY      TUBAL LIGATION       Social History     Tobacco Use    Smoking status: Former     Current packs/day: 0.00     Types: Cigarettes     Quit date: 1964     Years since quittin.8    Smokeless tobacco: Never   Vaping Use    Vaping status: Never Used   Substance Use Topics    Alcohol use: Never    Drug use: Never     Family History   Problem Relation Name Age of Onset    Rheum  arthritis Sister      Other (trigeminal neuralgia) Son      Anxiety disorder Son      Rheum arthritis Mother's Sister      Hypertension Other       Current Outpatient Medications   Medication Sig Dispense Refill    acetaminophen (Tylenol) 500 mg tablet Take 2 tablets (1,000 mg) by mouth every 6 hours if needed for mild pain (1 - 3). 30 tablet 0    calcium citrate-vitamin D3 (Citracal+D) 315 mg-5 mcg (200 unit) tablet Take 1 tablet by mouth 4 times a day.      garlic (GARLIQUE ORAL) Take 1 tablet by mouth once daily.      glucosamine-chondroitin 500-400 mg tablet Take 1 tablet by mouth once daily. MOVE FREE      ibuprofen 600 mg tablet Take 1 tablet (600 mg) by mouth every 6 hours if needed for mild pain (1 - 3). 30 tablet 0    mirabegron (Myrbetriq) 25 mg tablet extended release 24 hr 24 hr tablet Take 1 tablet (25 mg) by mouth once daily. 30 tablet 2    multivitamin with minerals iron-free (Centrum Silver) Take 1 tablet by mouth once daily.      polyethylene glycol (Glycolax, Miralax) 17 gram/dose powder [facility] and drink by mouth once daily. 527 g 0     No current facility-administered medications for this visit.       Physical Examination:  Blood pressure (!) 133/92, pulse 83, SpO2 98%.  No distress  No JVD or carotid bruits  Lungs clear bilaterally  Heart regular and without murmurs  Abdomen soft and non-tender  No leg swelling  Pulses intact  Right knee valgus deformity    Pertinent Labs:    Pertinent Imaging:    Diagnoses and all orders for this visit:  Personal history of DVT (deep vein thrombosis) (Primary)  Acute pain of right knee  -     XR knee right 4+ views; Future    Continue baby aspirin for VTE prophylaxis      Marj Mcgraw MD, MS

## 2024-10-09 NOTE — PATIENT INSTRUCTIONS
I think it would be a great idea to have Dr. Del Valle or one of his partners look at your knee. Call his office at [phone].    I want you to continue the baby aspirin for blood clot prevention.    Should you have questions, please do not hesitate to call my office at [phone], or you can reach me on Spayee if you have signed up for it. If you have urgent concerns, call the office, do not use Spayee.

## 2024-10-11 ENCOUNTER — TELEPHONE (OUTPATIENT)
Dept: CARDIOLOGY | Facility: HOSPITAL | Age: 84
End: 2024-10-11
Payer: MEDICARE

## 2024-10-29 ENCOUNTER — OFFICE VISIT (OUTPATIENT)
Dept: ORTHOPEDIC SURGERY | Facility: CLINIC | Age: 84
End: 2024-10-29
Payer: MEDICARE

## 2024-10-29 DIAGNOSIS — M25.561 CHRONIC PAIN OF RIGHT KNEE: ICD-10-CM

## 2024-10-29 DIAGNOSIS — G89.29 CHRONIC PAIN OF RIGHT KNEE: ICD-10-CM

## 2024-10-29 DIAGNOSIS — M17.11 PRIMARY OSTEOARTHRITIS OF RIGHT KNEE: Primary | ICD-10-CM

## 2024-10-29 PROCEDURE — 1159F MED LIST DOCD IN RCRD: CPT | Performed by: ORTHOPAEDIC SURGERY

## 2024-10-29 PROCEDURE — 2500000004 HC RX 250 GENERAL PHARMACY W/ HCPCS (ALT 636 FOR OP/ED): Performed by: ORTHOPAEDIC SURGERY

## 2024-10-29 PROCEDURE — 20610 DRAIN/INJ JOINT/BURSA W/O US: CPT | Mod: RT | Performed by: ORTHOPAEDIC SURGERY

## 2024-10-29 PROCEDURE — 99204 OFFICE O/P NEW MOD 45 MIN: CPT | Performed by: ORTHOPAEDIC SURGERY

## 2024-10-29 PROCEDURE — L1812 KO ELASTIC W/JOINTS PRE OTS: HCPCS | Performed by: ORTHOPAEDIC SURGERY

## 2024-10-29 PROCEDURE — 99214 OFFICE O/P EST MOD 30 MIN: CPT | Performed by: ORTHOPAEDIC SURGERY

## 2024-10-29 NOTE — PROGRESS NOTES
Chief Complaint   Chief Complaint   Patient presents with    Right Knee - Pain         HPI:      Deisi Barkley is a pleasant 84 y.o. year-old female who is seen today for pain and angulation of her right knee she has had injections in the knee before tries to wear knee wrap    Review of Systems all other body systems have been reviewed and are negative for complaint.    There were no vitals filed for this visit.    Past Medical History:   Diagnosis Date    Adjustment disorder     DVT (deep venous thrombosis) (Multi)     right leg 11/8/2024    Fall     Fibromuscular dysplasia of carotid artery (CMS-[facility])     Heart attack 2014    Hip fracture (Multi)     Insomnia     OAB (overactive bladder)     Osteoporosis     PMB (postmenopausal bleeding)     Skin cancer     on face    Urinary tract infection     Uterine prolapse     Weakness      Patient Active Problem List   Diagnosis    Primary osteoarthritis involving multiple joints    Subclinical hypothyroidism    Venous insufficiency    Fibromuscular dysplasia of carotid artery (CMS-[facility])    Osteoporosis    Closed displaced comminuted fracture of shaft of right femur with routine healing    UTI (urinary tract infection)    Physical deconditioning    Anemia    Asthenia    At risk for falls    Sleep disorder    S/P right hip fracture    Vaginal prolapse    Anxiety    Adjustment disorder    Other insomnia    POP-Q stage 4 cystocele    Stress incontinence    Endometrial polyp    Encounter for postoperative care       Medication Documentation Review Audit       Reviewed by Jose J Hogue MA (Medical Assistant) on 10/29/24 at 1502      Medication Order Taking? Sig Documenting Provider Last Dose Status   acetaminophen (Tylenol) 500 mg tablet [id number] No Take 2 tablets (1,000 mg) by mouth every 6 hours if needed for mild pain (1 - 3). Carito Chandra MD Taking Active   aspirin 81 mg EC tablet [id number] No Take 1 tablet (81 mg) by mouth once daily. Historical Provider, MD Taking Active    calcium citrate-vitamin D3 (Citracal+D) 315 mg-5 mcg (200 unit) tablet [id number] No Take 1 tablet by mouth 4 times a day. Historical Provider, MD Taking Active   garlic (GARLIQUE ORAL) [id number] No Take 1 tablet by mouth once daily. Historical Provider, MD Taking Active   glucosamine-chondroitin 500-400 mg tablet [id number] No Take 1 tablet by mouth once daily. MOVE FREE Historical Provider, MD Taking Active   ibuprofen 600 mg tablet [id number] No Take 1 tablet (600 mg) by mouth every 6 hours if needed for mild pain (1 - 3). Carito Chandra MD Taking Active   mirabegron (Myrbetriq) 25 mg tablet extended release 24 hr 24 hr tablet [id number] No Take 1 tablet (25 mg) by mouth once daily. Lynnette Son MD [facility] Taking Active   multivitamin with minerals iron-free (Centrum Silver) [id number] No Take 1 tablet by mouth once daily. Historical Provider, MD Taking Active   polyethylene glycol (Glycolax, Miralax) 17 gram/dose powder [id number] No Mix 17 grams in liuqid and drink by mouth once daily. Carito Chandra MD Taking Active                    No Known Allergies    Social History     Socioeconomic History    Marital status:      Spouse name: Not on file    Number of children: Not on file    Years of education: Not on file    Highest education level: Not on file   Occupational History    Not on file   Tobacco Use    Smoking status: Former     Current packs/day: 0.00     Types: Cigarettes     Quit date:      Years since quittin.8    Smokeless tobacco: Never   Vaping Use    Vaping status: Never Used   Substance and Sexual Activity    Alcohol use: Never    Drug use: Never    Sexual activity: Not on file   Other Topics Concern    Not on file   Social History Narrative    Not on file     Social Drivers of Health     Financial Resource Strain: Patient Declined (2024)    Overall Financial Resource Strain (CARDIA)     Difficulty of Paying Living Expenses: Patient declined   Food Insecurity: Not on file  "  Transportation Needs: Patient Declined (2/26/2024)    PRAPARE - Transportation     Lack of Transportation (Medical): Patient declined     Lack of Transportation (Non-Medical): Patient declined   Physical Activity: Not on file   Stress: Not on file   Social Connections: Not on file   Intimate Partner Violence: Not on file   Housing Stability: Patient Declined (2/26/2024)    Housing Stability Vital Sign     Unable to Pay for Housing in the Last Year: Patient declined     Number of Places Lived in the Last Year: 1     Unstable Housing in the Last Year: Patient declined       Past Surgical History:   Procedure Laterality Date    ADENOIDECTOMY      CARDIAC CATHETERIZATION Left     with no stent placed    CT ANGIO NECK  04/17/2023    CT NECK ANGIO W AND WO IV CONTRAST GEN CT    CT HEAD ANGIO W AND WO IV CONTRAST  04/17/2023    CT HEAD ANGIO W AND WO IV CONTRAST GEN CT    MR HEAD ANGIO W AND WO IV CONTRAST  04/17/2023    MR HEAD ANGIO W AND WO IV CONTRAST GEN MRI    MR NECK ANGIO W AND WO IV CONTRAST  04/17/2023    MR NECK ANGIO W AND WO IV CONTRAST GEN MRI    OTHER SURGICAL HISTORY Right     femur fracture repair    TONSILLECTOMY      TUBAL LIGATION         There is no height or weight on file to calculate BMI.    HgA1c:   No results found for: \"HGBA1C\", \"GOSFYCHD1R\"    Physical Exam:  Constitutional: Well-developed well-nourished   Eyes: Sclerae anicteric, pupils equal and round  HENT: Normocephalic atraumatic  Cardiovascular: Pulses full, regular rate and rhythm  Respiratory: Breathing not labored, no wheezing  Integumentary: Skin intact, no lesions or rashes  Neurological: Sensation intact, no gross strength deficits, reflexes equal  Psychiatric: Alert oriented and appropriate  Hematologic/lymphatic: No lymphadenopathy  Right knee: Valgus deformity slight flexion contracture full flexion and laxity with valgus stressing small effusion          Imaging:  Knee x-rays personally reviewed show valgus deformity advanced " arthritis        Impression/Plan:  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

## 2024-11-07 PROCEDURE — 2500000004 HC RX 250 GENERAL PHARMACY W/ HCPCS (ALT 636 FOR OP/ED): Performed by: ORTHOPAEDIC SURGERY

## 2024-11-07 PROCEDURE — 20610 DRAIN/INJ JOINT/BURSA W/O US: CPT | Mod: RT | Performed by: ORTHOPAEDIC SURGERY

## 2024-11-07 RX ORDER — LIDOCAINE HYDROCHLORIDE 20 MG/ML
2 INJECTION, SOLUTION INFILTRATION; PERINEURAL
Status: COMPLETED | OUTPATIENT
Start: 2024-11-07 | End: 2024-11-07

## 2024-11-07 RX ORDER — METHYLPREDNISOLONE ACETATE 40 MG/ML
40 INJECTION, SUSPENSION INTRA-ARTICULAR; INTRALESIONAL; INTRAMUSCULAR; SOFT TISSUE
Status: COMPLETED | OUTPATIENT
Start: 2024-11-07 | End: 2024-11-07

## 2025-02-27 ENCOUNTER — APPOINTMENT (OUTPATIENT)
Dept: OBSTETRICS AND GYNECOLOGY | Facility: CLINIC | Age: 85
End: 2025-02-27
Payer: MEDICARE

## 2025-02-28 ENCOUNTER — APPOINTMENT (OUTPATIENT)
Dept: OBSTETRICS AND GYNECOLOGY | Facility: CLINIC | Age: 85
End: 2025-02-28
Payer: MEDICARE

## 2025-02-28 ENCOUNTER — TELEMEDICINE (OUTPATIENT)
Dept: OBSTETRICS AND GYNECOLOGY | Facility: CLINIC | Age: 85
End: 2025-02-28
Payer: MEDICARE

## 2025-02-28 DIAGNOSIS — Z87.42 HISTORY OF COLPOCLEISIS: ICD-10-CM

## 2025-02-28 DIAGNOSIS — Z98.890 HISTORY OF COLPOCLEISIS: ICD-10-CM

## 2025-02-28 DIAGNOSIS — N32.81 OAB (OVERACTIVE BLADDER): Primary | ICD-10-CM

## 2025-02-28 ASSESSMENT — ENCOUNTER SYMPTOMS
ENDOCRINE NEGATIVE: 1
CONSTITUTIONAL NEGATIVE: 1
NEUROLOGICAL NEGATIVE: 1
EYES NEGATIVE: 1
CARDIOVASCULAR NEGATIVE: 1
RESPIRATORY NEGATIVE: 1
GASTROINTESTINAL NEGATIVE: 1
PSYCHIATRIC NEGATIVE: 1
MUSCULOSKELETAL NEGATIVE: 1

## 2025-02-28 NOTE — PROGRESS NOTES
[facility] Department of Urogynecology   Lynnette Son MD, MPH   [phone]    ASSESSMENT AND PLAN:   84-year-old female being assessed for OAB and UUI. She had D&C, colpocleisis, [place], MUS, and cystoscopy for complete uterine procidentia, endometrial polyp, and DINO 2/26/2024. Comorbidities include: venous insufficiency and hypothyroidism.    Diagnoses:  #1 Urge incontinence  #2 Uterine procidentia (resolved)  #3 Stress incontinence (resolved)    Plan:  UUI  - Having more frequent accidents but defers medication at this time.  - She prefers non-pharmacological management. She was advised to reduce caffeine intake and consider decaffeinated or half-caf coffee.  - Discussed alternative tx options such as Botox injections and bladder stimulators, but patient prefers to continue current management with pads.    2. Uterine procidentia (resolved), DINO (resolved)  - 2/26/2024 surgery: Dilation and curettage, colpocleisis, posterior repair, mid-urethral sling and cystoscopy.  - Reports sensation of bulging in the pubic area, but no significant prolapse sxs.  - Advised to monitor sxs and RTC for eval if sensation worsens/otherwise becomes bothersome.    Follow-up with Dr. Son PRN.    Scribe Attestation  By signing my name below, IHaim Scribe, attest that this documentation has been prepared under the direction and in the presence of Lynnette Son MD MPH on 02/28/2025 at 10:25 AM.    Problem List Items Addressed This Visit    None     Virtual or Telephone Consent    While technically available, the patient was unable or unwilling to consent to connect via audio/video telehealth technology; therefore, I performed this visit using a real-time audio only connection between Deisi Barkley & Lynnette Son MD MPH.  Verbal consent was requested and obtained from Deisi Barkley on this date, 02/28/25 for a telehealth visit and the patient's location was confirmed at the time of the visit.    I spent a  total of 15 minutes in face to face and non face to face time.     I Dr. Son, personally performed the services described in the documentation as scribed in my presence and confirm it is both complete and accurate.  Lynnette Son MD, MPH, FACOG       Lynnette Son MD, MPH, FACOG     Established    HISTORY OF PRESENT ILLNESS:   84-year-old female presenting to discuss UUI.     Record Review:   - 6/21/2024 Dr. Son note RE: Doing fine. DINO had resolved. Having some urgency without great improvement on Myrbetriq + dizziness as a side effect. Discontinued and didn't want another medication.  - 4/16/2024 post-op visit note RE: Doing well post post-operatively, but she is having some problems with urgency. Discussed OAB lifestyle changes and treatment options. She is amenable to trying an OAB medication and lifestyle changes. Sent Rx of Myrbetriq ER 25 mg to her preferred pharmacy.  - 3/19/2024 postop visit note RE: Healing well on exam with resolved POP and DINO symptoms. Endorsing UUI at this visit.   - 2/26/2024 Pathology:  A. ENDOMETRIUM CURETTINGS:   -- Morphologic features suggestive of endometrial polyp.    -- Fragments of smooth muscle; benign myometrium versus leiomyoma cannot be excluded.  - 2/26/2024 surgery: Hysteroscopy and D&C with myosure, Lefort colpocleisis, posterior repair, mid-urethral sling and cystoscopy  - 9/26/2023 visit: Planning colpocleisis for uterine procidentia. Underwent pelvic U/S which showed a 2mm endometrial thickness and 4mm possible polyp - spoke with radiologist and he does not think this is potential for cancer but does think this could cause some bleeding. Planning to do D&C at the time of POP/DINO repair. +DINO confirmed on UDS.     Prolapse Symptoms:  - She feels like there's a bulge toward the pubic area in the front, which she thinks is a little odd and feels like it's moving around.  - It's not coming out of the vagina; up above the pubic bone.     Urinary Symptoms:   -  She is leaking more often than she was previously, and she isn't having any problems other than this.  - She does drink coffee in the AM.  - She is drinking full caf because she uses less coffee when she brews it.  - She has occasional hot tea.  - She is having accidents more than once daily.  - This is annoying to her, but she would rather not pursue medication at this time and would rather use pads.           Past Medical History:     Past Medical History:   Diagnosis Date    Adjustment disorder     DVT (deep venous thrombosis) (Multi)     right leg 11/8/2024    Fall     Fibromuscular dysplasia of carotid artery (CMS-[facility])     Heart attack 2014    Hip fracture (Multi)     Insomnia     OAB (overactive bladder)     Osteoporosis     PMB (postmenopausal bleeding)     Skin cancer     on face    Urinary tract infection     Uterine prolapse     Weakness           Past Surgical History:     Past Surgical History:   Procedure Laterality Date    ADENOIDECTOMY      CARDIAC CATHETERIZATION Left     with no stent placed    CT ANGIO NECK  04/17/2023    CT NECK ANGIO W AND WO IV CONTRAST GEN CT    CT HEAD ANGIO W AND WO IV CONTRAST  04/17/2023    CT HEAD ANGIO W AND WO IV CONTRAST GEN CT    MR HEAD ANGIO W AND WO IV CONTRAST  04/17/2023    MR HEAD ANGIO W AND WO IV CONTRAST GEN MRI    MR NECK ANGIO W AND WO IV CONTRAST  04/17/2023    MR NECK ANGIO W AND WO IV CONTRAST GEN MRI    OTHER SURGICAL HISTORY Right     femur fracture repair    TONSILLECTOMY      TUBAL LIGATION           Medications:     Prior to Admission medications    Medication Sig Start Date End Date Taking? Authorizing Provider   acetaminophen (Tylenol) 500 mg tablet Take 2 tablets (1,000 mg) by mouth every 6 hours if needed for mild pain (1 - 3). 2/26/24   Carito Chandra MD   aspirin 81 mg EC tablet Take 1 tablet (81 mg) by mouth once daily.    Historical Provider, MD   calcium citrate-vitamin D3 (Citracal+D) 315 mg-5 mcg (200 unit) tablet Take 1 tablet by mouth 4  times a day.    Historical Provider, MD   garlic (GARLIQUE ORAL) Take 1 tablet by mouth once daily.    Historical Provider, MD   glucosamine-chondroitin 500-400 mg tablet Take 1 tablet by mouth once daily. MOVE FREE    Historical Provider, MD   ibuprofen 600 mg tablet Take 1 tablet (600 mg) by mouth every 6 hours if needed for mild pain (1 - 3). 2/26/24   Hope H MD Prateek   mirabegron (Myrbetriq) 25 mg tablet extended release 24 hr 24 hr tablet Take 1 tablet (25 mg) by mouth once daily. 4/25/24   Lynnette Son MD MPH   multivitamin with minerals iron-free (Centrum Silver) Take 1 tablet by mouth once daily.    Historical Provider, MD   polyethylene glycol (Glycolax, Miralax) 17 gram/dose powder Mix 17 grams in liuqid and drink by mouth once daily. 2/26/24   Hope H MD Prateek        ROS  Review of Systems   Constitutional: Negative.    HENT: Negative.     Eyes: Negative.    Respiratory: Negative.     Cardiovascular: Negative.    Gastrointestinal: Negative.    Endocrine: Negative.    Genitourinary:  Positive for enuresis and urgency.   Musculoskeletal: Negative.    Neurological: Negative.    Psychiatric/Behavioral: Negative.            PHYSICAL EXAM:    There were no vitals taken for this visit.  No LMP recorded. Patient is postmenopausal.    Declines chaperone for physical exam.      Well developed, well nourished, in no apparent distress.   Neurologic/Psychiatric:  Awake, Alert and Oriented times 3.  Affect normal.           Data and DIAGNOSTIC STUDIES REVIEWED   No results found.   Lab Results   Component Value Date    URINECULTURE (A) 07/18/2023     MULTIPLE ORGANISMS PRESENT, PROBABLE CONTAMINATION~PLEASE REPEAT CULTURE.      Lab Results   Component Value Date    GLUCOSE 90 12/13/2023    CALCIUM 9.6 12/13/2023     12/13/2023    K 4.0 12/13/2023    CO2 25 12/13/2023     12/13/2023    BUN 15 12/13/2023    CREATININE 0.66 12/13/2023     Lab Results   Component Value Date    WBC 9.7 02/27/2024    HGB 9.7  (L) 02/27/2024    HCT 29.3 (L) 02/27/2024    MCV 96 02/27/2024     (L) 02/27/2024

## 2025-07-01 ENCOUNTER — APPOINTMENT (OUTPATIENT)
Dept: CARDIOLOGY | Facility: HOSPITAL | Age: 85
End: 2025-07-01
Payer: MEDICARE

## 2025-07-01 ENCOUNTER — APPOINTMENT (OUTPATIENT)
Dept: RADIOLOGY | Facility: HOSPITAL | Age: 85
End: 2025-07-01
Payer: MEDICARE

## 2025-07-01 ENCOUNTER — HOSPITAL ENCOUNTER (EMERGENCY)
Facility: HOSPITAL | Age: 85
Discharge: HOME | End: 2025-07-01
Payer: MEDICARE

## 2025-07-01 VITALS
SYSTOLIC BLOOD PRESSURE: 153 MMHG | WEIGHT: 137 LBS | BODY MASS INDEX: 25.86 KG/M2 | RESPIRATION RATE: 17 BRPM | HEART RATE: 70 BPM | HEIGHT: 61 IN | TEMPERATURE: 96.9 F | OXYGEN SATURATION: 99 % | DIASTOLIC BLOOD PRESSURE: 89 MMHG

## 2025-07-01 DIAGNOSIS — H81.10 BENIGN PAROXYSMAL POSITIONAL VERTIGO, UNSPECIFIED LATERALITY: Primary | ICD-10-CM

## 2025-07-01 LAB
ALBUMIN SERPL BCP-MCNC: 4.7 G/DL (ref 3.4–5)
ALP SERPL-CCNC: 66 U/L (ref 33–136)
ALT SERPL W P-5'-P-CCNC: 16 U/L (ref 7–45)
ANION GAP SERPL CALC-SCNC: 16 MMOL/L (ref 10–20)
APPEARANCE UR: CLEAR
AST SERPL W P-5'-P-CCNC: 26 U/L (ref 9–39)
BASOPHILS # BLD AUTO: 0.01 X10*3/UL (ref 0–0.1)
BASOPHILS NFR BLD AUTO: 0.2 %
BILIRUB SERPL-MCNC: 0.7 MG/DL (ref 0–1.2)
BILIRUB UR STRIP.AUTO-MCNC: NEGATIVE MG/DL
BUN SERPL-MCNC: 18 MG/DL (ref 6–23)
CALCIUM SERPL-MCNC: 10.7 MG/DL (ref 8.6–10.3)
CARDIAC TROPONIN I PNL SERPL HS: 3 NG/L (ref 0–13)
CARDIAC TROPONIN I PNL SERPL HS: 3 NG/L (ref 0–13)
CHLORIDE SERPL-SCNC: 104 MMOL/L (ref 98–107)
CO2 SERPL-SCNC: 22 MMOL/L (ref 21–32)
COLOR UR: ABNORMAL
CREAT SERPL-MCNC: 0.78 MG/DL (ref 0.5–1.05)
EGFRCR SERPLBLD CKD-EPI 2021: 74 ML/MIN/1.73M*2
EOSINOPHIL # BLD AUTO: 0.02 X10*3/UL (ref 0–0.4)
EOSINOPHIL NFR BLD AUTO: 0.3 %
ERYTHROCYTE [DISTWIDTH] IN BLOOD BY AUTOMATED COUNT: 13.2 % (ref 11.5–14.5)
GLUCOSE SERPL-MCNC: 89 MG/DL (ref 74–99)
GLUCOSE UR STRIP.AUTO-MCNC: NORMAL MG/DL
HCT VFR BLD AUTO: 44.2 % (ref 36–46)
HGB BLD-MCNC: 14.9 G/DL (ref 12–16)
IMM GRANULOCYTES # BLD AUTO: 0.04 X10*3/UL (ref 0–0.5)
IMM GRANULOCYTES NFR BLD AUTO: 0.7 % (ref 0–0.9)
KETONES UR STRIP.AUTO-MCNC: ABNORMAL MG/DL
LEUKOCYTE ESTERASE UR QL STRIP.AUTO: ABNORMAL
LYMPHOCYTES # BLD AUTO: 3.4 X10*3/UL (ref 0.8–3)
LYMPHOCYTES NFR BLD AUTO: 56.1 %
MCH RBC QN AUTO: 32.2 PG (ref 26–34)
MCHC RBC AUTO-ENTMCNC: 33.7 G/DL (ref 32–36)
MCV RBC AUTO: 96 FL (ref 80–100)
MONOCYTES # BLD AUTO: 0.53 X10*3/UL (ref 0.05–0.8)
MONOCYTES NFR BLD AUTO: 8.7 %
MUCOUS THREADS #/AREA URNS AUTO: ABNORMAL /LPF
NEUTROPHILS # BLD AUTO: 2.06 X10*3/UL (ref 1.6–5.5)
NEUTROPHILS NFR BLD AUTO: 34 %
NITRITE UR QL STRIP.AUTO: NEGATIVE
NRBC BLD-RTO: 0 /100 WBCS (ref 0–0)
PH UR STRIP.AUTO: 6.5 [PH]
PLATELET # BLD AUTO: 165 X10*3/UL (ref 150–450)
POTASSIUM SERPL-SCNC: 3.5 MMOL/L (ref 3.5–5.3)
PROT SERPL-MCNC: 7.8 G/DL (ref 6.4–8.2)
PROT UR STRIP.AUTO-MCNC: NEGATIVE MG/DL
RBC # BLD AUTO: 4.63 X10*6/UL (ref 4–5.2)
RBC # UR STRIP.AUTO: NEGATIVE MG/DL
RBC #/AREA URNS AUTO: ABNORMAL /HPF
SODIUM SERPL-SCNC: 138 MMOL/L (ref 136–145)
SP GR UR STRIP.AUTO: 1.01
SQUAMOUS #/AREA URNS AUTO: ABNORMAL /HPF
UROBILINOGEN UR STRIP.AUTO-MCNC: NORMAL MG/DL
WBC # BLD AUTO: 6.1 X10*3/UL (ref 4.4–11.3)
WBC #/AREA URNS AUTO: ABNORMAL /HPF

## 2025-07-01 PROCEDURE — 70450 CT HEAD/BRAIN W/O DYE: CPT | Performed by: RADIOLOGY

## 2025-07-01 PROCEDURE — 36415 COLL VENOUS BLD VENIPUNCTURE: CPT | Performed by: PHYSICIAN ASSISTANT

## 2025-07-01 PROCEDURE — 87086 URINE CULTURE/COLONY COUNT: CPT | Mod: GENLAB | Performed by: PHYSICIAN ASSISTANT

## 2025-07-01 PROCEDURE — 99285 EMERGENCY DEPT VISIT HI MDM: CPT | Mod: 25

## 2025-07-01 PROCEDURE — 80053 COMPREHEN METABOLIC PANEL: CPT | Performed by: PHYSICIAN ASSISTANT

## 2025-07-01 PROCEDURE — 81001 URINALYSIS AUTO W/SCOPE: CPT | Performed by: PHYSICIAN ASSISTANT

## 2025-07-01 PROCEDURE — 70450 CT HEAD/BRAIN W/O DYE: CPT

## 2025-07-01 PROCEDURE — 93005 ELECTROCARDIOGRAM TRACING: CPT

## 2025-07-01 PROCEDURE — 84484 ASSAY OF TROPONIN QUANT: CPT | Performed by: PHYSICIAN ASSISTANT

## 2025-07-01 PROCEDURE — 2500000002 HC RX 250 W HCPCS SELF ADMINISTERED DRUGS (ALT 637 FOR MEDICARE OP, ALT 636 FOR OP/ED): Performed by: PHYSICIAN ASSISTANT

## 2025-07-01 PROCEDURE — 85025 COMPLETE CBC W/AUTO DIFF WBC: CPT | Performed by: PHYSICIAN ASSISTANT

## 2025-07-01 RX ORDER — MECLIZINE HYDROCHLORIDE 25 MG/1
25 TABLET ORAL 4 TIMES DAILY
Qty: 28 TABLET | Refills: 0 | Status: SHIPPED | OUTPATIENT
Start: 2025-07-01 | End: 2025-07-08

## 2025-07-01 RX ORDER — MECLIZINE HCL 12.5 MG 12.5 MG/1
25 TABLET ORAL ONCE
Status: COMPLETED | OUTPATIENT
Start: 2025-07-01 | End: 2025-07-01

## 2025-07-01 RX ADMIN — MECLIZINE HYDROCHLORIDE 25 MG: 12.5 TABLET ORAL at 16:14

## 2025-07-01 ASSESSMENT — PAIN SCALES - GENERAL
PAINLEVEL_OUTOF10: 0 - NO PAIN

## 2025-07-01 ASSESSMENT — PAIN - FUNCTIONAL ASSESSMENT: PAIN_FUNCTIONAL_ASSESSMENT: 0-10

## 2025-07-01 NOTE — ED TRIAGE NOTES
Pt here via EMS from home for dizziness. Pt put her eye ointment in around 11am and when she sat up she got dizzy and nauseated. She drank a glass of water and it didn't help. She says its slowly getting worse. Pt says she stopped taking aspirin a couple weeks ago for an upcoming eye surgery.

## 2025-07-02 LAB
ATRIAL RATE: 78 BPM
BACTERIA UR CULT: NORMAL
HOLD SPECIMEN: NORMAL
P AXIS: 68 DEGREES
P OFFSET: 150 MS
P ONSET: 101 MS
PR INTERVAL: 248 MS
Q ONSET: 225 MS
QRS COUNT: 13 BEATS
QRS DURATION: 138 MS
QT INTERVAL: 420 MS
QTC CALCULATION(BAZETT): 478 MS
QTC FREDERICIA: 458 MS
R AXIS: 68 DEGREES
T AXIS: 0 DEGREES
T OFFSET: 435 MS
VENTRICULAR RATE: 78 BPM

## 2025-07-03 NOTE — ED PROVIDER NOTES
HPI   Chief Complaint   Patient presents with    Dizziness     Pt here via EMS from home for dizziness. Pt put her eye ointment in around 11am and when she sat up she got dizzy and nauseated. She drank a glass of water and it didn't help. She says its slowly getting worse. Pt says she stopped taking aspirin a couple weeks ago for an upcoming eye surgery.        History of present illness:  84-year-old female presents to the emergency room for complaints of dizziness.  The patient states that around 11 AM this morning she got up and she was having some water and she suddenly got very dizzy and nauseated.  She states she has never had anything like this Before and states that she just felt like she was very off balance and felt the room was spinning.  She states that she did not fall or injure herself and she was eventually able to call for EMS.  She is unsure of her medical history and states that she is waiting to have surgery done on her left eye for extra troponin.  She has past medical history of a DVT adjustment disorder coronary disease hip fracture and osteoporosis.    Social history: Negative for alcohol and drug use.    Review of systems:   Gen.: No weight loss, fatigue, anorexia, insomnia, fever.   Eyes: No vision loss, double vision  ENT: No pharyngitis, dry mouth.   Cardiac: No chest pain, palpitations, syncope, near syncope.   Pulmonary: No shortness of breath, cough, hemoptysis.   Heme/lymph: No swollen glands, fever, bleeding.   GI: No abdominal pain, change in bowel habits, melena, hematemesis, hematochezia, nausea, vomiting, diarrhea.   : No discharge, dysuria, frequency, urgency, hematuria.   Musculoskeletal: No limb pain, joint pain, joint swelling.   Skin: No rashes.   Review of systems is otherwise negative unless stated above or in history of present illness.      Physical exam:  General: Vitals noted, no distress. Afebrile.   EENT: TMs clear. Posterior oropharynx unremarkable.   Cardiac:  Regular, rate, rhythm, no murmur.   Pulmonary: Lungs clear bilaterally with good aeration. No adventitious breath sounds.   Abdomen: Soft, nonsurgical. Nontender. No peritoneal signs. Normoactive bowel sounds.   Extremities: No peripheral edema.   Skin: No rash.   Neuro: No focal neurologic deficits, NIH score of 0.  Test of skew was negative ambulation was off slightly and the patient needed assistance of the practitioner get back into her bed as she was leaning to the left side.            Medical decision making:   Testing: Urinalysis negative troponin x 2 was negative CBC CMP unremarkable CT scan of the head did not show any acute findings interpreted by radiology    Plan: Home-going.  Discussed differential. Will follow-up with the primary physician in the next 2-3 days. Return if worse. They understand return precautions and discharge instructions. Patient and family/friend/caregiver are in agreement with this plan. 84-year-old female presents to the emergency room for complaints of dizziness.  The patient states that around 11 AM this morning she got up and she was having some water and she suddenly got very dizzy and nauseated.  She states she has never had anything like this Before and states that she just felt like she was very off balance and felt the room was spinning.  She states that she did not fall or injure herself and she was eventually able to call for EMS.  She is unsure of her medical history and states that she is waiting to have surgery done on her left eye for extra troponin.  She has past medical history of a DVT adjustment disorder coronary disease hip fracture and osteoporosis. Neuro: No focal neurologic deficits, NIH score of 0.  Test of skew was negative ambulation was off slightly and the patient needed assistance of the practitioner get back into her bed as she was leaning to the left side.  After initial physical exam the patient was given Antivert and orthostatics were also performed  which were negative.  After the patient received the Antivert she explained to me that her symptoms completely resolved and she feels much better at this time.  She states initially when she would move her head to the left side she would sometimes develop the vertigo but states that is completely resolved now.  We attempted another ambulation test and she was able to ambulate with no assistance at this time and states that she feels much better.  She is requesting to go home at this time.  I explained to her that I felt comfortable sending her home at this time and explained to her that I believe that she is suffering from benign positional vertigo.  I strongly encouraged her return in event that she has any worsening symptoms at this time.  Impression:   1.  Benign positional vertigo            History provided by:  Patient   used: No            Patient History   Medical History[1]  Surgical History[2]  Family History[3]  Social History[4]    Physical Exam   ED Triage Vitals   Temperature Heart Rate Respirations BP   07/01/25 1412 07/01/25 1412 07/01/25 1412 07/01/25 1412   36.2 °C (97.1 °F) 80 18 (!) 154/92      Pulse Ox Temp Source Heart Rate Source Patient Position   07/01/25 1412 07/01/25 1412 07/01/25 1412 07/01/25 1643   100 % Temporal Monitor Lying      BP Location FiO2 (%)     07/01/25 2130 --     Left arm        Physical Exam      ED Course & MDM   Diagnoses as of 07/02/25 2137   Benign paroxysmal positional vertigo, unspecified laterality                 No data recorded     Alvaro Coma Scale Score: 15 (07/01/25 1413 : Mojgan Bryan RN)                           Medical Decision Making      Procedure  Procedures       [1]   Past Medical History:  Diagnosis Date    Adjustment disorder     DVT (deep venous thrombosis) (Multi)     right leg 11/8/2024    Fall     Fibromuscular dysplasia of carotid artery     Heart attack 2014    Hip fracture (Multi)     Insomnia     OAB (overactive  bladder)     Osteoporosis     PMB (postmenopausal bleeding)     Skin cancer     on face    Urinary tract infection     Uterine prolapse     Weakness    [2]   Past Surgical History:  Procedure Laterality Date    ADENOIDECTOMY      CARDIAC CATHETERIZATION Left     with no stent placed    CT ANGIO NECK  2023    CT NECK ANGIO W AND WO IV CONTRAST GEN CT    CT HEAD ANGIO W AND WO IV CONTRAST  2023    CT HEAD ANGIO W AND WO IV CONTRAST GEN CT    MR HEAD ANGIO W AND WO IV CONTRAST  2023    MR HEAD ANGIO W AND WO IV CONTRAST GEN MRI    MR NECK ANGIO W AND WO IV CONTRAST  2023    MR NECK ANGIO W AND WO IV CONTRAST GEN MRI    OTHER SURGICAL HISTORY Right     femur fracture repair    TONSILLECTOMY      TUBAL LIGATION     [3]   Family History  Problem Relation Name Age of Onset    Rheum arthritis Sister      Other (trigeminal neuralgia) Son      Anxiety disorder Son      Rheum arthritis Mother's Sister      Hypertension Other     [4]   Social History  Tobacco Use    Smoking status: Former     Current packs/day: 0.00     Types: Cigarettes     Quit date:      Years since quittin.5    Smokeless tobacco: Never   Vaping Use    Vaping status: Never Used   Substance Use Topics    Alcohol use: Never    Drug use: Never        Baldo Jackson PA-C  [id number]

## 2025-07-15 PROCEDURE — 88342 IMHCHEM/IMCYTCHM 1ST ANTB: CPT | Performed by: PATHOLOGY

## 2025-07-15 PROCEDURE — 88341 IMHCHEM/IMCYTCHM EA ADD ANTB: CPT

## 2025-07-15 PROCEDURE — 0753T DGTZ GLS MCRSCP SLD LEVEL IV: CPT

## 2025-07-15 PROCEDURE — 88305 TISSUE EXAM BY PATHOLOGIST: CPT | Performed by: PATHOLOGY

## 2025-07-15 PROCEDURE — 88341 IMHCHEM/IMCYTCHM EA ADD ANTB: CPT | Performed by: PATHOLOGY

## 2025-07-15 PROCEDURE — 0761T DGTZ GLS MCRSCP SL IMM EA 1: CPT

## 2025-07-15 PROCEDURE — 88305 TISSUE EXAM BY PATHOLOGIST: CPT

## 2025-07-15 PROCEDURE — 0760T DGTZ GLS MCRSCP SL IMM 1ST: CPT

## 2025-07-15 PROCEDURE — 88342 IMHCHEM/IMCYTCHM 1ST ANTB: CPT

## 2025-07-17 ENCOUNTER — LAB REQUISITION (OUTPATIENT)
Dept: LAB | Facility: HOSPITAL | Age: 85
End: 2025-07-17
Payer: MEDICARE

## 2025-07-17 DIAGNOSIS — H02.115 CICATRICIAL ECTROPION OF LEFT LOWER EYELID: ICD-10-CM

## 2025-07-19 LAB
ATRIAL RATE: 78 BPM
P AXIS: 68 DEGREES
P OFFSET: 150 MS
P ONSET: 101 MS
PR INTERVAL: 248 MS
Q ONSET: 225 MS
QRS COUNT: 13 BEATS
QRS DURATION: 138 MS
QT INTERVAL: 420 MS
QTC CALCULATION(BAZETT): 478 MS
QTC FREDERICIA: 458 MS
R AXIS: 68 DEGREES
T AXIS: 0 DEGREES
T OFFSET: 435 MS
VENTRICULAR RATE: 78 BPM

## 2025-07-23 ENCOUNTER — TELEPHONE (OUTPATIENT)
Dept: CARDIOLOGY | Facility: HOSPITAL | Age: 85
End: 2025-07-23

## 2025-07-28 LAB
LAB AP ASR DISCLAIMER: NORMAL
LABORATORY COMMENT REPORT: NORMAL
PATH REPORT.FINAL DX SPEC: NORMAL
PATH REPORT.GROSS SPEC: NORMAL
PATH REPORT.RELEVANT HX SPEC: NORMAL
PATH REPORT.TOTAL CANCER: NORMAL

## (undated) DEVICE — PREP TRAY, VAGINAL

## (undated) DEVICE — SYRINGE, 10 ML, 21 G X 1 0.5 IN, LUER LOK

## (undated) DEVICE — TISSUE ADHESIVE, EXOFIN, 1ML

## (undated) DEVICE — Device

## (undated) DEVICE — DEVICE, TISSUE REMOVAL, MYOSURE LITE (3PK)

## (undated) DEVICE — PAD, SANITARY, MAXI, U BY KOTEX, REG, LF

## (undated) DEVICE — CATHETER TRAY, URETHRAL, FOLEY, 16 FR, SILICONE

## (undated) DEVICE — GLOVE, SURGICAL, PROTEXIS PI BLUE W/NEUTHERA, 7.0, PF, LF

## (undated) DEVICE — SUTURE, VICRYL, 2-0, 18 IN, CT-2, VIOLET

## (undated) DEVICE — GLOVE, SURGICAL, PROTEXIS PI , 6.5, PF, LF

## (undated) DEVICE — SUTURE, VICRYL, 3-0, 27 IN, SH